# Patient Record
Sex: FEMALE | Race: WHITE | NOT HISPANIC OR LATINO | Employment: OTHER | URBAN - METROPOLITAN AREA
[De-identification: names, ages, dates, MRNs, and addresses within clinical notes are randomized per-mention and may not be internally consistent; named-entity substitution may affect disease eponyms.]

---

## 2017-01-19 ENCOUNTER — TELEPHONE (OUTPATIENT)
Dept: INTERNAL MEDICINE | Facility: CLINIC | Age: 52
End: 2017-01-19

## 2017-01-19 DIAGNOSIS — Z00.00 ROUTINE GENERAL MEDICAL EXAMINATION AT A HEALTH CARE FACILITY: Primary | ICD-10-CM

## 2017-01-19 NOTE — TELEPHONE ENCOUNTER
Hi, All she needs is the cholesterol and hep C check, fasting orders in.  Thank you, Manoj Stein

## 2017-01-19 NOTE — TELEPHONE ENCOUNTER
----- Message from Rupert Powers MA sent at 1/19/2017  8:29 AM CST -----  Contact: LAB APPT   An appointment for an annual physical has been scheduled for 4/4/17  The patient is requesting prior labs.    A lab appointment is scheduled for 3/27/17.  Please link labs to the scheduled appointment.    If the lab appointment is not appropriate, please cancel appointment and notify the patient.    Thank you!

## 2017-03-28 ENCOUNTER — LAB VISIT (OUTPATIENT)
Dept: LAB | Facility: HOSPITAL | Age: 52
End: 2017-03-28
Attending: INTERNAL MEDICINE
Payer: COMMERCIAL

## 2017-03-28 DIAGNOSIS — Z00.00 ROUTINE GENERAL MEDICAL EXAMINATION AT A HEALTH CARE FACILITY: ICD-10-CM

## 2017-03-28 LAB
CHOLEST/HDLC SERPL: 3.7 {RATIO}
HCV AB SERPL QL IA: NEGATIVE
HDL/CHOLESTEROL RATIO: 27.2 %
HDLC SERPL-MCNC: 239 MG/DL
HDLC SERPL-MCNC: 65 MG/DL
LDLC SERPL CALC-MCNC: 131.2 MG/DL
NONHDLC SERPL-MCNC: 174 MG/DL
TRIGL SERPL-MCNC: 214 MG/DL

## 2017-03-28 PROCEDURE — 36415 COLL VENOUS BLD VENIPUNCTURE: CPT

## 2017-03-28 PROCEDURE — 80061 LIPID PANEL: CPT

## 2017-03-28 PROCEDURE — 86803 HEPATITIS C AB TEST: CPT

## 2017-04-04 ENCOUNTER — OFFICE VISIT (OUTPATIENT)
Dept: INTERNAL MEDICINE | Facility: CLINIC | Age: 52
End: 2017-04-04
Payer: COMMERCIAL

## 2017-04-04 VITALS
SYSTOLIC BLOOD PRESSURE: 112 MMHG | HEART RATE: 73 BPM | DIASTOLIC BLOOD PRESSURE: 70 MMHG | BODY MASS INDEX: 23.82 KG/M2 | HEIGHT: 64 IN | WEIGHT: 139.56 LBS | OXYGEN SATURATION: 99 %

## 2017-04-04 DIAGNOSIS — Z00.00 ROUTINE GENERAL MEDICAL EXAMINATION AT A HEALTH CARE FACILITY: Primary | ICD-10-CM

## 2017-04-04 PROCEDURE — 99999 PR PBB SHADOW E&M-EST. PATIENT-LVL III: CPT | Mod: PBBFAC,,, | Performed by: INTERNAL MEDICINE

## 2017-04-04 PROCEDURE — 99396 PREV VISIT EST AGE 40-64: CPT | Mod: S$GLB,,, | Performed by: INTERNAL MEDICINE

## 2017-04-04 NOTE — MR AVS SNAPSHOT
"    [facility]  [address]  [place] [address]  Phone: [phone]  Fax: [phone]                  Rhoda Daly   2017 8:00 AM   Office Visit    Description:  Female : 1965   Provider:  Manoj Stein MD   Department:  [facility]           Reason for Visit     Hip Pain                To Do List           Goals (5 Years of Data)     None      Follow-Up and Disposition     Return in about 2 years (around 2019).      [facility]     [facility] On Call Nurse Care Line -  Assistance  Unless otherwise directed by your provider, please contact [facility], our nurse care line that is available for  assistance.     Registered nurses in the [facility] Center provide: appointment scheduling, clinical advisement, health education, and other advisory services.  Call: [phone] (toll free)               Medications           Message regarding Medications     Verify the changes and/or additions to your medication regime listed below are the same as discussed with your clinician today.  If any of these changes or additions are incorrect, please notify your healthcare provider.             Verify that the below list of medications is an accurate representation of the medications you are currently taking.  If none reported, the list may be blank. If incorrect, please contact your healthcare provider. Carry this list with you in case of emergency.           Current Medications     drospirenone-ethinyl estradiol (NELY) 3-0.02 mg per tablet Take 1 tablet by mouth once daily.           Clinical Reference Information           Your Vitals Were     BP Pulse Height Weight SpO2 BMI    112/70 (BP Location: Right arm, Patient Position: Sitting) 73 5' 4" (1.626 m) 63.3 kg (139 lb 8.8 oz) 99% 23.95 kg/m2      Blood Pressure          Most Recent Value    BP  112/70      Allergies as of 2017     No Known Allergies      Immunizations Administered " on Date of Encounter - 4/4/2017     None      Instructions      Iliotibial Band Stretch (Flexibility)    1. Stand next to a chair. Hold onto the chair with your right hand for support. Cross your right leg behind your left leg.  2. Lean your right hip toward the right. Feel the stretch at the outside of your hip.  3. Hold for 30 to 60 seconds. Then relax.  4. Repeat 2 times, or as instructed.  5. Switch sides and repeat.  6. Do this 3 times a day, or as instructed.      Tip: Dont bend forward or twist at the waist.   © 5425-7053 [employer]. [address] [place] [address], [place] [address]. All rights reserved. This information is not intended as a substitute for professional medical care. Always follow your healthcare professional's instructions.         Understanding Fat and Cholesterol  Too much cholesterol in your blood can lead to many problems such as blocked arteries. This can cause problems such as heart attack and stroke. One of the best ways to manage heart and blood vessel disease is to lower your blood cholesterol. Planning meals that are low in saturated fat and cholesterol helps reduce the level of cholesterol in your blood. Below are eating tips to help lower your blood cholesterol levels.  Eat Less Fat  A healthy goal is to have less than 25% of your daily calories come from fat. Instead of fats, eat more fruits, grains, and vegetables. This also helps control your weight, and can even reduce your risk for some cancers. There are different kinds of fats in foods. Fats can be saturated, unsaturated, or trans fats. The best fats to choose are unsaturated fats. But fats are high in calories, so eat even unsaturated fats sparingly.  Limit Foods High in Saturated Fats  Saturated fats come from animals and certain plants (such as coconut and palm). Eating too much saturated fat can raise your blood cholesterol levels and make your artery problems worse. Your goal is to eat less saturated fat. Below  are some examples of foods that contain lots of saturated fat:  · Fatty cuts of meat (lamb, ham, beef)  · Many pastries, cakes, cookies, and candies  · Cream, ice cream, sour cream, cheese, and butter, and foods made with them  · Sauces made with butter or cream  · Salad dressings with saturated fats  · Foods that contain palm or coconut oil  Choose Unsaturated Fats  Unsaturated fats are usually liquid at room temperature. They are better choices for your heart than saturated fat. There are two types of unsaturated fats: polyunsaturated fat and monounsaturated fat. Aim to replace saturated fats with polyunsaturated or monounsaturated fats.  · Polyunsaturated fats are found in corn oil, safflower oil, sunflower oil, and other vegetable oils.  · Monounsaturated fats are found in olive oil, canola oil, and peanut oil. Some margarines and spreads are now made with these oils, too. Avacados are also high in monounsaturated fat.  Of all fats, monounsaturated fats are the least harmful to your heart.  Avoid Trans Fats  Like saturated fats, trans fats have been linked to heart disease. Even a small amount can harm your health. Trans fats are found in liquid oils that have been changed to be solid at room temperature. Margarine, which is often made from vegetable oil, is one example. Vegetable shortening is another. Trans fats are often found in packaged goods. Check ingredients for the words hydrogenated or partially hydrogenated. They mean the foods contain trans fat.  Eat Less Cholesterol  Eating foods that contain cholesterol can also raise your blood cholesterol. Try to eat less than 200 mg of cholesterol a day. Food labels will tell you how much cholesterol is in the foods you eat.  Limit Foods High in Cholesterol  You cant see cholesterol. You have to read food labels to check the cholesterol in the foods you eat. Avoid or limit these high-cholesterol foods:  · Liver and other organ meats  · Fatty red  meats  · Bernardo and sausage  · Egg yolks (egg whites are okay)  · Shrimp  What About Triglycerides?  Triglycerides are a type of fat in your blood. Like cholesterol, high levels of triglycerides can lead to blocked arteries. Too much sugar and certain carbohydrates in your diet can raise triglyceride levels in your blood. Your doctor or nutritionist may advise you to avoid alcohol and to cut down on foods that are high in sugar and fat, especially if you have diabetes.    Reading Food Labels  Luckily, most foods now have labels giving you the facts about what youre eating. Reading food labels helps you make healthy choices. Look for the words highlighted below.  · Serving Size. This is the amount of food in 1 serving. If you eat larger portions, be sure to count more of everything: fat, calories, and cholesterol.  · Total Fat. Tells you how many grams (g) of fat are in 1 serving.  · Calories from Fat. This tells you the total number of calories from fat in 1 serving (there are 9 calories per gram of fat). Look for foods with the fewest calories from fat.  · Saturated Fat. Tells you how many grams (g) of saturated fat are in 1 serving.  · Trans Fat. Tells how many grams (g) of trans fat are in 1 serving.  · Cholesterol. Tells you how many milligrams (mg) of cholesterol are in 1 serving.  © 4813-6778 [employer], [address] [place] [address], [place] [address]. All rights reserved. This information is not intended as a substitute for professional medical care. Always follow your healthcare professional's instructions.        Understanding Food and Cholesterol  What you eat has a big effect on your bodys cholesterol level. Eating certain foods can raise your cholesterol. Other foods can help you lower it. Watching what you eat can help you get your cholesterol level under control.    Know High-Cholesterol Foods  Foods high in fat, cholesterol, or both:  · Fatty beef, cold cuts, bernardo, sausage  · Creamy sauces and fatty  gravies  · Cookies, donuts, muffins, and pastries  · Fried foods  · Egg yolks  · Shortening, butter, coconut oil, palm oil, hydrogenated oils (read labels)  · High-fat dairy products, such as whole milk, cheese, and ice cream  Better choices:  · Lean beef, skinless white-meat poultry, fish  · Tomato sauce, vegetable puree  · Dried fruit, bagels, bread with jam  · Baked, broiled, steamed, or roasted foods  · Egg whites or egg substitute  · Tub margarine, canola oil, and olive oil in moderation  · Low-fat or nonfat dairy products, such as 1% or fat-free milk, reduced-fat cheese, and nonfat frozen yogurt  Use Fiber to Help Control Cholesterol  Foods high in fiber can help you keep your cholesterol down. Good sources of fiber are:  · Oats, barley  · Whole grains  · Beans  · Vegetables  · Cornmeal, popcorn  · Berries, apples, other fruits  © 4017-0662 [facility], [address] [place] [address]. All rights reserved. This information is not intended as a substitute for professional medical care. Always follow your healthcare professional's instructions.                 Language Assistance Services     ATTENTION: Language assistance services are available, free of charge. Please call [phone].      ATENCIÓN: Si maira himanshucristina, tiene a velasquez disposición servicios gratuitos de asistencia lingüística. Llame al [phone].     CHÚ Ý: N?u b?n nói Ti?ng Vi?t, có các d?ch v? h? tr? ngôn ng? mi?n phí dành cho b?n. G?i s? [phone].         [facility] complies with applicable Federal civil rights laws and does not discriminate on the basis of race, color, national origin, age, disability, or sex.

## 2017-04-04 NOTE — PATIENT INSTRUCTIONS
Iliotibial Band Stretch (Flexibility)    1. Stand next to a chair. Hold onto the chair with your right hand for support. Cross your right leg behind your left leg.  2. Lean your right hip toward the right. Feel the stretch at the outside of your hip.  3. Hold for 30 to 60 seconds. Then relax.  4. Repeat 2 times, or as instructed.  5. Switch sides and repeat.  6. Do this 3 times a day, or as instructed.      Tip: Dont bend forward or twist at the waist.   © 2302-2329 [employer]. [address] [place] [address]. All rights reserved. This information is not intended as a substitute for professional medical care. Always follow your healthcare professional's instructions.         Understanding Fat and Cholesterol  Too much cholesterol in your blood can lead to many problems such as blocked arteries. This can cause problems such as heart attack and stroke. One of the best ways to manage heart and blood vessel disease is to lower your blood cholesterol. Planning meals that are low in saturated fat and cholesterol helps reduce the level of cholesterol in your blood. Below are eating tips to help lower your blood cholesterol levels.  Eat Less Fat  A healthy goal is to have less than 25% of your daily calories come from fat. Instead of fats, eat more fruits, grains, and vegetables. This also helps control your weight, and can even reduce your risk for some cancers. There are different kinds of fats in foods. Fats can be saturated, unsaturated, or trans fats. The best fats to choose are unsaturated fats. But fats are high in calories, so eat even unsaturated fats sparingly.  Limit Foods High in Saturated Fats  Saturated fats come from animals and certain plants (such as coconut and palm). Eating too much saturated fat can raise your blood cholesterol levels and make your artery problems worse. Your goal is to eat less saturated fat. Below are some examples of foods that contain lots of saturated  fat:  · Fatty cuts of meat (lamb, ham, beef)  · Many pastries, cakes, cookies, and candies  · Cream, ice cream, sour cream, cheese, and butter, and foods made with them  · Sauces made with butter or cream  · Salad dressings with saturated fats  · Foods that contain palm or coconut oil  Choose Unsaturated Fats  Unsaturated fats are usually liquid at room temperature. They are better choices for your heart than saturated fat. There are two types of unsaturated fats: polyunsaturated fat and monounsaturated fat. Aim to replace saturated fats with polyunsaturated or monounsaturated fats.  · Polyunsaturated fats are found in corn oil, safflower oil, sunflower oil, and other vegetable oils.  · Monounsaturated fats are found in olive oil, canola oil, and peanut oil. Some margarines and spreads are now made with these oils, too. Avacados are also high in monounsaturated fat.  Of all fats, monounsaturated fats are the least harmful to your heart.  Avoid Trans Fats  Like saturated fats, trans fats have been linked to heart disease. Even a small amount can harm your health. Trans fats are found in liquid oils that have been changed to be solid at room temperature. Margarine, which is often made from vegetable oil, is one example. Vegetable shortening is another. Trans fats are often found in packaged goods. Check ingredients for the words hydrogenated or partially hydrogenated. They mean the foods contain trans fat.  Eat Less Cholesterol  Eating foods that contain cholesterol can also raise your blood cholesterol. Try to eat less than 200 mg of cholesterol a day. Food labels will tell you how much cholesterol is in the foods you eat.  Limit Foods High in Cholesterol  You cant see cholesterol. You have to read food labels to check the cholesterol in the foods you eat. Avoid or limit these high-cholesterol foods:  · Liver and other organ meats  · Fatty red meats  · Angel and sausage  · Egg yolks (egg whites are  okay)  · Shrimp  What About Triglycerides?  Triglycerides are a type of fat in your blood. Like cholesterol, high levels of triglycerides can lead to blocked arteries. Too much sugar and certain carbohydrates in your diet can raise triglyceride levels in your blood. Your doctor or nutritionist may advise you to avoid alcohol and to cut down on foods that are high in sugar and fat, especially if you have diabetes.    Reading Food Labels  Luckily, most foods now have labels giving you the facts about what youre eating. Reading food labels helps you make healthy choices. Look for the words highlighted below.  · Serving Size. This is the amount of food in 1 serving. If you eat larger portions, be sure to count more of everything: fat, calories, and cholesterol.  · Total Fat. Tells you how many grams (g) of fat are in 1 serving.  · Calories from Fat. This tells you the total number of calories from fat in 1 serving (there are 9 calories per gram of fat). Look for foods with the fewest calories from fat.  · Saturated Fat. Tells you how many grams (g) of saturated fat are in 1 serving.  · Trans Fat. Tells how many grams (g) of trans fat are in 1 serving.  · Cholesterol. Tells you how many milligrams (mg) of cholesterol are in 1 serving.  © 3673-4037 [facility], [address] [place] [address], [place] [address]. All rights reserved. This information is not intended as a substitute for professional medical care. Always follow your healthcare professional's instructions.        Understanding Food and Cholesterol  What you eat has a big effect on your bodys cholesterol level. Eating certain foods can raise your cholesterol. Other foods can help you lower it. Watching what you eat can help you get your cholesterol level under control.    Know High-Cholesterol Foods  Foods high in fat, cholesterol, or both:  · Fatty beef, cold cuts, bernardo, sausage  · Creamy sauces and fatty gravies  · Cookies, donuts, muffins, and  pastries  · Fried foods  · Egg yolks  · Shortening, butter, coconut oil, palm oil, hydrogenated oils (read labels)  · High-fat dairy products, such as whole milk, cheese, and ice cream  Better choices:  · Lean beef, skinless white-meat poultry, fish  · Tomato sauce, vegetable puree  · Dried fruit, bagels, bread with jam  · Baked, broiled, steamed, or roasted foods  · Egg whites or egg substitute  · Tub margarine, canola oil, and olive oil in moderation  · Low-fat or nonfat dairy products, such as 1% or fat-free milk, reduced-fat cheese, and nonfat frozen yogurt  Use Fiber to Help Control Cholesterol  Foods high in fiber can help you keep your cholesterol down. Good sources of fiber are:  · Oats, barley  · Whole grains  · Beans  · Vegetables  · Cornmeal, popcorn  · Berries, apples, other fruits  © 8487-5455 [facility], [address] [place] [address], [place] [address]. All rights reserved. This information is not intended as a substitute for professional medical care. Always follow your healthcare professional's instructions.

## 2017-04-04 NOTE — PROGRESS NOTES
Subjective:       Patient ID: Rhoda Daly is a 51 y.o. female.    Chief Complaint: Hip Pain    HPI Comments: Hip pains for couple mos, has had in past post partum. Pain with sit to stand. Walking is fine. Stretching ITB area helps. No groin pains.    Hip Pain        Review of Systems   Constitutional: Negative for activity change, appetite change and unexpected weight change.   Eyes: Negative for visual disturbance.   Respiratory: Negative for cough, chest tightness and shortness of breath.    Cardiovascular: Negative for chest pain.   Gastrointestinal: Negative for abdominal distention and abdominal pain.   Genitourinary: Negative for difficulty urinating and urgency.        No breast lumps/masses/pain/nipple d/c in either breast     Skin: Negative for rash.       Objective:      Physical Exam   Constitutional: She is oriented to person, place, and time. She appears well-developed and well-nourished. No distress.   HENT:   Head: Normocephalic and atraumatic.   Eyes: Pupils are equal, round, and reactive to light. No scleral icterus.   Neck: Normal range of motion. No thyromegaly present.   Cardiovascular: Normal rate, regular rhythm and normal heart sounds.  Exam reveals no gallop and no friction rub.    No murmur heard.  Pulmonary/Chest: Effort normal and breath sounds normal. No respiratory distress. She has no wheezes. She has no rales.   Abdominal: Soft. Bowel sounds are normal. She exhibits no distension and no mass. There is no tenderness. There is no rebound and no guarding.   Musculoskeletal: Normal range of motion. She exhibits no edema or tenderness.   R greater troch not tender.  Tenderness to palp prox ITB at the ASIS insertion.  nml R hip rom. Neg SLR.   Lymphadenopathy:     She has no cervical adenopathy.   Neurological: She is alert and oriented to person, place, and time.   Skin: She is not diaphoretic.   Psychiatric: She has a normal mood and affect. Her speech is normal and behavior is normal.  Cognition and memory are normal.       Assessment:       No diagnosis found.    Plan:             Here for cpe.  Lengthy discussion re importance of decreasing fat in diet. We agreed on no statin based on low risk.  The 10-year ASCVD risk score ([place] GOMEZ Jr, et al., 2013) is: 1.2%    Values used to calculate the score:      Age: 51 years      Sex: Female      Is Non- : No      Diabetic: No      Tobacco smoker: No      Systolic Blood Pressure: 112 mmHg      Is BP treated: No      HDL Cholesterol: 65 mg/dL      Total Cholesterol: 239 mg/dL  Pt given handouts.  Inc exercise.  Discuss with gyn coming off BCP.    R outer hip pains -- likely ITB, Pt given handouts.  Explained that if not better in 1-2 mos, pt should rtc/call PCP then consider PT    Return in about 2 years (around 4/4/2019). email in 1 yr to recheck lipids though

## 2017-06-13 ENCOUNTER — TELEPHONE (OUTPATIENT)
Dept: OBSTETRICS AND GYNECOLOGY | Facility: CLINIC | Age: 52
End: 2017-06-13

## 2017-06-13 NOTE — TELEPHONE ENCOUNTER
Returned pt call regarding getting her daughter schedule for an appt.  Pt stated that her daughter is experiencing irregular cycles.  Informed pt of her daughter's appt date and time.  Pt verbalized understanding.  Letter sent out.

## 2017-06-13 NOTE — TELEPHONE ENCOUNTER
----- Message from Tootie Schwab sent at 6/13/2017  4:38 PM CDT -----  Contact: pt   _X  1st Request  _  2nd Request  _  3rd Request        Who: LAURA JIM [[id number]]    Why: pt is calling in regards to getting her daughter schedule as a new pt     What Number to Call Back: [phone]    When to Expect a call back: (Before the end of the day)   -- if the call is after 12:00, the call back will be tomorrow.

## 2017-06-22 ENCOUNTER — OFFICE VISIT (OUTPATIENT)
Dept: INTERNAL MEDICINE | Facility: CLINIC | Age: 52
End: 2017-06-22
Payer: COMMERCIAL

## 2017-06-22 ENCOUNTER — NURSE TRIAGE (OUTPATIENT)
Dept: ADMINISTRATIVE | Facility: CLINIC | Age: 52
End: 2017-06-22

## 2017-06-22 VITALS
WEIGHT: 138.25 LBS | SYSTOLIC BLOOD PRESSURE: 132 MMHG | BODY MASS INDEX: 23.6 KG/M2 | DIASTOLIC BLOOD PRESSURE: 80 MMHG | OXYGEN SATURATION: 97 % | HEART RATE: 68 BPM | HEIGHT: 64 IN | RESPIRATION RATE: 16 BRPM

## 2017-06-22 DIAGNOSIS — H81.11 BPPV (BENIGN PAROXYSMAL POSITIONAL VERTIGO), RIGHT: Primary | ICD-10-CM

## 2017-06-22 PROCEDURE — 99213 OFFICE O/P EST LOW 20 MIN: CPT | Mod: S$GLB,,, | Performed by: NURSE PRACTITIONER

## 2017-06-22 PROCEDURE — 99999 PR PBB SHADOW E&M-EST. PATIENT-LVL III: CPT | Mod: PBBFAC,,, | Performed by: NURSE PRACTITIONER

## 2017-06-22 RX ORDER — MECLIZINE HYDROCHLORIDE 25 MG/1
25 TABLET ORAL 3 TIMES DAILY PRN
Qty: 30 TABLET | Refills: 0 | Status: SHIPPED | OUTPATIENT
Start: 2017-06-22 | End: 2017-11-08

## 2017-06-22 RX ORDER — ONDANSETRON 4 MG/1
4-8 TABLET, ORALLY DISINTEGRATING ORAL EVERY 8 HOURS PRN
Qty: 30 TABLET | Refills: 0 | Status: SHIPPED | OUTPATIENT
Start: 2017-06-22 | End: 2017-11-08

## 2017-06-22 NOTE — TELEPHONE ENCOUNTER
Reason for Disposition   Patient wants to be seen    Protocols used: ST DIZZINESS-A-OH    Rhoda was calling to schedule an appointment due to dizziness off and on.  Transferred from scheduling.  Rhoda states began feeling dizzy on Saturday.  Was severe on Saturday so she stayed in bed.  Since has gotten better but still feeling dizzy and nauseous off and on.  No other symptoms to report.  Appointment scheduled.  Please contact caller directly with any further care advice.

## 2017-06-22 NOTE — PATIENT INSTRUCTIONS
Benign Paroxysmal Positional Vertigo  Benign paroxysmal positional vertigo (BPPV) is a problem with the inner ear. The inner ear contains the vestibular system. This system is what helps you keep your balance. BPPV causes a feeling of spinning. It is a common problem of the vestibular system.  Understanding the vestibular system  The vestibular system of the ear is made up of very tiny parts. They include the utricle, saccule, and semicircular canals. The utricle is a tiny organ that contains calcium crystals. In some people, the crystals can move into the semicircular canals. When this happens, the system no longer works as it should. This causes BPPV. Benign means it is not life-threatening. Paroxysmal means it happens suddenly. Positional means that it happens when you move your head. Vertigo is a feeling of spinning.  What causes BPPV?  Causes include injury to your head or neck. Other problems with the vestibular system may cause BPPV. In many people, the cause of BPPV is not known.  Symptoms of BPPV  You many have repeated feelings of spinning (vertigo). The vertigo usually lasts less than 1 minute. Some movements, suchas rolling over in bed, can bring on vertigo.  Diagnosing BPPV  Your primary health care provider may diagnose and treat your BPPV. Or you may see an ear, nose, and throat doctor (otolaryngologist). In some cases, you may see a nervous system doctor (neurologist).  The health care provider will ask about your symptoms and your medical history. He or she will examine you. You may have hearing and balance tests. As part of the exam, your health care provider may have you move your head and body in certain ways. If you have BPPV, the movements can bring on vertigo. Your provider will also look for abnormal movements of your eyes. You may have other tests to check your vestibular or nervous systems.  Treatment for BPPV  Your health care provider may try to move the calcium crystals. This is done  by having you move your head and neck in certain ways. This treatment is safe and often works well. You may also be told to do these movements at home. You may still have vertigo for a few weeks. Your health care provider will recheck your symptoms, usually in about a month. Special physical therapy may also be part of treatment.  In rare cases surgery may be needed for BPPV that does not go away.     When to call the health care provider  Call your health care provider right away if you have any of these:  · Symptoms that do not go away with treatment  · Symptoms that get worse  · New symptoms   Date Last Reviewed: 3/19/2015  © 5592-9267 [employer]. [address] [place] [address], [place] [address]. All rights reserved. This information is not intended as a substitute for professional medical care. Always follow your healthcare professional's instructions.

## 2017-06-23 NOTE — PROGRESS NOTES
Subjective:       Patient ID: Rhoda Daly is a 52 y.o. female.    Chief Complaint: Dizziness and Nausea    Ms. Daly awoke on Saturday and when she got up she found she was very dizzy.  She took a shower, then felt so bad she lay down.  She spent most of the day in bed feeling nauseated and dizzy.  On Sunday, she felt better.  She went to work Monday and the dizziness returned, though it would come and go, each spell of dizziness lasting much less time than the initial event on Saturday. She says that certain actions, such as watching the computer screen when she scrolls, or rolling over in bed trigger the dizziness.       Dizziness:   Chronicity:  New  Onset:  In the past 7 days  Progression since onset:  Resolved, then recurrent  Severity:  Initially severe, but improved  Duration:  Off/on all day  Dizziness characteristics:  Sensation of movement  Initial Spell Date and Length:  7 hours  Frequency of Spells:  Daily   Associated symptoms: nausea.no fever and no chest pain.  Aggravated by:  Position changes  Treatments tried:  Nothing  Nausea   Associated symptoms include nausea. Pertinent negatives include no chest pain, fatigue, fever or rash.     Review of Systems   Constitutional: Negative for fatigue and fever.   HENT: Negative for facial swelling.    Eyes: Positive for visual disturbance.   Respiratory: Negative for shortness of breath.    Cardiovascular: Negative for chest pain.   Gastrointestinal: Positive for nausea.   Genitourinary: Negative for dysuria.   Musculoskeletal: Negative for gait problem.   Skin: Negative for rash.   Neurological: Positive for dizziness.   Psychiatric/Behavioral: Negative for decreased concentration.       Objective:      Physical Exam   Constitutional: She is oriented to person, place, and time. She appears well-developed and well-nourished. No distress.   Eyes: No scleral icterus.   Nystagmus beat to right with EOM   Neck: Normal range of motion. Neck supple.    Cardiovascular: Normal rate, regular rhythm and normal heart sounds.    Pulmonary/Chest: Effort normal and breath sounds normal. No respiratory distress. She has no wheezes. She has no rales.   Musculoskeletal: Normal range of motion.   Lymphadenopathy:     She has no cervical adenopathy.   Neurological: She is alert and oriented to person, place, and time.   Skin: Skin is warm and dry. She is not diaphoretic.   Psychiatric: Her behavior is normal.   Nursing note and vitals reviewed.      Assessment:       1. BPPV (benign paroxysmal positional vertigo), right        Plan:   1. BPPV (benign paroxysmal positional vertigo), right  - meclizine (ANTIVERT) 25 mg tablet; Take 1 tablet (25 mg total) by mouth 3 (three) times daily as needed.  Dispense: 30 tablet; Refill: 0  - ondansetron (ZOFRAN-ODT) 4 MG TbDL; Take 1-2 tablets (4-8 mg total) by mouth every 8 (eight) hours as needed.  Dispense: 30 tablet; Refill: 0      Pt has been given instructions populated from [employer] database and has verbalized understanding of the after visit summary and information contained wherein.    Follow up with a primary care provider. May go to ER for acute shortness of breath, lightheadedness, fever, or any other emergent complaints or changes in condition.

## 2017-06-28 ENCOUNTER — PATIENT MESSAGE (OUTPATIENT)
Dept: INTERNAL MEDICINE | Facility: CLINIC | Age: 52
End: 2017-06-28

## 2017-07-06 ENCOUNTER — HOSPITAL ENCOUNTER (OUTPATIENT)
Dept: RADIOLOGY | Facility: HOSPITAL | Age: 52
Discharge: HOME | End: 2017-07-06
Attending: OBSTETRICS & GYNECOLOGY
Payer: COMMERCIAL

## 2017-07-06 DIAGNOSIS — Z12.31 ENCOUNTER FOR SCREENING MAMMOGRAM FOR BREAST CANCER: ICD-10-CM

## 2017-07-06 PROCEDURE — 77067 SCR MAMMO BI INCL CAD: CPT | Mod: TC

## 2017-07-06 PROCEDURE — 77063 BREAST TOMOSYNTHESIS BI: CPT | Mod: 26,,, | Performed by: RADIOLOGY

## 2017-07-06 PROCEDURE — 77067 SCR MAMMO BI INCL CAD: CPT | Mod: 26,,, | Performed by: RADIOLOGY

## 2017-07-26 ENCOUNTER — OFFICE VISIT (OUTPATIENT)
Dept: DERMATOLOGY | Facility: CLINIC | Age: 52
End: 2017-07-26
Payer: COMMERCIAL

## 2017-07-26 DIAGNOSIS — D22.9 MULTIPLE BENIGN NEVI: Primary | ICD-10-CM

## 2017-07-26 DIAGNOSIS — D18.00 ANGIOMA: ICD-10-CM

## 2017-07-26 DIAGNOSIS — L82.1 SEBORRHEIC KERATOSES: ICD-10-CM

## 2017-07-26 PROCEDURE — 99999 PR PBB SHADOW E&M-EST. PATIENT-LVL II: CPT | Mod: PBBFAC,,, | Performed by: DERMATOLOGY

## 2017-07-26 PROCEDURE — 99203 OFFICE O/P NEW LOW 30 MIN: CPT | Mod: S$GLB,,, | Performed by: DERMATOLOGY

## 2017-07-26 NOTE — PROGRESS NOTES
Subjective:       Patient ID:  Rhoda Daly is a 52 y.o. female who presents for   Chief Complaint   Patient presents with    Skin Check     TBSE     HPI  51 yo F presents for skin check. She is noticing brown spots on her legs, increasing in number over the years, asymptomatic, no prior treatments    Denies personal h/o skin cancer; Dad with h/o melanoma; Mom and sister with h/o NMSC    Past Medical History:   Diagnosis Date    Diverticulitis      Review of Systems   Constitutional: Negative for fever, chills, weight loss, weight gain, fatigue and malaise.   Musculoskeletal: Negative for myalgias, joint swelling and arthralgias.   Skin: Positive for activity-related sunscreen use and recent sunburn. Negative for daily sunscreen use.   Hematologic/Lymphatic: Does not bruise/bleed easily.        Objective:    Physical Exam   Constitutional: She appears well-developed and well-nourished. No distress.   Neurological: She is alert and oriented to person, place, and time. She is not disoriented.   Psychiatric: She has a normal mood and affect.   Skin:   Areas Examined (abnormalities noted in diagram):   Scalp / Hair Palpated and Inspected  Head / Face Inspection Performed  Neck Inspection Performed  Chest / Axilla Inspection Performed  Abdomen Inspection Performed  Genitals / Buttocks / Groin Inspection Performed  Back Inspection Performed  RUE Inspected  LUE Inspection Performed  RLE Inspected  LLE Inspection Performed  Nails and Digits Inspection Performed                       Diagram Legend     Erythematous scaling macule/papule c/w actinic keratosis       Vascular papule c/w angioma      Pigmented verrucoid papule/plaque c/w seborrheic keratosis      Yellow umbilicated papule c/w sebaceous hyperplasia      Irregularly shaped tan macule c/w lentigo     1-2 mm smooth white papules consistent with Milia      Movable subcutaneous cyst with punctum c/w epidermal inclusion cyst      Subcutaneous movable cyst c/w  pilar cyst      Firm pink to brown papule c/w dermatofibroma      Pedunculated fleshy papule(s) c/w skin tag(s)      Evenly pigmented macule c/w junctional nevus     Mildly variegated pigmented, slightly irregular-bordered macule c/w mildly atypical nevus      Flesh colored to evenly pigmented papule c/w intradermal nevus       Pink pearly papule/plaque c/w basal cell carcinoma      Erythematous hyperkeratotic cursted plaque c/w SCC      Surgical scar with no sign of skin cancer recurrence      Open and closed comedones      Inflammatory papules and pustules      Verrucoid papule consistent consistent with wart     Erythematous eczematous patches and plaques     Dystrophic onycholytic nail with subungual debris c/w onychomycosis     Umbilicated papule    Erythematous-base heme-crusted tan verrucoid plaque consistent with inflamed seborrheic keratosis     Erythematous Silvery Scaling Plaque c/w Psoriasis     See annotation      Assessment / Plan:        Multiple benign nevi  Reassurance that her nevi appear benign with regular and consistent pigment pattern on dermoscopy    Angioma  This is a benign vascular lesion. Reassurance given. No treatment required.     Seborrheic keratoses  These are benign inherited growths without a malignant potential. Reassurance given to patient. No treatment is necessary.            Return in about 1 year (around 7/26/2018).

## 2017-11-08 ENCOUNTER — OFFICE VISIT (OUTPATIENT)
Dept: OBSTETRICS AND GYNECOLOGY | Facility: CLINIC | Age: 52
End: 2017-11-08
Attending: OBSTETRICS & GYNECOLOGY
Payer: COMMERCIAL

## 2017-11-08 VITALS
HEIGHT: 64 IN | WEIGHT: 138.88 LBS | BODY MASS INDEX: 23.71 KG/M2 | DIASTOLIC BLOOD PRESSURE: 80 MMHG | SYSTOLIC BLOOD PRESSURE: 120 MMHG

## 2017-11-08 DIAGNOSIS — Z30.41 ENCOUNTER FOR SURVEILLANCE OF CONTRACEPTIVE PILLS: ICD-10-CM

## 2017-11-08 DIAGNOSIS — N63.20 LEFT BREAST MASS: ICD-10-CM

## 2017-11-08 DIAGNOSIS — Z01.419 VISIT FOR GYNECOLOGIC EXAMINATION: Primary | ICD-10-CM

## 2017-11-08 DIAGNOSIS — N95.1 PERIMENOPAUSE: ICD-10-CM

## 2017-11-08 PROCEDURE — 99999 PR PBB SHADOW E&M-EST. PATIENT-LVL II: CPT | Mod: PBBFAC,,, | Performed by: OBSTETRICS & GYNECOLOGY

## 2017-11-08 PROCEDURE — 99396 PREV VISIT EST AGE 40-64: CPT | Mod: S$GLB,,, | Performed by: OBSTETRICS & GYNECOLOGY

## 2017-11-08 RX ORDER — DROSPIRENONE AND ETHINYL ESTRADIOL 0.02-3(28)
1 KIT ORAL DAILY
Qty: 30 TABLET | Refills: 3 | Status: SHIPPED | OUTPATIENT
Start: 2017-11-08 | End: 2018-03-01 | Stop reason: SDUPTHER

## 2017-11-08 NOTE — PROGRESS NOTES
"CC: Well woman exam    Rhoda Daly is a 52 y.o. female  presents for a well woman exam.  LMP: Patient's last menstrual period was 10/14/2017..  No issues, problems, or complaints.    Past Medical History:   Diagnosis Date    Diverticulitis      Past Surgical History:   Procedure Laterality Date     SECTION       Social History     Social History    Marital status:      Spouse name: N/A    Number of children: N/A    Years of education: N/A     Occupational History     [employer].      Social History Main Topics    Smoking status: Never Smoker    Smokeless tobacco: Never Used    Alcohol use Yes      Comment: 2 at most one occ, 1 beer each noc    Drug use: No    Sexual activity: Yes     Partners: Male     Birth control/ protection: OCP     Other Topics Concern    None     Social History Narrative    , 2 kids, 16 and 20 and dog.    Close to parents and  mother.    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     Family History   Problem Relation Age of Onset    Hypertension Mother     Peripheral vascular disease Mother     Hyperlipidemia Father     Heart disease Father      cad    Melanoma Father     Melanoma Sister     Breast cancer Neg Hx     Colon cancer Neg Hx     Ovarian cancer Neg Hx     Cancer Neg Hx      OB History      Para Term  AB Living    4 4 2     2    SAB TAB Ectopic Multiple Live Births            2          /80   Ht 5' 4" (1.626 m)   Wt 63 kg (138 lb 14.2 oz)   LMP 10/14/2017   BMI 23.84 kg/m²       ROS:    ROS:  GENERAL: Denies weight gain or weight loss. Feeling well overall.   SKIN: Denies rash or lesions.   HEAD: Denies head injury or headache.   NODES: Denies enlarged lymph nodes.   CHEST: Denies chest pain or shortness of breath.   CARDIOVASCULAR: Denies palpitations or left sided chest pain.   ABDOMEN: No abdominal pain, constipation, diarrhea, nausea, vomiting or rectal bleeding.   URINARY: No " frequency, dysuria, hematuria, or burning on urination.  REPRODUCTIVE: See HPI.   BREASTS: The patient performs breast self-examination and denies pain, lumps, or nipple discharge.   HEMATOLOGIC: No easy bruisability or excessive bleeding.   MUSCULOSKELETAL: Denies joint pain or swelling.   NEUROLOGIC: Denies syncope or weakness.   PSYCHIATRIC: Denies depression, anxiety or mood swings.    PHYSICAL EXAM:    APPEARANCE: Well nourished, well developed, in no acute distress.  AFFECT: WNL, alert and oriented x 3  SKIN: No acne or hirsutism  NECK: Neck symmetric without masses or thyromegaly  NODES: No inguinal, cervical, axillary, or femoral lymph node enlargement  CHEST: Good respiratory effect  ABDOMEN: Soft.  No tenderness or masses.  No hepatosplenomegaly.  No hernias.  BREASTS: Symmetrical, no skin changes or visible lesions.  Small tender left palpable breast mass, no nipple discharge bilaterally.  PELVIC: Normal external genitalia without lesions.  Normal hair distribution.  Adequate perineal body, normal urethral meatus.  Vagina moist and well rugated without lesions or discharge.  Cervix pink, without lesions, discharge or tenderness.  No significant cystocele or rectocele.  Bimanual exam shows uterus to be normal size, regular, mobile and nontender.  Adnexa without masses or tenderness.    RECTAL: Rectovaginal exam confirms above with normal sphincter tone, no masses.  EXTREMITIES: No edema.      ICD-10-CM ICD-9-CM    1. Visit for gynecologic examination Z01.419 V72.31    2. Encounter for surveillance of contraceptive pills Z30.41 V25.41 drospirenone-ethinyl estradiol (NELY) 3-0.02 mg per tablet   3. Perimenopause N95.1 627.2 Follicle stimulating hormone   4. Left breast mass N63.20 611.72 Mammo Digital Diagnostic Left with Lance         Patient was counseled today on A.C.S. Pap guidelines and recommendations for yearly pelvic exams, mammograms and monthly self breast exams; to see her PCP for other health  "maintenance.     Discussed the risks/benefits of continuing OCP.  Discussed that based on her age, she is most likely in menopause.  However, unable to determine as she may or may not be having a "false period".  She will discuss menopausal age with her mother and older sister.  In the meantime, will continue OCP and draw an FSH.  Discussed that if it is elevated, she is most likely in menopause.  However, if it is normal or low, it is unreliable due to birth control pill.    Return in about 1 year (around 11/8/2018).                  "

## 2017-11-09 DIAGNOSIS — Z30.41 ENCOUNTER FOR SURVEILLANCE OF CONTRACEPTIVE PILLS: ICD-10-CM

## 2017-11-09 RX ORDER — DROSPIRENONE AND ETHINYL ESTRADIOL TABLETS 0.02-3(28)
1 KIT ORAL DAILY
Qty: 28 TABLET | Refills: 0 | OUTPATIENT
Start: 2017-11-09

## 2017-11-15 ENCOUNTER — HOSPITAL ENCOUNTER (OUTPATIENT)
Dept: RADIOLOGY | Facility: HOSPITAL | Age: 52
Discharge: HOME | End: 2017-11-15
Attending: OBSTETRICS & GYNECOLOGY
Payer: COMMERCIAL

## 2017-11-15 DIAGNOSIS — N63.20 LEFT BREAST MASS: ICD-10-CM

## 2017-11-15 PROCEDURE — 76642 ULTRASOUND BREAST LIMITED: CPT | Mod: TC,LT

## 2017-11-15 PROCEDURE — 77061 BREAST TOMOSYNTHESIS UNI: CPT | Mod: 26,LT,, | Performed by: RADIOLOGY

## 2017-11-15 PROCEDURE — 77061 BREAST TOMOSYNTHESIS UNI: CPT | Mod: TC,LT

## 2017-11-15 PROCEDURE — 76642 ULTRASOUND BREAST LIMITED: CPT | Mod: 26,LT,, | Performed by: RADIOLOGY

## 2017-11-15 PROCEDURE — 77065 DX MAMMO INCL CAD UNI: CPT | Mod: 26,LT,, | Performed by: RADIOLOGY

## 2018-01-17 ENCOUNTER — PATIENT MESSAGE (OUTPATIENT)
Dept: INTERNAL MEDICINE | Facility: CLINIC | Age: 53
End: 2018-01-17

## 2018-02-01 ENCOUNTER — HOSPITAL ENCOUNTER (OUTPATIENT)
Dept: RADIOLOGY | Facility: HOSPITAL | Age: 53
Discharge: HOME | End: 2018-02-01
Attending: PHYSICIAN ASSISTANT
Payer: COMMERCIAL

## 2018-02-01 ENCOUNTER — OFFICE VISIT (OUTPATIENT)
Dept: SPORTS MEDICINE | Facility: CLINIC | Age: 53
End: 2018-02-01
Payer: COMMERCIAL

## 2018-02-01 VITALS
HEART RATE: 83 BPM | DIASTOLIC BLOOD PRESSURE: 97 MMHG | BODY MASS INDEX: 23.56 KG/M2 | HEIGHT: 64 IN | WEIGHT: 138 LBS | SYSTOLIC BLOOD PRESSURE: 168 MMHG

## 2018-02-01 DIAGNOSIS — M25.562 PAIN IN BOTH KNEES, UNSPECIFIED CHRONICITY: ICD-10-CM

## 2018-02-01 DIAGNOSIS — M25.561 PAIN IN BOTH KNEES, UNSPECIFIED CHRONICITY: ICD-10-CM

## 2018-02-01 DIAGNOSIS — M23.41 LOOSE BODY IN KNEE, RIGHT KNEE: ICD-10-CM

## 2018-02-01 DIAGNOSIS — M25.561 ACUTE PAIN OF RIGHT KNEE: Primary | ICD-10-CM

## 2018-02-01 PROCEDURE — 73564 X-RAY EXAM KNEE 4 OR MORE: CPT | Mod: 26,RT,, | Performed by: RADIOLOGY

## 2018-02-01 PROCEDURE — 73564 X-RAY EXAM KNEE 4 OR MORE: CPT | Mod: 26,LT,, | Performed by: RADIOLOGY

## 2018-02-01 PROCEDURE — 99203 OFFICE O/P NEW LOW 30 MIN: CPT | Mod: SA,S$GLB,, | Performed by: PHYSICIAN ASSISTANT

## 2018-02-01 PROCEDURE — 99999 PR PBB SHADOW E&M-EST. PATIENT-LVL III: CPT | Mod: PBBFAC,,, | Performed by: PHYSICIAN ASSISTANT

## 2018-02-01 PROCEDURE — 3008F BODY MASS INDEX DOCD: CPT | Mod: S$GLB,,, | Performed by: PHYSICIAN ASSISTANT

## 2018-02-01 PROCEDURE — 73564 X-RAY EXAM KNEE 4 OR MORE: CPT | Mod: TC,50,FY,PO

## 2018-02-01 NOTE — LETTER
February 2, 2018      Gissell Cheema MD  [address]  [place] [address]           [facility]  [address]  [facility] [address]  Phone: [phone]          Patient: Rhoda Daly   MR Number: [id number]   YOB: 1965   Date of Visit: 2/1/2018       Dear Dr. Gissell Cheema:    Thank you for referring Rhoda Daly to me for evaluation. Attached you will find relevant portions of my assessment and plan of care.    If you have questions, please do not hesitate to call me. I look forward to following Rhoda Daly along with you.    Sincerely,    Yoseph Daugherty III, PA-C    Enclosure  CC:  No Recipients    If you would like to receive this communication electronically, please contact [email or web address] or [phone] to request more information on [employer] Link access.    For providers and/or their staff who would like to refer a patient to [facility], please contact us through our one-stop-shop provider referral line, Kathy Schmidt, at [phone].    If you feel you have received this communication in error or would no longer like to receive these types of communications, please e-mail [email or web address]

## 2018-02-02 ENCOUNTER — HOSPITAL ENCOUNTER (EMERGENCY)
Facility: HOSPITAL | Age: 53
Discharge: HOME | End: 2018-02-02
Attending: EMERGENCY MEDICINE
Payer: COMMERCIAL

## 2018-02-02 VITALS
SYSTOLIC BLOOD PRESSURE: 186 MMHG | WEIGHT: 135 LBS | DIASTOLIC BLOOD PRESSURE: 86 MMHG | OXYGEN SATURATION: 100 % | BODY MASS INDEX: 23.05 KG/M2 | HEART RATE: 74 BPM | RESPIRATION RATE: 18 BRPM | HEIGHT: 64 IN | TEMPERATURE: 98 F

## 2018-02-02 DIAGNOSIS — I10 HYPERTENSION: Primary | ICD-10-CM

## 2018-02-02 LAB
ALBUMIN SERPL BCP-MCNC: 3.6 G/DL
ALP SERPL-CCNC: 74 U/L
ALT SERPL W/O P-5'-P-CCNC: 16 U/L
ANION GAP SERPL CALC-SCNC: 10 MMOL/L
AST SERPL-CCNC: 22 U/L
BASOPHILS # BLD AUTO: 0.03 K/UL
BASOPHILS NFR BLD: 0.5 %
BILIRUB SERPL-MCNC: 0.5 MG/DL
BNP SERPL-MCNC: 31 PG/ML
BUN SERPL-MCNC: 12 MG/DL
CALCIUM SERPL-MCNC: 9.7 MG/DL
CHLORIDE SERPL-SCNC: 105 MMOL/L
CO2 SERPL-SCNC: 25 MMOL/L
CREAT SERPL-MCNC: 0.9 MG/DL
DIFFERENTIAL METHOD: NORMAL
EOSINOPHIL # BLD AUTO: 0.1 K/UL
EOSINOPHIL NFR BLD: 1.1 %
ERYTHROCYTE [DISTWIDTH] IN BLOOD BY AUTOMATED COUNT: 12.1 %
EST. GFR  (AFRICAN AMERICAN): >60 ML/MIN/1.73 M^2
EST. GFR  (NON AFRICAN AMERICAN): >60 ML/MIN/1.73 M^2
GLUCOSE SERPL-MCNC: 89 MG/DL
HCT VFR BLD AUTO: 41.8 %
HGB BLD-MCNC: 14.3 G/DL
IMM GRANULOCYTES # BLD AUTO: 0.02 K/UL
IMM GRANULOCYTES NFR BLD AUTO: 0.3 %
LYMPHOCYTES # BLD AUTO: 1.5 K/UL
LYMPHOCYTES NFR BLD: 24.1 %
MCH RBC QN AUTO: 30.8 PG
MCHC RBC AUTO-ENTMCNC: 34.2 G/DL
MCV RBC AUTO: 90 FL
MONOCYTES # BLD AUTO: 0.6 K/UL
MONOCYTES NFR BLD: 9.7 %
NEUTROPHILS # BLD AUTO: 4.1 K/UL
NEUTROPHILS NFR BLD: 64.3 %
NRBC BLD-RTO: 0 /100 WBC
PLATELET # BLD AUTO: 325 K/UL
PMV BLD AUTO: 10 FL
POTASSIUM SERPL-SCNC: 3.8 MMOL/L
PROT SERPL-MCNC: 7.7 G/DL
RBC # BLD AUTO: 4.65 M/UL
SODIUM SERPL-SCNC: 140 MMOL/L
TROPONIN I SERPL DL<=0.01 NG/ML-MCNC: <0.006 NG/ML
WBC # BLD AUTO: 6.4 K/UL

## 2018-02-02 PROCEDURE — 93005 ELECTROCARDIOGRAM TRACING: CPT

## 2018-02-02 PROCEDURE — 83880 ASSAY OF NATRIURETIC PEPTIDE: CPT

## 2018-02-02 PROCEDURE — 84484 ASSAY OF TROPONIN QUANT: CPT

## 2018-02-02 PROCEDURE — 93010 ELECTROCARDIOGRAM REPORT: CPT | Mod: ,,, | Performed by: INTERNAL MEDICINE

## 2018-02-02 PROCEDURE — 85025 COMPLETE CBC W/AUTO DIFF WBC: CPT

## 2018-02-02 PROCEDURE — 99284 EMERGENCY DEPT VISIT MOD MDM: CPT | Mod: ,,, | Performed by: EMERGENCY MEDICINE

## 2018-02-02 PROCEDURE — 99284 EMERGENCY DEPT VISIT MOD MDM: CPT | Mod: 25

## 2018-02-02 PROCEDURE — 80053 COMPREHEN METABOLIC PANEL: CPT

## 2018-02-02 NOTE — PROGRESS NOTES
CC: Right knee pain    52 y.o. Female  works for [employer], with a history of Right knee pain. She states that the pain is severe and not responding to any conservative care.  Pain began 4 days ago with no inciting injury or trauma. Her pain is located of varying positions but mostly in the posterior, anterior, and medial knee. She describes her pain as aching. Pain is usually worse with some type of activity. Since her pain began she has been noticing intermittent knee effusions. She has tried ibuprofen and ice with no significant pain improvement.     SANE score of 70%.     No mechanical symptoms, no instability    Is affecting ADLs.      Review of Systems   Constitution: Negative. Negative for chills, fever and night sweats.   HENT: Negative for congestion and headaches.    Eyes: Negative for blurred vision, left vision loss and right vision loss.   Cardiovascular: Negative for chest pain and syncope.   Respiratory: Negative for cough and shortness of breath.    Endocrine: Negative for polydipsia, polyphagia and polyuria.   Hematologic/Lymphatic: Negative for bleeding problem. Does not bruise/bleed easily.   Skin: Negative for dry skin, itching and rash.   Musculoskeletal: Negative for falls. Positive for knee pain and muscle weakness.   Gastrointestinal: Negative for abdominal pain and bowel incontinence.   Genitourinary: Negative for bladder incontinence and nocturia.   Neurological: Negative for disturbances in coordination, loss of balance and seizures.   Psychiatric/Behavioral: Negative for depression. The patient does not have insomnia.    Allergic/Immunologic: Negative for hives and persistent infections.   All other systems negative.    PAST MEDICAL HISTORY:   Past Medical History:   Diagnosis Date    Diverticulitis      PAST SURGICAL HISTORY:   Past Surgical History:   Procedure Laterality Date     SECTION       FAMILY HISTORY:   Family History   Problem Relation Age of Onset  "   Hypertension Mother     Peripheral vascular disease Mother     Hyperlipidemia Father     Heart disease Father      cad    Melanoma Father     Melanoma Sister     Breast cancer Neg Hx     Colon cancer Neg Hx     Ovarian cancer Neg Hx     Cancer Neg Hx      SOCIAL HISTORY:   Social History     Social History    Marital status:      Spouse name: N/A    Number of children: N/A    Years of education: N/A     Occupational History     [employer].      Social History Main Topics    Smoking status: Never Smoker    Smokeless tobacco: Never Used    Alcohol use Yes      Comment: 2 at most one occ, 1 beer each noc    Drug use: No    Sexual activity: Yes     Partners: Male     Birth control/ protection: OCP     Other Topics Concern    Not on file     Social History Narrative    , 2 kids, 16 and 20 and dog.    Close to parents and  mother.    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       MEDICATIONS:   Current Outpatient Prescriptions:     drospirenone-ethinyl estradiol (NELY) 3-0.02 mg per tablet, Take 1 tablet by mouth once daily., Disp: 30 tablet, Rfl: 3  ALLERGIES: Review of patient's allergies indicates:  No Known Allergies    VITAL SIGNS: BP (!) 168/97   Pulse 83   Ht 5' 4" (1.626 m)   Wt 62.6 kg (138 lb)   BMI 23.69 kg/m²      PHYSICAL EXAMINATION  VITAL SIGNS: BP (!) 168/97   Pulse 83   Ht 5' 4" (1.626 m)   Wt 62.6 kg (138 lb)   BMI 23.69 kg/m²    General:  The patient is alert and oriented x 3.  Mood is pleasant.  Observation of ears, eyes and nose reveal no gross abnormalities.  HEENT: NCAT, sclera nonicteric  Lungs: Respirations are equal and unlabored.    Right KNEE EXAMINATION     OBSERVATION / INSPECTION   Gait:   Nonantalgic   Alignment:  Neutral   Scars:   None   Muscle atrophy: Mild  Effusion:  Mild  Warmth:  None   Discoloration:   none     TENDERNESS / CREPITUS (T / C):          T / C      T / C   Patella   - / -   Lateral joint line   - / " -    Peripatellar medial  +  Medial joint line    - / -    Peripatellar lateral -  Medial plica   - / -    Patellar tendon -   Popliteal fossa  + / -    Quad tendon   -   Gastrocnemius   -   Prepatellar Bursa - / -   Quadricep   -   Tibial tubercle  -  Thigh/hamstring  -   Pes anserine/HS -  Fibula    -   ITB   - / -  Tibia     -   Tib/fib joint  - / -  LCL    -     MFC   - / -   MCL: Proximal  -    LFC   - / -    Distal   -          ROM: (* = pain)  PASSIVE   ACTIVE    Left :   5 / 0 / 145   5 / 0 / 145     Right :    0 / 0 / 135   2 / 0 / 140    Patellofemoral examination:  See above noted areas of tenderness.   Patella position    Subluxation / dislocation: Centered           Sup. / Inf;   Normal   Crepitus (PF):    Absent   Patellar Mobility:       Medial-lateral:   Normal    Superior-inferior:  Normal    Inferior tilt   Normal    Patellar tendon:  Normal   Lateral tilt:    Normal   J-sign:     None   Patellofemoral grind:   No pain       MENISCAL SIGNS:     Pain on terminal extension:  +  Pain on terminal flexion:  -  Kerris maneuver:  - for pain  Squat     + posterior joint pain    LIGAMENT EXAMINATION:  ACL / Lachman:  normal (-1 to 2mm)    PCL-Post.  drawer: normal 0 to 2mm  MCL- Valgus:  normal 0 to 2mm  LCL- Varus:  normal 0 to 2mm  Pivot shift: normal (Equal)   Dial Test: difference c/w other side   At 30° flexion: normal (< 5°)    At 90° flexion: normal (< 5°)   Reverse Pivot Shift:   normal (Equal)     STRENGTH: (* = with pain) PAINFUL SIDE   Quadricep   4+/5   Hamstrin/5    EXTREMITY NEURO-VASCULAR EXAMINATION:   Sensation:  Grossly intact to light touch all dermatomal regions.   Motor Function:  Fully intact motor function at hip, knee, foot and ankle    DTRs;  quadriceps and  achilles 2+.  No clonus and downgoing Babinski.    Vascular status:  DP and PT pulses 2+, brisk capillary refill, symmetric.     Other Findings:       X-rays:  including standing, weight bearing AP and flexion  bilateral knees, lateral and merchant views ordered and images reviewed by me show:  No fracture, dislocation. Ossific foci noted of the posterior knee concerning for loose bodies.      ASSESSMENT:    Right Knee pain, acute, probable loose bodies originating for cartilage in knee joint      PLAN:   MRI Right knee  Hold out of exercise until MRI  NSAIDs and ice compresses to knee.   Will call her with results, possible scope if loose bodies are found on MRI.     All questions were answered, pt will contact us for questions or concerns in the interim.

## 2018-02-02 NOTE — ED PROVIDER NOTES
"Encounter Date: 2018    SCRIBE #1 NOTE: I, Lenore Winston, am scribing for, and in the presence of,  Dr. Fallon. I have scribed the entire note.       History     Chief Complaint   Patient presents with    Hypertension     feeling a little dizzy but i'm anxious     HPI     Time patient was seen by the provider: 10:35 AM    The patient is a 52 y.o. female with co-morbidities including: diverticulitis and GERD who presents to the ED with a complaint of incidentally noted high blood pressure yesterday while being seen in the sports medicine clinic as well as this morning in the pharmacy (186/120). She denies any hx of HTN and is not taking any blood pressure medication. She states that she often has reflux pain, but that pain is slightly different sometimes and is often noted as a sharp chest pain that radiates into the jaw or the throat. She notes experiencing a similar episode of chest pain last night  as well as this morning at 7 AM each lasting a few minutes and described as more "prickly" feeling than her usual GERD episodes. Pain is not present currently. Her father has a hx of mitral valve prolapse with regurgitation and MI in his late 70s, and none of her siblings have had an MI. She denies any CP on exertion or new headaches. Pt endorses some cold sx.     Review of patient's allergies indicates:  No Known Allergies  Past Medical History:   Diagnosis Date    Diverticulitis      Past Surgical History:   Procedure Laterality Date     SECTION       Family History   Problem Relation Age of Onset    Hypertension Mother     Peripheral vascular disease Mother     Hyperlipidemia Father     Heart disease Father      cad    Melanoma Father     Melanoma Sister     Breast cancer Neg Hx     Colon cancer Neg Hx     Ovarian cancer Neg Hx     Cancer Neg Hx      Social History   Substance Use Topics    Smoking status: Never Smoker    Smokeless tobacco: Never Used    Alcohol use Yes      Comment: " daily, beer last night     Review of Systems   Cardiovascular: Positive for chest pain.   Neurological: Negative for headaches.   All other systems reviewed and are negative.      Physical Exam     Initial Vitals [02/02/18 1022]   BP Pulse Resp Temp SpO2   (!) 148/63 82 18 97.8 °F (36.6 °C) 100 %      MAP       91.33         Physical Exam    Nursing note and vitals reviewed.         Gen/Constitutional: Interactive. No acute distress  Head: Normocephalic, Atraumatic  Neck: supple, no masses or LAD  Eyes: PERRLA, conjunctiva clear  Ears, Nose and Throat: No rhinorrhea or stridor.  Cardiac: Reg Rhythm, No murmur  Pulmonary: CTA Bilat, no wheezes, rhonchi, rales.  GI: Abdomen soft, non-tender, non-distended; no rebound or guarding  : No CVA tenderness.  Musculoskeletal: Extremities warm, well perfused, no erythema, no edema  Skin: No rashes  Neuro: Alert and Oriented x 3; No focal motor or sensory deficits.  No pronator drift in upper or lower extremities, no face droop, no dysarthria.  Normal finger to nose and heel to shin.  No ataxia.  Psych: Normal affect        ED Course   Procedures  Labs Reviewed   CBC W/ AUTO DIFFERENTIAL   COMPREHENSIVE METABOLIC PANEL   B-TYPE NATRIURETIC PEPTIDE   TROPONIN I     EKG Readings: (Independently Interpreted)   EKG: NSR, no MANISH's or STD's, non-specific twave pattern, no STEMI       Imaging Results          X-Ray Chest PA And Lateral (Final result)  Result time 02/02/18 11:55:07    Final result by Jodee Farfan MD (02/02/18 11:55:07)                 Impression:     No active cardiopulmonary disease. Scoliosis.      Electronically signed by: Dr. JODEE FARFAN MD  Date:     02/02/18  Time:    11:55              Narrative:    Comparison: None    Results: 2 views. Heart size and pulmonary vascularity are within normal limits. Lungs are well-expanded and clear. No pleural fluid or pneumothorax. Mild to moderate thoracic scoliosis.                              Clinical Tests:  Labs Test(s)  were ordered and reviewed by me.  Radiological study(s) were ordered and reviewed by me.  Medical test(s) were ordered and reviewed by me.    Pt presents with incidentally noted HTN yesterday in sports medicine clinic and again today at the pharmacy. Bps in the 180s/100s both of these times. Now BP is taken in the -168/63-97 systolic. The pt also endorses some minor chest discomfort last night and this morning at 7 AM. Each time lasted for a few minutes and felt similar to episodes of GERD she has had before. I considered MI/ACS, aortic dissection, PE, CHF exacerbation, hypertensive emergency, hypertensive urgency, asymptomatic HTN, stroke, among other diagnoses. Clinically I felt her chest sx were more likely due to GERD and not MI or ACS. Will check cardiac labs, EKG, CXR, and will continue to monitor pt's BP intermittently in the ER.     Cardiac labs and CXR unremarkable. Clinically I felt pt was having asymptomatic HTN and was safe for discharge and outpatient follow up with PCP. She was instructed to keep a diary of her BPs to help her PCP decide if she needs to be started on a BP medication., I did not feel she needed to be emergently started on a BP medication as her BP was non concerning here in the ER and she is asymptomatic. The patient was given strict return precautions and follow up instructions and expressed understanding of these.  The patient felt comfortable with the plan for discharge and I did as well.  Stable for DC.                      Scribe Attestation:   Scribe #1: I performed the above scribed service and the documentation accurately describes the services I performed. I attest to the accuracy of the note.        I, Dr. Tiago Fallon, personally performed the services described in this documentation. All medical record entries made by the scribe were at my direction and in my presence.  I have reviewed the chart and agree that the record reflects my personal performance and is  accurate and complete. Tiago Fallon MD.  6:22 PM 02/02/2018         ED Course      Clinical Impression:   The encounter diagnosis was Hypertension.    Disposition:   Disposition: Discharged  Condition: Stable                        Tiago Fallon MD  02/02/18 1821

## 2018-02-02 NOTE — ED NOTES
"Patient identifiers verified and correct for Ms Daly  C/C: Elevated B/P  APPEARANCE: awake and alert in NAD.  SKIN: warm, dry and intact. No breakdown or bruising.  MUSCULOSKELETAL: Patient moving all extremities spontaneously, no obvious swelling or deformities noted. Ambulates independently.  RESPIRATORY: Denies shortness of breath.Respirations unlabored.No cough   CARDIAC: Positive CP that is described as "tingling" , 2+ distal pulses; no peripheral edema  ABDOMEN: S/ND/NT, Denies nausea  : voids spontaneously, denies difficulty  Neurologic: AAO x 4; follows commands equal strength in all extremities; denies numbness/tingling. Denies dizziness, denies headaches    "

## 2018-02-07 ENCOUNTER — HOSPITAL ENCOUNTER (OUTPATIENT)
Dept: RADIOLOGY | Facility: HOSPITAL | Age: 53
Discharge: HOME | End: 2018-02-07
Attending: PHYSICIAN ASSISTANT
Payer: COMMERCIAL

## 2018-02-07 DIAGNOSIS — M23.41 LOOSE BODY IN KNEE, RIGHT KNEE: ICD-10-CM

## 2018-02-07 DIAGNOSIS — M25.561 ACUTE PAIN OF RIGHT KNEE: ICD-10-CM

## 2018-02-07 PROCEDURE — 73721 MRI JNT OF LWR EXTRE W/O DYE: CPT | Mod: 26,RT,, | Performed by: RADIOLOGY

## 2018-02-07 PROCEDURE — 73721 MRI JNT OF LWR EXTRE W/O DYE: CPT | Mod: TC,RT

## 2018-02-08 ENCOUNTER — TELEPHONE (OUTPATIENT)
Dept: SPORTS MEDICINE | Facility: CLINIC | Age: 53
End: 2018-02-08

## 2018-02-08 ENCOUNTER — OFFICE VISIT (OUTPATIENT)
Dept: INTERNAL MEDICINE | Facility: CLINIC | Age: 53
End: 2018-02-08
Payer: COMMERCIAL

## 2018-02-08 VITALS
HEIGHT: 64 IN | DIASTOLIC BLOOD PRESSURE: 80 MMHG | SYSTOLIC BLOOD PRESSURE: 150 MMHG | BODY MASS INDEX: 24.05 KG/M2 | WEIGHT: 140.88 LBS | HEART RATE: 72 BPM | OXYGEN SATURATION: 99 %

## 2018-02-08 DIAGNOSIS — I10 ESSENTIAL HYPERTENSION: Primary | ICD-10-CM

## 2018-02-08 PROCEDURE — 99999 PR PBB SHADOW E&M-EST. PATIENT-LVL III: CPT | Mod: PBBFAC,,, | Performed by: INTERNAL MEDICINE

## 2018-02-08 PROCEDURE — 3008F BODY MASS INDEX DOCD: CPT | Mod: S$GLB,,, | Performed by: INTERNAL MEDICINE

## 2018-02-08 PROCEDURE — 99213 OFFICE O/P EST LOW 20 MIN: CPT | Performed by: INTERNAL MEDICINE

## 2018-02-08 PROCEDURE — 99214 OFFICE O/P EST MOD 30 MIN: CPT | Mod: S$GLB,,, | Performed by: INTERNAL MEDICINE

## 2018-02-08 NOTE — TELEPHONE ENCOUNTER
I called the patient and left her a message asking her to return the call to discuss her MRI results.     When she returns the call her results are as follows:  Right knee   1. Moderate area of full-thickness cartilage loss of the lateral patella as above with subchondral edema/cystic changes.  Smaller area of full-thickness loss of the anterior lateral femoral condyle cartilage with subchondral edema.  2. Small reactive joint effusion with tiny calcified loose bodies posterior to the PCL.     Dr. Cheema recommends a steroid injection with Sanket Daugherty

## 2018-02-08 NOTE — PATIENT INSTRUCTIONS
Low-Salt Choices  Eating salt (sodium) can make your body retain too much water. Excess water makes your heart work harder. Canned, packaged, and frozen foods are easy to prepare, but they are often high in sodium. Here are some ideas for low-salt foods you can easily prepare yourself.    For Breakfast  · Fruit or fruit juice  · Bread or an English muffin  · Shredded wheat  · Corn tortillas  · Steamed rice, unsalted  · Hot cereal, regular (not instant) made without salt  Stay away from:  · Sausage, bernardo, ham  · Flour tortillas  · Packaged muffins, pancakes, and biscuits  For Lunch and Dinner  · Fresh fish, chicken, turkey, or meat-- baked, broiled, or roasted without salt  · Dry beans, cooked without salt  · Tofu, stir-fried without salt  Stay away from:  · Lunch meat  · Cheese  · Tomato juice and catsup  · Canned vegetables, soups, fish  · Packaged gravies and sauces  · Olives, pickles, relish  · Bottled salad dressings  For Snacks and Desserts  · Yogurt  · Popcorn, air popped, unsalted  Stay away from:  · Pies  · Canned and packaged puddings  · Pretzels, chips, crackers, and nuts--unless the label says unsalted  © 5753-2627 [employer], [address] [place] [address], [place] [address]. All rights reserved. This information is not intended as a substitute for professional medical care. Always follow your healthcare professional's instructions.

## 2018-02-08 NOTE — PROGRESS NOTES
Subjective:       Patient ID: Rhoda Daly is a 52 y.o. female.    Chief Complaint: Follow-up (ER f/u---elevated BP)    Pressure was higinio at sports med doctor's office.  F/u pressure at a pharmacy on 2/2 and pressure still higinio. Sent to ED and w/u was normal. Since then checking pressures and 140s-150s mostly.  No clear changes that may have brought on HTN.  Denies high sodium diet, not much etoh, no nsaid use.      Review of Systems   Constitutional: Negative for activity change, appetite change and unexpected weight change.   Eyes: Negative for visual disturbance.   Respiratory: Negative for cough, chest tightness and shortness of breath.    Cardiovascular: Negative for chest pain.   Gastrointestinal: Negative for abdominal distention and abdominal pain.   Genitourinary: Negative for difficulty urinating and urgency.        No breast lumps/masses/pain/nipple d/c in either breast     Skin: Negative for rash.       Objective:      Physical Exam   Constitutional: She is oriented to person, place, and time. She appears well-developed and well-nourished. No distress.   HENT:   Head: Normocephalic and atraumatic.   Eyes: Pupils are equal, round, and reactive to light. No scleral icterus.   Neck: Normal range of motion. No thyromegaly present.   Cardiovascular: Normal rate, regular rhythm and normal heart sounds.  Exam reveals no gallop and no friction rub.    No murmur heard.  Pulmonary/Chest: Effort normal and breath sounds normal. No respiratory distress. She has no wheezes. She has no rales.   Abdominal: Soft. Bowel sounds are normal. She exhibits no distension and no mass. There is no tenderness. There is no rebound and no guarding.   Musculoskeletal: Normal range of motion. She exhibits no edema or tenderness.   R knee normal rom, but mildly tender.  No calf tenderness, no leg swelling.   Lymphadenopathy:     She has no cervical adenopathy.   Neurological: She is alert and oriented to person, place, and time.    Skin: She is not diaphoretic.   Psychiatric: She has a normal mood and affect. Her speech is normal and behavior is normal. Cognition and memory are normal.       Assessment:       1. Essential hypertension        Plan:       Rhoda was seen today for follow-up.    Diagnoses and all orders for this visit:    Essential hypertension  -     Hypertension Digital Medicine (HDMP) Enrollment Order  -     Hypertension Digital Medicine (HDMP): Assign Onboarding Questionnaires  Offered HCTZ, she declines for now, but will track pressures.  I recommended that she d/c OCP.    Over 25 minutes spent with patient and majority in counseling and patient education.        Health Maintenance       Date Due Completion Date    Influenza Vaccine 08/01/2017 ---    Pap Smear with HPV Cotest 11/02/2019 11/2/2016    Mammogram 11/15/2019 11/15/2017    Lipid Panel 03/28/2022 3/28/2017    Colonoscopy 01/06/2025 1/6/2015    TETANUS VACCINE 02/08/2026 2/8/2016          Follow-up in about 2 months (around 4/8/2018).

## 2018-02-09 ENCOUNTER — PATIENT MESSAGE (OUTPATIENT)
Dept: ADMINISTRATIVE | Facility: OTHER | Age: 53
End: 2018-02-09

## 2018-02-09 ENCOUNTER — PATIENT MESSAGE (OUTPATIENT)
Dept: OBSTETRICS AND GYNECOLOGY | Facility: CLINIC | Age: 53
End: 2018-02-09

## 2018-02-09 ENCOUNTER — OFFICE VISIT (OUTPATIENT)
Dept: SPORTS MEDICINE | Facility: CLINIC | Age: 53
End: 2018-02-09
Payer: COMMERCIAL

## 2018-02-09 VITALS
SYSTOLIC BLOOD PRESSURE: 160 MMHG | DIASTOLIC BLOOD PRESSURE: 100 MMHG | BODY MASS INDEX: 23.9 KG/M2 | HEART RATE: 70 BPM | WEIGHT: 140 LBS | HEIGHT: 64 IN

## 2018-02-09 DIAGNOSIS — M23.41 LOOSE BODY IN KNEE, RIGHT KNEE: ICD-10-CM

## 2018-02-09 DIAGNOSIS — M22.41 CHONDROMALACIA PATELLAE OF RIGHT KNEE: ICD-10-CM

## 2018-02-09 DIAGNOSIS — M25.561 ACUTE PAIN OF RIGHT KNEE: Primary | ICD-10-CM

## 2018-02-09 PROCEDURE — 3008F BODY MASS INDEX DOCD: CPT | Mod: S$GLB,,, | Performed by: PHYSICIAN ASSISTANT

## 2018-02-09 PROCEDURE — 20610 DRAIN/INJ JOINT/BURSA W/O US: CPT | Mod: RT,S$GLB,, | Performed by: PHYSICIAN ASSISTANT

## 2018-02-09 PROCEDURE — 99214 OFFICE O/P EST MOD 30 MIN: CPT | Mod: 25,SA,S$GLB, | Performed by: PHYSICIAN ASSISTANT

## 2018-02-09 PROCEDURE — 99999 PR PBB SHADOW E&M-EST. PATIENT-LVL III: CPT | Mod: PBBFAC,,, | Performed by: PHYSICIAN ASSISTANT

## 2018-02-09 RX ORDER — TRIAMCINOLONE ACETONIDE 40 MG/ML
80 INJECTION, SUSPENSION INTRA-ARTICULAR; INTRAMUSCULAR
Status: COMPLETED | OUTPATIENT
Start: 2018-02-09 | End: 2018-02-09

## 2018-02-09 RX ORDER — BUPIVACAINE HYDROCHLORIDE 2.5 MG/ML
2 INJECTION, SOLUTION INFILTRATION; PERINEURAL
Status: COMPLETED | OUTPATIENT
Start: 2018-02-09 | End: 2018-02-09

## 2018-02-09 RX ORDER — LIDOCAINE HYDROCHLORIDE 10 MG/ML
2 INJECTION INFILTRATION; PERINEURAL
Status: COMPLETED | OUTPATIENT
Start: 2018-02-09 | End: 2018-02-09

## 2018-02-09 RX ADMIN — BUPIVACAINE HYDROCHLORIDE 5 MG: 2.5 INJECTION, SOLUTION INFILTRATION; PERINEURAL at 01:02

## 2018-02-09 RX ADMIN — LIDOCAINE HYDROCHLORIDE 2 ML: 10 INJECTION INFILTRATION; PERINEURAL at 01:02

## 2018-02-09 RX ADMIN — TRIAMCINOLONE ACETONIDE 80 MG: 40 INJECTION, SUSPENSION INTRA-ARTICULAR; INTRAMUSCULAR at 01:02

## 2018-02-09 NOTE — LETTER
February 11, 2018      Gissell Cheema MD  [address]  [place] [address]           [facility]  [address]  [facility] [address]  Phone: [phone]          Patient: Rhoda Daly   MR Number: [id number]   YOB: 1965   Date of Visit: 2/9/2018       Dear Dr. Gissell Cheema:    Thank you for referring Rhoda Daly to me for evaluation. Attached you will find relevant portions of my assessment and plan of care.    If you have questions, please do not hesitate to call me. I look forward to following Rhoda Daly along with you.    Sincerely,    Yoseph Daugherty III, PA-C    Enclosure  CC:  No Recipients    If you would like to receive this communication electronically, please contact [email or web address] or [phone] to request more information on [employer] access.    For providers and/or their staff who would like to refer a patient to [facility], please contact us through our one-stop-shop provider referral line, Kathy Schmidt, at [phone].    If you feel you have received this communication in error or would no longer like to receive these types of communications, please e-mail [email or web address]

## 2018-02-11 ENCOUNTER — HOSPITAL ENCOUNTER (EMERGENCY)
Facility: HOSPITAL | Age: 53
Discharge: HOME | End: 2018-02-11
Attending: FAMILY MEDICINE
Payer: COMMERCIAL

## 2018-02-11 ENCOUNTER — NURSE TRIAGE (OUTPATIENT)
Dept: ADMINISTRATIVE | Facility: CLINIC | Age: 53
End: 2018-02-11

## 2018-02-11 VITALS
TEMPERATURE: 99 F | HEIGHT: 64 IN | BODY MASS INDEX: 23.05 KG/M2 | RESPIRATION RATE: 15 BRPM | HEART RATE: 65 BPM | OXYGEN SATURATION: 100 % | DIASTOLIC BLOOD PRESSURE: 96 MMHG | WEIGHT: 135 LBS | SYSTOLIC BLOOD PRESSURE: 188 MMHG

## 2018-02-11 DIAGNOSIS — T50.905A MEDICATION SIDE EFFECT, INITIAL ENCOUNTER: Primary | ICD-10-CM

## 2018-02-11 PROCEDURE — 99283 EMERGENCY DEPT VISIT LOW MDM: CPT

## 2018-02-11 PROCEDURE — 99283 EMERGENCY DEPT VISIT LOW MDM: CPT | Mod: ,,, | Performed by: PHYSICIAN ASSISTANT

## 2018-02-11 NOTE — PROGRESS NOTES
CC: Right knee pain    52 y.o. Female  works for [employer], with a history of Right knee pain. She states that the pain is severe and not responding to any conservative care.  Pain began 4 days ago with no inciting injury or trauma. Her pain is located of varying positions but mostly in the posterior, anterior, and medial knee. She describes her pain as aching. Pain is usually worse with some type of activity. Since her pain began she has been noticing intermittent knee effusions. She has tried ibuprofen and ice with no significant pain improvement.     SANE score of 70%.     No mechanical symptoms, no instability    Is affecting ADLs.      Review of Systems   Constitution: Negative. Negative for chills, fever and night sweats.   HENT: Negative for congestion and headaches.    Eyes: Negative for blurred vision, left vision loss and right vision loss.   Cardiovascular: Negative for chest pain and syncope.   Respiratory: Negative for cough and shortness of breath.    Endocrine: Negative for polydipsia, polyphagia and polyuria.   Hematologic/Lymphatic: Negative for bleeding problem. Does not bruise/bleed easily.   Skin: Negative for dry skin, itching and rash.   Musculoskeletal: Negative for falls. Positive for knee pain and muscle weakness.   Gastrointestinal: Negative for abdominal pain and bowel incontinence.   Genitourinary: Negative for bladder incontinence and nocturia.   Neurological: Negative for disturbances in coordination, loss of balance and seizures.   Psychiatric/Behavioral: Negative for depression. The patient does not have insomnia.    Allergic/Immunologic: Negative for hives and persistent infections.   All other systems negative.    PAST MEDICAL HISTORY:   Past Medical History:   Diagnosis Date    Diverticulitis      PAST SURGICAL HISTORY:   Past Surgical History:   Procedure Laterality Date     SECTION       FAMILY HISTORY:   Family History   Problem Relation Age of Onset  "   Hypertension Mother     Peripheral vascular disease Mother     Hyperlipidemia Father     Heart disease Father      cad    Melanoma Father     Melanoma Sister     Breast cancer Neg Hx     Colon cancer Neg Hx     Ovarian cancer Neg Hx     Cancer Neg Hx      SOCIAL HISTORY:   Social History     Social History    Marital status:      Spouse name: N/A    Number of children: N/A    Years of education: N/A     Occupational History     [employer].      Social History Main Topics    Smoking status: Never Smoker    Smokeless tobacco: Never Used    Alcohol use Yes      Comment: daily, beer last night    Drug use: No    Sexual activity: Yes     Partners: Male     Birth control/ protection: OCP     Other Topics Concern    Not on file     Social History Narrative    , 2 kids, 16 and 20 and dog.    Close to parents and  mother.    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       MEDICATIONS:   Current Outpatient Prescriptions:     drospirenone-ethinyl estradiol (NELY) 3-0.02 mg per tablet, Take 1 tablet by mouth once daily., Disp: 30 tablet, Rfl: 3  ALLERGIES: Review of patient's allergies indicates:  No Known Allergies    VITAL SIGNS: BP (!) 160/100   Pulse 70   Ht 5' 4" (1.626 m)   Wt 63.5 kg (140 lb)   LMP 02/02/2018   BMI 24.03 kg/m²      PHYSICAL EXAMINATION  VITAL SIGNS: BP (!) 160/100   Pulse 70   Ht 5' 4" (1.626 m)   Wt 63.5 kg (140 lb)   LMP 02/02/2018   BMI 24.03 kg/m²    General:  The patient is alert and oriented x 3.  Mood is pleasant.  Observation of ears, eyes and nose reveal no gross abnormalities.  HEENT: NCAT, sclera nonicteric  Lungs: Respirations are equal and unlabored.    Right KNEE EXAMINATION     OBSERVATION / INSPECTION   Gait:   Nonantalgic   Alignment:  Neutral   Scars:   None   Muscle atrophy: Mild  Effusion:  Mild  Warmth:  None   Discoloration:   none     TENDERNESS / CREPITUS (T / C):          T / C      T / C   Patella   - / - "   Lateral joint line   - / -    Peripatellar medial  +  Medial joint line    - / -    Peripatellar lateral -  Medial plica   - / -    Patellar tendon -   Popliteal fossa  + / -    Quad tendon   -   Gastrocnemius   -   Prepatellar Bursa - / -   Quadricep   -   Tibial tubercle  -  Thigh/hamstring  -   Pes anserine/HS -  Fibula    -   ITB   - / -  Tibia     -   Tib/fib joint  - / -  LCL    -     MFC   - / -   MCL: Proximal  -    LFC   - / -    Distal   -          ROM: (* = pain)  PASSIVE   ACTIVE    Left :   5 / 0 / 145   5 / 0 / 145     Right :    2 / 0 / 140   0 / 0 / 135    Patellofemoral examination:  See above noted areas of tenderness.   Patella position    Subluxation / dislocation: Centered           Sup. / Inf;   Normal   Crepitus (PF):    Absent   Patellar Mobility:       Medial-lateral:   Normal    Superior-inferior:  Normal    Inferior tilt   Normal    Patellar tendon:  Normal   Lateral tilt:    Normal   J-sign:     None   Patellofemoral grind:   +       MENISCAL SIGNS:     Pain on terminal extension:  +  Pain on terminal flexion:  -  Kerris maneuver:  - for pain  Squat     + posterior joint pain    LIGAMENT EXAMINATION:  ACL / Lachman:  normal (-1 to 2mm)    PCL-Post.  drawer: normal 0 to 2mm  MCL- Valgus:  normal 0 to 2mm  LCL- Varus:  normal 0 to 2mm  Pivot shift: normal (Equal)   Dial Test: difference c/w other side   At 30° flexion: normal (< 5°)    At 90° flexion: normal (< 5°)   Reverse Pivot Shift:   normal (Equal)     STRENGTH: (* = with pain) PAINFUL SIDE   Quadricep   4+/5   Hamstrin/5    EXTREMITY NEURO-VASCULAR EXAMINATION:   Sensation:  Grossly intact to light touch all dermatomal regions.   Motor Function:  Fully intact motor function at hip, knee, foot and ankle    DTRs;  quadriceps and  achilles 2+.  No clonus and downgoing Babinski.    Vascular status:  DP and PT pulses 2+, brisk capillary refill, symmetric.     Other Findings:       X-rays:  including standing, weight  bearing AP and flexion bilateral knees, lateral and merchant images reviewed by me show:  No fracture, dislocation. Ossific foci noted of the posterior knee concerning for loose bodies.      MRI right knee 2/7/18: Reviewed personally by Dr. Cheema and myself  Moderate area of full-thickness cartilage loss of the lateral patella as above with subchondral edema/cystic changes.  Smaller area of full-thickness loss of the anterior lateral femoral condyle cartilage with subchondral edema.    Small reactive joint effusion with tiny calcified loose bodies posterior to the PCL and one located behind the medial patella.         ASSESSMENT:    Right Knee pain, acute   loose bodies originating for cartilage in knee joint    Chondromalacia patella    PLAN:   MRI Right knee discussed with the patient today  NSAIDs and ice compresses to knee.     Will try steroid knee injectio first and if no adequate improvement then patient will need scope.   PROCEDURE NOTE: right KNEE INJECTION  After time out was performed, including verification of patient ID, procedure, site and side, availability of information and equipment, review of safety issues, and agreement with consent, the procedure site was marked and the patient was prepped aseptically. A diagnostic and therapeutic injection of 2cc 40 mg kenalog and 1% lidocaine/0.5% sensorcaine was given under sterile technique using a 22g x 1.5 needle into the superior lateral right knee with the patient in the supine position.   The patient had no adverse reactions to the medication. Pain decreased. The patient was instructed to apply ice to the joint for 20 minutes and avoid strenuous activities for 24-36 hours following the injection. Patient was warned of possible blood sugar and/or blood pressure changes during that time. Told to call clinic or go to ED if the changes become concerning. Following that time, patient can resume regular activities.    She will call me if she decides to do PT.      RTC prn knee pain.     All questions were answered, pt will contact us for questions or concerns in the interim.

## 2018-02-12 ENCOUNTER — PATIENT OUTREACH (OUTPATIENT)
Dept: OTHER | Facility: OTHER | Age: 53
End: 2018-02-12

## 2018-02-12 NOTE — ED NOTES
Patient identifiers verified and correct for Ms Daly  C/C: Rash  APPEARANCE: awake and alert in NAD.  SKIN: warm, dry and intact. Cayden cheeks, right ear, SAUL, ESTUARDO, neck, scalp with slight pink color with no hives.  MUSCULOSKELETAL: Patient moving all extremities spontaneously, no obvious swelling or deformities noted. Ambulates independently.  RESPIRATORY: Denies shortness of breath.Respirations unlabored. Denies cough, denies fever  CARDIAC: Denies CP, 2+ distal pulses; no peripheral edema  ABDOMEN: S/ND/NT, Denies nausea  : voids spontaneously, denies difficulty  Neurologic: AAO x 4; follows commands equal strength in all extremities; denies numbness/tingling. Denies dizziness

## 2018-02-12 NOTE — TELEPHONE ENCOUNTER
"  Facial flushing.  Both arms red blotches and hot.      Reason for Disposition   [1] Looks infected (spreading redness, pus) AND [2] large red area (> 2 in. or 5 cm)    Answer Assessment - Initial Assessment Questions  1. APPEARANCE of RASH: "Describe the rash."       Both cheecks red, warm to touch.   Started yesterday 6pm , arms red and bloachy-   Started about 30 min  2. LOCATION: "Where is the rash located?"    face, arms,   3. NUMBER: "How many spots are there?"      3  4. SIZE: "How big are the spots?" (Inches, centimeters or compare to size of a coin)        Some 6 inches some 1 ich  5. ONSET: "When did the rash start?"      This am, and last night  6. ITCHING: "Does the rash itch?" If so, ask: "How bad is the itch?"  (Scale 1-10; or mild, moderate, severe)   irriated.   7. PAIN: "Does the rash hurt?" If so, ask: "How bad is the pain?"  (Scale 1-10; or mild, moderate, severe)   tender to touch  8. OTHER SYMPTOMS: "Do you have any other symptoms?" (e.g., fever)  none  9. PREGNANCY: "Is there any chance you are pregnant?" "When was your last menstrual period?"  no    Protocols used: ST RASH OR REDNESS - LOCALIZED AND CAUSE UNKNOWN-A-    Pt had knee steroid shot on friday and yesterday started with redness and warmth to face, not red blotches to arms, warm to touch and tender to touch.  Advised should be seen within next 4 hours.  Will go to ED.    "

## 2018-02-12 NOTE — PROGRESS NOTES
Last 5 Patient Entered Readings                                      Current 30 Day Average: 178/106     Recent Readings 2/9/2018    SBP (mmHg) 178    DBP (mmHg) 106    Pulse 69        Ms. Rhoda Daly is a 52 y.o. female who is newly enrolled in the Digital Medicine Hypertension Clinic.     The following information was reviewed/updated:  Preferred pharmacy   [facility]/pharm[facility] #[id number] - [place], [place] - [address]  [address] [address]  Phone: [phone] Fax: [phone]    Patient prefers a 90 days supply  Review of patient's allergies indicates:  No Known Allergies  Current Outpatient Prescriptions on File Prior to Visit   Medication Sig Dispense Refill    drospirenone-ethinyl estradiol (NELY) 3-0.02 mg per tablet Take 1 tablet by mouth once daily. 30 tablet 3     No current facility-administered medications on file prior to visit.        Reviewed non-pharmacologic therapies and impact on BP:    1. Low-sodium diet- 11 mmHg reduction 2-4 weeks. I have reviewed D.A.S.H diet sodium restrictions (<2000mg/day). Patient is monitoring her sodium intake. She does not add salt to her foods and she does add salt when cooking.   2. Exercise- 7 mmHg reduction 4-12 weeks. I have recommended patient engage in exercise as tolerated at least 30 minutes 5x per week to improve cardiovascular health.  Patient does not exercise. She did injure her knee and her goal is to get back to the gym.  3. Alcohol intake- 3 mmHg reduction 4-12 weeks. I have discussed with patient the maximum recommended number of 1 drink(s) per day for men/women. Patient is a social drinker. She consumes 1 drink when she does consume alcohol.     Explained that we expect patient to obtain several blood pressures per week at random times of day.   Our goal is to get  BP to consistently below 130/80mmHg and make the process convenient so patient can avoid extra trips to the office. Getting your blood pressure below 130/80mmHg (definition  of control) will reduce your risk for heart attack, kidney failure, stroke and death (as well as kidney failure, eye disease, & dementia).     Patient is not meeting the goal already.   When asked what the patient thinks is causing BP to be elevated, she states: she is unsure.    Instructed patient not to allow anyone else to use phone and BP cuff.   I'm not available for emergencies. Patient will call [facility] on-call ([phone] or [phone]) or 911 if needed.     Discussed appropriate BP measuring technique:  Before taking your blood pressure  Find a quiet place. You will need to listen for your heartbeat.  Roll up the sleeve on your left arm or remove any tight-sleeved clothing, if needed. (It's best to take your blood pressure from your left arm if you are right-handed.You can use the other arm if you have been told by your health care provider to do so.)  Rest in a chair next to a table for 5 to 10 minutes. (Your left arm should rest comfortably at heart level.)  Sit up straight with your back against the chair, legs uncrossed and on the ground.  Rest your forearm on the table with the palm of your hand facing up.  You should not talk, read the newspaper, or watch television during this process.    Patient and I agreed that she will continue to monitor blood pressure and sodium intake, and continue to remain adherent to medications.     I will plan to follow-up with the patient in 1 weeks.   Emailed patient link to [facility] HT[facility] webpage and my contact information in case she has any questions.       Last 5 Patient Entered Readings                                      Current 30 Day Average: 178/106     Recent Readings 2/9/2018    SBP (mmHg) 178    DBP (mmHg) 106    Pulse 69        51 yo female with a PMH of HTN, diverticulitis and GERD. Patient is currently not on BP medication. She was in the ED twice within the past month due to a drug interaction with a steroid knee injection. Patient has two readings  from MD appointments over this past two weeks where her readings were high. She was seen by PCP who recommended HCTZ however she declined and wanted to get her lifestyle in check. She is currently on birth control which could be related to her BP.    She is stopping birth control 2/16 and would like to see this response before starting a medication. I will monitor her readings over the next week and advised the patient if they remain elevated, I will recommend starting valsartan 80mg daily.

## 2018-02-12 NOTE — ED TRIAGE NOTES
Patient states she had steroid shot( Kenalog and Lidocaine) in right knee Friday night. Began having facial flushing/redness beginning last night. Today noticed redness on both arms with pain noted. No Benadryl. Positive itching, Denies SOB.

## 2018-02-12 NOTE — LETTER
Kenia Nicole, PharmD  [address] [place] [address]     Dear Rhoda Daly,    Welcome to the [facility] Digital [facility] Program!         My name is Kenia Nicole PharmD and I am your dedicated Digital Medicine clinician.  As an expert in medication management, I will help ensure that the medications you are taking continue to provide you with the intended benefits.      I am Lakeisha Kyle and I will be your health  for the duration of the program.  My  job is to help you identify lifestyle changes to improve your blood pressure control.  We will talk about nutrition, exercise, and other ways that you may be able to adjust your current habits to better your health. Together, we will work to improve your overall health and encourage you to meet your goals for a healthier lifestyle.    What we expect from YOU:    You will need to take blood pressure readings multiple times a week and no less than one reading per week.   It is important that you take your measurements at different times during the day, when possible.     What you should expect from your Digital Medicine Care Team:   We will provide you with education about high blood pressure, including lifestyle changes that could help you to control your blood pressure.   We will review your weekly readings and provide you with monthly blood pressure progress reports after you have been in the program for more than 30 days.   We will send monthly progress reports on your blood pressure control to your physician so they can follow along with your progress as well.    You will be able to reach me by phone at [phone] or through your [facility] account by clicking my name under Care Team on the right side of the home screen.    I look forward to working with you to achieve your blood pressure goals!    Sincerely,    Kenia Nicole PharmD  Your personal clinician    Please visit  [email or web address] to learn more about high blood pressure and what you can do lower your blood pressure.                                                                                           Rhoda Daly  [address] [address]

## 2018-02-12 NOTE — ED PROVIDER NOTES
Encounter Date: 2018    SCRIBE #1 NOTE: I, Tianna Varela, am scribing for, and in the presence of,  JADEN Marquez . I have scribed the following portions of the note - Other sections scribed: HPI & ROS .       History     Chief Complaint   Patient presents with    Medication Reaction     Pt reports redness to face beginning yesterday and no spreading down back of both upper extremities, began after receiving steroid shot on Friday. Denies SOB or change in respiratory status. No distress noted in triage.      52 y.o.  female with HTN and history of diverticulitis presents to the ED complaining of medication reaction symptoms attributed to an injection of 2 cc 40 mg kenalog and 1% lidocaine/0.5% sensorcaine in right knee 3 days PTA. Symptoms include redness/flushing to her cheeks, which began yesterday.  She noticed redness and warmth to bilateral upper arms since 6 pm tonight.  She contacted the on call nurse and was advised to come to the ER for evaluation as she is scheduled for a 3 hour flight at 7 am tomorrow morning.  . The reddened area is described as burning, tender, and warm. She denies swelling to tongue or throat, difficulty swallowing, or SOB. The right knee pain for which she received the injection has resolved at this time. She has not taken any OTC medications for her symptoms. The patient does not recall anything like this happening before.        The history is provided by the patient and medical records.     Review of patient's allergies indicates:  No Known Allergies  Past Medical History:   Diagnosis Date    Diverticulitis     Hypertension      Past Surgical History:   Procedure Laterality Date     SECTION         Social History   Substance Use Topics    Smoking status: Never Smoker    Smokeless tobacco: Never Used    Alcohol use Yes      Comment: daily, beer tonight     Review of Systems   Constitutional: Negative for fever.   HENT: Negative for trouble  swallowing.         (-) Tongue, lip, or throat swelling.   Eyes: Negative for visual disturbance.   Respiratory: Negative for shortness of breath.    Cardiovascular: Negative for chest pain.   Gastrointestinal: Negative for abdominal pain.   Endocrine: Negative for polyuria.   Genitourinary: Negative for dysuria.   Musculoskeletal: Negative for arthralgias.   Skin:        (+) Patchy redness to her cheeks, upper extremities, and chest.        Physical Exam     Initial Vitals [02/11/18 1917]   BP Pulse Resp Temp SpO2   (!) 196/114 85 15 98.7 °F (37.1 °C) 98 %      MAP       141.33         Physical Exam    Nursing note and vitals reviewed.  Constitutional: She appears well-developed and well-nourished.   HENT:   Head: Atraumatic.   Mouth/Throat: Oropharynx is clear and moist.   No tongue or lip swelling  1-1/2 inch area of erythema to bilateral upper cheeks.  No associated swelling, skin breakdown, or periorbital erythema.       Eyes: Conjunctivae and EOM are normal. Pupils are equal, round, and reactive to light.   Neck: Normal range of motion. Neck supple.   Cardiovascular: Normal rate and regular rhythm.   Pulmonary/Chest: Breath sounds normal. No respiratory distress. She has no wheezes. She has no rhonchi. She has no rales.   Abdominal: Soft. Bowel sounds are normal. There is no tenderness.   Neurological: She is alert and oriented to person, place, and time.   Skin: Rash noted.        Psychiatric: She has a normal mood and affect.         ED Course   Procedures  Labs Reviewed - No data to display          Medical Decision Making:   History:   Old Medical Records: I decided to obtain old medical records.       APC / Resident Notes:    Patient presents to the ER with chief complaint of rash ×2 days.  The patient had a Kenalog injection into the right knee 2 days ago in sports medicine clinic.  She notes resolution of her knee pain.  She reports redness in the bilateral cheeks beginning yesterday.  The patient says  she read this may be a side effect of the medication, but was concerned when she developed a rash to the bilateral upper arms as well this evening.  She does not have much itching and there is no evidence of angioedema or systemic ALLERGIC reaction.   Her presentation is most consistent with a non specific dermatitis, likely a side effect of the Kenalog injection as she denies any other new medications or topical treatments. I discussed the care of this patient with my supervising MD. I have advised that she may take Benadryl if she developed itching in these areas.  We feel it is safe for the patient to be discharged home at this time without additional treatment as we expect this to resolve on its own.  She will contact her PCP and sports medicine physician to arrange for follow-up within one week and is given strict ER return precautions.  The patient is comfortable with this plan.  She does have elevated blood pressure in the ED and reports that she is currently monitoring her blood pressure with her PCP prior to starting antihypertensives.  She was seen in our ED on 2/2 with documented pressure of 186/120 at the pharmacy.  She had normal EKG, chest x-ray and labs including troponin and BNP at that time.  She was advised on close follow-up with her PCP for asymptomatic hypertension.     Patient denies any chest pain or shortness of breath today.  She is stable for discharge.       I, Ana Rosa Chung, personally performed the services described in this documentation. All medical record entries made by the scribe were at my direction and in my presence.  I have reviewed the chart and agree that the record reflects my personal performance and is accurate and complete. Ana Rosa Chung, APC.  10:42 PM 02/11/2018         Scribe Attestation:   Scribe #1: I performed the above scribed service and the documentation accurately describes the services I performed. I attest to the accuracy of the note.            ED Course       Clinical Impression:   The encounter diagnosis was Medication side effect, initial encounter.                           JADEN Dumont  02/11/18 3800

## 2018-02-19 ENCOUNTER — PATIENT OUTREACH (OUTPATIENT)
Dept: OTHER | Facility: OTHER | Age: 53
End: 2018-02-19

## 2018-02-19 DIAGNOSIS — I10 ESSENTIAL HYPERTENSION: Primary | ICD-10-CM

## 2018-02-19 NOTE — PROGRESS NOTES
Last 5 Patient Entered Readings                                      Current 30 Day Average: 151/93     Recent Readings 2/19/2018 2/18/2018 2/17/2018 2/15/2018 2/15/2018    SBP (mmHg) 129 141 139 142 142    DBP (mmHg) 88 91 92 81 81    Pulse 66 65 69 78 -        Patient's BP average is above goal of <130/80.     Patient denies s/s of hypotension (lightheadedness, dizziness, nausea, fatigue) associated with low readings. Instructed patient to inform me if this occurs, patient confirms understanding.      Patient denies s/s of hypertension (SOB, CP, severe headaches, changes in vision) associated with high readings. Instructed patient to go to the ED if BP > 180/110 and accompanied by hypertensive s/s, patient confirms understanding.    Will continue to monitor regularly. Will follow up in 2-3 weeks, sooner if BP begins to trend upward or downward.    Patient has my contact information and knows to call with any concerns or clinical changes.     Current HTN regimen:    Would like another 2 weeks since she was traveling last week. She would like to go another week to work on diet and lifestyle before making adjustments in medications.

## 2018-02-20 ENCOUNTER — PATIENT OUTREACH (OUTPATIENT)
Dept: OTHER | Facility: OTHER | Age: 53
End: 2018-02-20

## 2018-02-20 NOTE — PROGRESS NOTES
Last 5 Patient Entered Readings                                      Current 30 Day Average: 150/92     Recent Readings 2/20/2018 2/19/2018 2/18/2018 2/17/2018 2/15/2018    SBP (mmHg) 136 129 141 139 142    DBP (mmHg) 90 88 91 92 81    Pulse 71 66 65 69 78          Hypertension Digital Medicine Program (HDMP): Health  Introduction    Introduced Mrs. Rhoda Daly to [facility]. Discussed health  role and recommended lifestyle modifications.    Diet (i.e. Low sodium, food labels): Patient states she eats out and cooks at home. Patient reports she does not eat process food. Patient states when she eats out she tries to chose from the low sodium option on the menu. Patient reports she has been eating more at home and now not using any salt when cooking her meals. Patient states she does not put additional salt on her meals when it is done. Patient report she reads food labels for sodium content and the serving size. Patient is very knowledgeable about what she needs to do with her eating habits and she is working on following a low sodium diet.  We discussed trying salt free seasonings so the food will not be so bland.     Exercise: Patient reports she has a fit bit to count her steps and next week she will start walking in the evenings.     Alcohol/Tobacco: Patient reports she rojo snot smoke but drinks occassionally 1 beer.     Medication Adherence: has been compliant with the medicaiton regimen.     Reviewed AHA recommendations:  Limit sodium intake to <2000mg/day  Perform 150 minutes of physical activity per week    Patient is aware of the importance of taking daily BP readings at various times of the day  Patient aware that the health  can be used as a resource for lifestyle modifications to help reduce or maintain a healthy BP  Patient is aware of the importance of medication adherence.  Patient is aware that HDMP team is not available for emergencies. Patient should call 911 or [facility] on Call if one  arises.

## 2018-03-01 DIAGNOSIS — Z30.41 ENCOUNTER FOR SURVEILLANCE OF CONTRACEPTIVE PILLS: ICD-10-CM

## 2018-03-01 RX ORDER — DROSPIRENONE AND ETHINYL ESTRADIOL TABLETS 0.02-3(28)
1 KIT ORAL DAILY
Qty: 28 TABLET | Refills: 3 | Status: SHIPPED | OUTPATIENT
Start: 2018-03-01 | End: 2018-03-08

## 2018-03-02 RX ORDER — VALSARTAN 80 MG/1
80 TABLET ORAL DAILY
Qty: 30 TABLET | Refills: 3 | Status: SHIPPED | OUTPATIENT
Start: 2018-03-02 | End: 2018-03-26 | Stop reason: SDUPTHER

## 2018-03-02 NOTE — PROGRESS NOTES
Last 5 Patient Entered Readings                                      Current 30 Day Average: 148/91     Recent Readings 3/2/2018 3/1/2018 2/28/2018 2/26/2018 2/25/2018    SBP (mmHg) 153 148 137 153 152    DBP (mmHg) 94 96 82 93 86    Pulse 73 83 78 76 65        Patient stated she is working well on her sodium intake then her exercise. She is not consistently exercising.     I am starting valsartan 80mg daily. I will follow-up in two weeks. We discussed side effects.

## 2018-03-06 ENCOUNTER — TELEPHONE (OUTPATIENT)
Dept: OBSTETRICS AND GYNECOLOGY | Facility: CLINIC | Age: 53
End: 2018-03-06

## 2018-03-06 NOTE — TELEPHONE ENCOUNTER
Contacted the pt regarding her appointment on 3/8/18. Pt was scheduled for WWE. Informed the pt that she is not due for her WWE until 11/2018. Inquired if the pt needed to see Dr. Oliva about anything else. Pt stated that she did want to discuss how she has been after discontinuing BC. Pt informed that her appointment type will be changed and that her appointment is still scheduled for 3/8 at 8:15AM. Pt verbalized understanding. Letter sent out.

## 2018-03-08 ENCOUNTER — OFFICE VISIT (OUTPATIENT)
Dept: OBSTETRICS AND GYNECOLOGY | Facility: CLINIC | Age: 53
End: 2018-03-08
Attending: OBSTETRICS & GYNECOLOGY
Payer: COMMERCIAL

## 2018-03-08 VITALS
DIASTOLIC BLOOD PRESSURE: 100 MMHG | BODY MASS INDEX: 23.26 KG/M2 | WEIGHT: 136.25 LBS | HEIGHT: 64 IN | SYSTOLIC BLOOD PRESSURE: 142 MMHG

## 2018-03-08 DIAGNOSIS — Z30.49 ENCOUNTER FOR SURVEILLANCE OF OTHER CONTRACEPTIVE: Primary | ICD-10-CM

## 2018-03-08 PROCEDURE — 3077F SYST BP >= 140 MM HG: CPT | Mod: CPTII,S$GLB,, | Performed by: OBSTETRICS & GYNECOLOGY

## 2018-03-08 PROCEDURE — 99213 OFFICE O/P EST LOW 20 MIN: CPT | Mod: S$GLB,,, | Performed by: OBSTETRICS & GYNECOLOGY

## 2018-03-08 PROCEDURE — 3080F DIAST BP >= 90 MM HG: CPT | Mod: CPTII,S$GLB,, | Performed by: OBSTETRICS & GYNECOLOGY

## 2018-03-08 PROCEDURE — 99999 PR PBB SHADOW E&M-EST. PATIENT-LVL II: CPT | Mod: PBBFAC,,, | Performed by: OBSTETRICS & GYNECOLOGY

## 2018-03-15 ENCOUNTER — PATIENT OUTREACH (OUTPATIENT)
Dept: OTHER | Facility: OTHER | Age: 53
End: 2018-03-15

## 2018-03-15 NOTE — PROGRESS NOTES
Last 5 Patient Entered Readings                                      Current 30 Day Average: 144/90     Recent Readings 3/15/2018 3/13/2018 3/12/2018 3/11/2018 3/10/2018    SBP (mmHg) 130 142 164 141 134    DBP (mmHg) 85 93 90 84 91    Pulse 69 68 68 77 82      Once reached plan to increase valsartan to 160mg daily and add chlorthalidone 12.5mg.

## 2018-03-19 NOTE — PROGRESS NOTES
Last 5 Patient Entered Readings                                      Current 30 Day Average: 141/89     Recent Readings 3/19/2018 3/18/2018 3/17/2018 3/16/2018 3/15/2018    SBP (mmHg) 128 105 135 157 130    DBP (mmHg) 82 84 87 92 85    Pulse 72 86 78 85 69      Patient's BP average is above goal of <130/80.     Patient denies s/s of hypotension (lightheadedness, dizziness, nausea, fatigue) associated with low readings. Instructed patient to inform me if this occurs, patient confirms understanding.      Patient denies s/s of hypertension (SOB, CP, severe headaches, changes in vision) associated with high readings. Instructed patient to go to the ED if BP > 180/110 and accompanied by hypertensive s/s, patient confirms understanding.    Will continue to monitor regularly. Will follow up in 2-3 weeks, sooner if BP begins to trend upward or downward.    Patient has my contact information and knows to call with any concerns or clinical changes.     Current HTN regimen:  Hypertension Medications             valsartan (DIOVAN) 80 MG tablet Take 1 tablet (80 mg total) by mouth once daily.      Patient had an increase in exercise by walking for 30 minutes a day for the past 5 days. Her BP is responding nicely to this change in lifestyle. Patient would like another two weeks to see how the medication continues to affect her BP and continuing to work on lifestyle. If she remains elevated, I will increase valsartan from 80mg daily to 160mg daily.

## 2018-03-19 NOTE — PROGRESS NOTES
"Chief Complaint: Contraception    Rohda Daly is a 52 y.o. female  presents to discuss contraceptive needs.  Patient has been on low dose OCP's.  It was recommended by her PCP that these be discontinued due to hypertension.      She recently stopped the OCP.  She has not had a period.  She denies hot flashes, night sweats.    BP (!) 142/100 (BP Location: Left arm, Patient Position: Sitting, BP Method: Small (Manual))   Ht 5' 4" (1.626 m)   Wt 61.8 kg (136 lb 3.9 oz)   LMP 2018 (Exact Date)   BMI 23.39 kg/m²     ROS:  GENERAL: No fever, chills, fatigability or weight loss.  VULVAR: No pain, no lesions and no itching.  VAGINAL: No relaxation, no itching, no discharge, no abnormal bleeding and no lesions.  ABDOMEN: No abdominal pain. Denies nausea. Denies vomiting. No diarrhea. No constipation  BREAST: Denies pain. No lumps. No discharge.  URINARY: No incontinence, no nocturia, no frequency and no dysuria.  CARDIOVASCULAR: No chest pain. No shortness of breath. No leg cramps.  NEUROLOGICAL: No headaches. No vision changes.    Physical exam:    Deferred    1. Encounter for surveillance of other contraceptive         Discussed that she may be in menopause.  However, discussed that she may have a withdrawal bleed in 1-2 weeks.  Recommended that she monitor cycles over the next 3 months.  Discussed that menopause is 1 year with no menses so we have to continue to monitor to see if she is menopausal or perimenopausal.      RTC in 1 year.  "

## 2018-03-22 ENCOUNTER — PATIENT OUTREACH (OUTPATIENT)
Dept: OTHER | Facility: OTHER | Age: 53
End: 2018-03-22

## 2018-03-25 ENCOUNTER — PATIENT MESSAGE (OUTPATIENT)
Dept: CARDIOLOGY | Facility: CLINIC | Age: 53
End: 2018-03-25

## 2018-03-25 ENCOUNTER — PATIENT MESSAGE (OUTPATIENT)
Dept: INTERNAL MEDICINE | Facility: CLINIC | Age: 53
End: 2018-03-25

## 2018-03-25 DIAGNOSIS — Z00.00 ROUTINE GENERAL MEDICAL EXAMINATION AT A HEALTH CARE FACILITY: Primary | ICD-10-CM

## 2018-03-26 ENCOUNTER — PATIENT OUTREACH (OUTPATIENT)
Dept: OTHER | Facility: OTHER | Age: 53
End: 2018-03-26

## 2018-03-26 ENCOUNTER — PATIENT MESSAGE (OUTPATIENT)
Dept: INTERNAL MEDICINE | Facility: CLINIC | Age: 53
End: 2018-03-26

## 2018-03-26 ENCOUNTER — PATIENT MESSAGE (OUTPATIENT)
Dept: OTHER | Facility: OTHER | Age: 53
End: 2018-03-26

## 2018-03-26 DIAGNOSIS — I10 ESSENTIAL HYPERTENSION: ICD-10-CM

## 2018-03-26 RX ORDER — VALSARTAN 80 MG/1
80 TABLET ORAL DAILY
Qty: 30 TABLET | Refills: 3
Start: 2018-03-26 | End: 2018-04-09 | Stop reason: SDUPTHER

## 2018-03-26 RX ORDER — VALSARTAN 80 MG/1
160 TABLET ORAL DAILY
Qty: 30 TABLET | Refills: 3
Start: 2018-03-26 | End: 2018-03-26 | Stop reason: SDUPTHER

## 2018-03-26 NOTE — PROGRESS NOTES
Last 5 Patient Entered Readings                                      Current 30 Day Average: 138/87     Recent Readings 3/26/2018 3/25/2018 3/24/2018 3/23/2018 3/22/2018    SBP (mmHg) 102 124 158 126 137    DBP (mmHg) 68 78 84 80 84    Pulse 76 76 67 69 76        Patient's BP average is above goal of <130/80.     Patient denies s/s of hypotension (lightheadedness, dizziness, nausea, fatigue) associated with low readings. Instructed patient to inform me if this occurs, patient confirms understanding.      Patient denies s/s of hypertension (SOB, CP, severe headaches, changes in vision) associated with high readings. Instructed patient to go to the ED if BP > 180/110 and accompanied by hypertensive s/s, patient confirms understanding.    Will continue to monitor regularly. Will follow up in 2-3 weeks, sooner if BP begins to trend upward or downward.    Patient has my contact information and knows to call with any concerns or clinical changes.     Current HTN regimen:  Hypertension Medications             valsartan (DIOVAN) 80 MG tablet Take 1 tablet (80 mg total) by mouth once daily.        Patient's blood pressure has remained elevated but coming down with valsartan 80mg daily. I am continuing her current medication regimen until follow-up.

## 2018-03-27 ENCOUNTER — LAB VISIT (OUTPATIENT)
Dept: LAB | Facility: HOSPITAL | Age: 53
End: 2018-03-27
Attending: INTERNAL MEDICINE
Payer: COMMERCIAL

## 2018-03-27 DIAGNOSIS — Z00.00 ROUTINE GENERAL MEDICAL EXAMINATION AT A HEALTH CARE FACILITY: ICD-10-CM

## 2018-03-27 LAB
ANION GAP SERPL CALC-SCNC: 8 MMOL/L
BUN SERPL-MCNC: 16 MG/DL
CALCIUM SERPL-MCNC: 9.7 MG/DL
CHLORIDE SERPL-SCNC: 105 MMOL/L
CHOLEST SERPL-MCNC: 255 MG/DL
CHOLEST/HDLC SERPL: 3.3 {RATIO}
CO2 SERPL-SCNC: 29 MMOL/L
CREAT SERPL-MCNC: 0.8 MG/DL
EST. GFR  (AFRICAN AMERICAN): >60 ML/MIN/1.73 M^2
EST. GFR  (NON AFRICAN AMERICAN): >60 ML/MIN/1.73 M^2
GLUCOSE SERPL-MCNC: 99 MG/DL
HDLC SERPL-MCNC: 78 MG/DL
HDLC SERPL: 30.6 %
LDLC SERPL CALC-MCNC: 161 MG/DL
NONHDLC SERPL-MCNC: 177 MG/DL
POTASSIUM SERPL-SCNC: 4 MMOL/L
SODIUM SERPL-SCNC: 142 MMOL/L
TRIGL SERPL-MCNC: 80 MG/DL

## 2018-03-27 PROCEDURE — 80048 BASIC METABOLIC PNL TOTAL CA: CPT

## 2018-03-27 PROCEDURE — 80061 LIPID PANEL: CPT

## 2018-03-27 PROCEDURE — 36415 COLL VENOUS BLD VENIPUNCTURE: CPT

## 2018-04-09 ENCOUNTER — PATIENT OUTREACH (OUTPATIENT)
Dept: OTHER | Facility: OTHER | Age: 53
End: 2018-04-09

## 2018-04-09 DIAGNOSIS — I10 ESSENTIAL HYPERTENSION: Primary | ICD-10-CM

## 2018-04-09 RX ORDER — VALSARTAN 80 MG/1
80 TABLET ORAL NIGHTLY
Qty: 30 TABLET | Refills: 3
Start: 2018-04-09 | End: 2018-04-16 | Stop reason: SDUPTHER

## 2018-04-09 NOTE — PROGRESS NOTES
Last 5 Patient Entered Readings                                      Current 30 Day Average: 132/83     Recent Readings 4/8/2018 4/5/2018 4/3/2018 4/1/2018 3/31/2018    SBP (mmHg) 132 122 109 119 130    DBP (mmHg) 79 79 69 72 82    Pulse 64 68 79 80 67      Patient's BP average is above goal of <130/80.     Patient denies s/s of hypotension (lightheadedness, dizziness, nausea, fatigue) associated with low readings. Instructed patient to inform me if this occurs, patient confirms understanding.      Patient denies s/s of hypertension (SOB, CP, severe headaches, changes in vision) associated with high readings. Instructed patient to go to the ED if BP > 180/110 and accompanied by hypertensive s/s, patient confirms understanding.    Will continue to monitor regularly. Will follow up in 2-3 weeks, sooner if BP begins to trend upward or downward.    Patient has my contact information and knows to call with any concerns or clinical changes.     Current HTN regimen:  Hypertension Medications             valsartan (DIOVAN) 80 MG tablet Take 1 tablet (80 mg total) by mouth once daily.

## 2018-04-16 ENCOUNTER — OFFICE VISIT (OUTPATIENT)
Dept: INTERNAL MEDICINE | Facility: CLINIC | Age: 53
End: 2018-04-16
Payer: COMMERCIAL

## 2018-04-16 VITALS
WEIGHT: 134.25 LBS | HEART RATE: 61 BPM | BODY MASS INDEX: 22.92 KG/M2 | SYSTOLIC BLOOD PRESSURE: 112 MMHG | HEIGHT: 64 IN | DIASTOLIC BLOOD PRESSURE: 66 MMHG

## 2018-04-16 DIAGNOSIS — I10 ESSENTIAL HYPERTENSION: ICD-10-CM

## 2018-04-16 DIAGNOSIS — Z00.00 ROUTINE GENERAL MEDICAL EXAMINATION AT A HEALTH CARE FACILITY: Primary | ICD-10-CM

## 2018-04-16 PROCEDURE — 99396 PREV VISIT EST AGE 40-64: CPT | Mod: S$GLB,,, | Performed by: INTERNAL MEDICINE

## 2018-04-16 PROCEDURE — 99999 PR PBB SHADOW E&M-EST. PATIENT-LVL III: CPT | Mod: PBBFAC,,, | Performed by: INTERNAL MEDICINE

## 2018-04-16 PROCEDURE — 3078F DIAST BP <80 MM HG: CPT | Mod: CPTII,S$GLB,, | Performed by: INTERNAL MEDICINE

## 2018-04-16 PROCEDURE — 3074F SYST BP LT 130 MM HG: CPT | Mod: CPTII,S$GLB,, | Performed by: INTERNAL MEDICINE

## 2018-04-16 RX ORDER — VALSARTAN 80 MG/1
80 TABLET ORAL NIGHTLY
Qty: 90 TABLET | Refills: 11 | Status: SHIPPED | OUTPATIENT
Start: 2018-04-16 | End: 2018-07-16 | Stop reason: CLARIF

## 2018-04-16 NOTE — PROGRESS NOTES
Subjective:       Patient ID: Rhoda Daly is a 52 y.o. female.    Chief Complaint: Annual Exam    Here for annual exam.    taking valsartan daily and pressures well controlled.    No complaints today.      Review of Systems   Constitutional: Negative for activity change, appetite change and unexpected weight change.   Eyes: Negative for visual disturbance.   Respiratory: Negative for cough, chest tightness and shortness of breath.    Cardiovascular: Negative for chest pain.   Gastrointestinal: Negative for abdominal distention and abdominal pain.   Genitourinary: Negative for difficulty urinating and urgency.        No breast lumps/masses/pain/nipple d/c in either breast     Skin: Negative for rash.       Objective:      Physical Exam   Constitutional: She is oriented to person, place, and time. She appears well-developed and well-nourished. No distress.   HENT:   Head: Normocephalic and atraumatic.   Eyes: Pupils are equal, round, and reactive to light. No scleral icterus.   Neck: Normal range of motion. No thyromegaly present.   Cardiovascular: Normal rate, regular rhythm and normal heart sounds.  Exam reveals no gallop and no friction rub.    No murmur heard.  Pulmonary/Chest: Effort normal and breath sounds normal. No respiratory distress. She has no wheezes. She has no rales.   Abdominal: Soft. Bowel sounds are normal. She exhibits no distension and no mass. There is no tenderness. There is no rebound and no guarding.   Musculoskeletal: Normal range of motion. She exhibits no edema or tenderness.   Lymphadenopathy:     She has no cervical adenopathy.   Neurological: She is alert and oriented to person, place, and time.   Skin: She is not diaphoretic.   Psychiatric: She has a normal mood and affect. Her speech is normal and behavior is normal. Cognition and memory are normal.       Assessment:       1. Essential hypertension        Plan:         Rhoda was seen today for annual exam.    Diagnoses and all  orders for this visit:    Essential hypertension  -     valsartan (DIOVAN) 80 MG tablet; Take 1 tablet (80 mg total) by mouth every evening.  Now well controlled.        Health Maintenance       Date Due Completion Date    Influenza Vaccine 08/01/2017 ---    Pap Smear with HPV Cotest 11/02/2019 11/2/2016    Mammogram 11/15/2019 11/15/2017    Lipid Panel 03/27/2023 3/27/2018    Colonoscopy 01/06/2025 1/6/2015    TETANUS VACCINE 02/08/2026 2/8/2016          Follow-up in about 1 year (around 4/16/2019).      The 10-year ASCVD risk score (Angela GOMEZ Jr., et al., 2013) is: 1.5%    Values used to calculate the score:      Age: 52 years      Sex: Female      Is Non- : No      Diabetic: No      Tobacco smoker: No      Systolic Blood Pressure: 112 mmHg      Is BP treated: Yes      HDL Cholesterol: 78 mg/dL      Total Cholesterol: 255 mg/dL

## 2018-04-21 ENCOUNTER — PATIENT MESSAGE (OUTPATIENT)
Dept: OTHER | Facility: OTHER | Age: 53
End: 2018-04-21

## 2018-04-30 ENCOUNTER — PATIENT MESSAGE (OUTPATIENT)
Dept: INTERNAL MEDICINE | Facility: CLINIC | Age: 53
End: 2018-04-30

## 2018-04-30 ENCOUNTER — PATIENT OUTREACH (OUTPATIENT)
Dept: OTHER | Facility: OTHER | Age: 53
End: 2018-04-30

## 2018-04-30 DIAGNOSIS — K21.9 GASTROESOPHAGEAL REFLUX DISEASE, ESOPHAGITIS PRESENCE NOT SPECIFIED: Primary | ICD-10-CM

## 2018-04-30 RX ORDER — PANTOPRAZOLE SODIUM 20 MG/1
20 TABLET, DELAYED RELEASE ORAL DAILY
Qty: 90 TABLET | Refills: 11 | Status: SHIPPED | OUTPATIENT
Start: 2018-04-30 | End: 2019-01-17

## 2018-04-30 NOTE — PROGRESS NOTES
Last 5 Patient Entered Readings                                      Current 30 Day Average: 123/78     Recent Readings 4/27/2018 4/25/2018 4/19/2018 4/19/2018 4/14/2018    SBP (mmHg) 133 109 124 131 131    DBP (mmHg) 81 84 78 76 76    Pulse 69 69 78 68 68        Patient's BP average is controlled based on 2017 ACC/AHA HTN guidelines of goal BP <130/80.      Patient denies s/s of hypotension (lightheadedness, dizziness, nausea, fatigue) associated with low readings. Instructed patient to inform me if this occurs, patient confirms understanding.      Patient denies s/s of hypertension (SOB, CP, severe headaches, changes in vision) associated with high readings. Instructed patient to go to the ED if BP > 180/110 and accompanied by hypertensive s/s, patient confirms understanding.     Patient's health , Lakeisha Kyle, will be following up every 3-4 weeks. I will continue to monitor regularly and will follow up in 2 months, sooner if BP begins to trend upward or downward.    Patient has my contact information and knows to call with any concerns or clinical changes.     Current HTN regimen:  Hypertension Medications             valsartan (DIOVAN) 80 MG tablet Take 1 tablet (80 mg total) by mouth every evening.      Patient's experiencing alopecia which is in 1% of the population with valsartan. I advised her to start a hair, nails and skin vitamin like biotin to help.

## 2018-05-24 ENCOUNTER — TELEPHONE (OUTPATIENT)
Dept: DERMATOLOGY | Facility: CLINIC | Age: 53
End: 2018-05-24

## 2018-05-24 NOTE — PROGRESS NOTES
Last 5 Patient Entered Readings                                      Current 30 Day Average: 112/78     Recent Readings 5/22/2018 5/18/2018 5/17/2018 5/16/2018 5/15/2018    SBP (mmHg) 102 107 102 119 100    DBP (mmHg) 72 76 69 95 64    Pulse 74 68 68 73 65        5/24-[facility]: Health  Follow Up  Left voicemail to follow up with Hannah Rhodadeangelo Daly.  Current BP average 112/78 mmHg is at goal, [<130/80].

## 2018-05-24 NOTE — TELEPHONE ENCOUNTER
Spoke with pt, scheduled for June 16th. Mailed reminder.    ----- Message from Fiona Covarrubias sent at 5/24/2018  9:50 AM CDT -----  Contact: pt at [phone]  Ajay moulton-pt is calling in ref to setting up appt before gary goes to the [facility].

## 2018-06-07 ENCOUNTER — PATIENT MESSAGE (OUTPATIENT)
Dept: INTERNAL MEDICINE | Facility: CLINIC | Age: 53
End: 2018-06-07

## 2018-06-07 NOTE — PROGRESS NOTES
Last 5 Patient Entered Readings                                      Current 30 Day Average: 110/77     Recent Readings 6/6/2018 5/31/2018 5/22/2018 5/18/2018 5/17/2018    SBP (mmHg) 109 105 102 107 102    DBP (mmHg) 86 70 72 76 69    Pulse 77 67 74 68 68        Digital Medicine: Health  Follow Up    Lifestyle Modifications:    1.Dietary Modifications (Sodium intake <2,000mg/day, food labels, dining out): Patient reports she still watching her sodium intake.    2.Physical Activity: Patient reports she still walking in the evenings.     3.Medication Therapy: Patient has been compliant with the medication regimen.    4.Patient has the following medication side effects/concerns: Patient reports she has no s/s of hypotension ( dizziness, LH, nausea, or fatigue) with low readings.   (Frequency/Alleviating factors/Precipitating factors, etc.)     Follow up with Mrs. Duong Daly completed. No further questions or concerns.     Patient's current 30 day average BP is at goal.    Plan: Will continue follow up to achieve health goals.

## 2018-06-18 ENCOUNTER — TELEPHONE (OUTPATIENT)
Dept: DERMATOLOGY | Facility: CLINIC | Age: 53
End: 2018-06-18

## 2018-06-18 NOTE — TELEPHONE ENCOUNTER
I spoke to the patient and explained that we needed to cancel her appointment for tomorrow due to Dr. Moss being out due to surgery and for her to call back to reschedule and if we are able to get her in sooner we would call her back and let her know.

## 2018-06-19 ENCOUNTER — OFFICE VISIT (OUTPATIENT)
Dept: DERMATOLOGY | Facility: CLINIC | Age: 53
End: 2018-06-19
Payer: COMMERCIAL

## 2018-06-19 DIAGNOSIS — B35.3 TINEA PEDIS OF LEFT FOOT: Primary | ICD-10-CM

## 2018-06-19 PROCEDURE — 99999 PR PBB SHADOW E&M-EST. PATIENT-LVL II: CPT | Mod: PBBFAC,,, | Performed by: DERMATOLOGY

## 2018-06-19 PROCEDURE — 87220 TISSUE EXAM FOR FUNGI: CPT | Mod: S$GLB,,, | Performed by: DERMATOLOGY

## 2018-06-19 PROCEDURE — 99213 OFFICE O/P EST LOW 20 MIN: CPT | Mod: 25,S$GLB,, | Performed by: DERMATOLOGY

## 2018-06-19 RX ORDER — TERBINAFINE HYDROCHLORIDE 250 MG/1
250 TABLET ORAL DAILY
Qty: 30 TABLET | Refills: 0 | Status: SHIPPED | OUTPATIENT
Start: 2018-06-19 | End: 2018-07-19

## 2018-06-19 NOTE — PROGRESS NOTES
Subjective:       Patient ID:  Rhoda Daly is a 53 y.o. female who presents for   Chief Complaint   Patient presents with    Rash     Patient present for rash on left foot x 1 month. Started on lateral foot and spread to plantar surface. Mildly pruritic. Tried moisturizer and lotrimin (1 week) without improvement. Right foot not affected. Hands not affected. Denies any new shoes, creams, products in the area. Never a similar rash.       Rash         Review of Systems   Skin: Positive for itching, rash and activity-related sunscreen use. Negative for recent sunburn.        Objective:    Physical Exam   Constitutional: She appears well-developed and well-nourished. No distress.   Neurological: She is alert and oriented to person, place, and time. She is not disoriented.   Psychiatric: She has a normal mood and affect.   Skin:   Areas Examined (abnormalities noted in diagram):   RUE Inspected  LUE Inspection Performed  RLE Inspected  LLE Inspection Performed              Diagram Legend     Erythematous scaling macule/papule c/w actinic keratosis       Vascular papule c/w angioma      Pigmented verrucoid papule/plaque c/w seborrheic keratosis      Yellow umbilicated papule c/w sebaceous hyperplasia      Irregularly shaped tan macule c/w lentigo     1-2 mm smooth white papules consistent with Milia      Movable subcutaneous cyst with punctum c/w epidermal inclusion cyst      Subcutaneous movable cyst c/w pilar cyst      Firm pink to brown papule c/w dermatofibroma      Pedunculated fleshy papule(s) c/w skin tag(s)      Evenly pigmented macule c/w junctional nevus     Mildly variegated pigmented, slightly irregular-bordered macule c/w mildly atypical nevus      Flesh colored to evenly pigmented papule c/w intradermal nevus       Pink pearly papule/plaque c/w basal cell carcinoma      Erythematous hyperkeratotic cursted plaque c/w SCC      Surgical scar with no sign of skin cancer recurrence      Open and closed  comedones      Inflammatory papules and pustules      Verrucoid papule consistent consistent with wart     Erythematous eczematous patches and plaques     Dystrophic onycholytic nail with subungual debris c/w onychomycosis     Umbilicated papule    Erythematous-base heme-crusted tan verrucoid plaque consistent with inflamed seborrheic keratosis     Erythematous Silvery Scaling Plaque c/w Psoriasis     See annotation    Lab Results   Component Value Date    ALT 16 02/02/2018    AST 22 02/02/2018    ALKPHOS 74 02/02/2018    BILITOT 0.5 02/02/2018       Assessment / Plan:        Tinea pedis of left foot KOH +  Lamisil cream or Lotrimin ultra cream to affected area and at least 1 inch around affected area 2 times/day for 1 month.    -     terbinafine HCl (LAMISIL) 250 mg tablet; Take 1 tablet (250 mg total) by mouth once daily.  Dispense: 30 tablet; Refill: 0             Follow-up if symptoms worsen or fail to improve.

## 2018-06-19 NOTE — PATIENT INSTRUCTIONS
Lamisil cream or Lotrimin ultra cream to affected area and at least 1 inch around affected area 2 times/day for 1 month.

## 2018-06-25 ENCOUNTER — PATIENT OUTREACH (OUTPATIENT)
Dept: OTHER | Facility: OTHER | Age: 53
End: 2018-06-25

## 2018-06-25 NOTE — PROGRESS NOTES
Last 5 Patient Entered Readings                                      Current 30 Day Average: 110/73     Recent Readings 6/20/2018 6/19/2018 6/12/2018 6/11/2018 6/9/2018    SBP (mmHg) 121 104 130 99 103    DBP (mmHg) 79 73 81 67 57    Pulse 73 68 70 70 58        Sent [facility] message to follow up with Ms. Rhoda Daly.    Per 30 day average, blood pressure is well controlled 110/73 mmHg. Encouraged adherence to low sodium diet and physical activity guidelines. Advised patient to call or message with questions or concerns. Follow up in 8 weeks.     Patient replied through [facility] stating she is hypotensive and symptomatic through fatigue and dizziness. I am reducing valsartan from 80mg daily to 40mg daily. I will follow-up in 2-4 weeks based on her blood pressure response to this change.

## 2018-07-02 ENCOUNTER — PATIENT MESSAGE (OUTPATIENT)
Dept: OTHER | Facility: OTHER | Age: 53
End: 2018-07-02

## 2018-07-16 ENCOUNTER — PATIENT OUTREACH (OUTPATIENT)
Dept: OTHER | Facility: OTHER | Age: 53
End: 2018-07-16

## 2018-07-16 DIAGNOSIS — I10 ESSENTIAL HYPERTENSION: Primary | ICD-10-CM

## 2018-07-16 RX ORDER — IRBESARTAN 75 MG/1
TABLET ORAL
Qty: 20 TABLET | Refills: 3 | Status: SHIPPED | OUTPATIENT
Start: 2018-07-16 | End: 2018-08-08 | Stop reason: SDUPTHER

## 2018-07-16 NOTE — PROGRESS NOTES
Last 5 Patient Entered Readings                                      Current 30 Day Average: 117/75     Recent Readings 7/14/2018 7/12/2018 7/9/2018 7/9/2018 7/9/2018    SBP (mmHg) 95 116 133 142 115    DBP (mmHg) 65 81 80 83 75    Pulse 81 75 70 65 67      Patient's BP average is controlled based on 2017 ACC/AHA HTN guidelines of goal BP <130/80.      Patient denies s/s of hypotension (lightheadedness, dizziness, nausea, fatigue) associated with low readings. Instructed patient to inform me if this occurs, patient confirms understanding.      Patient denies s/s of hypertension (SOB, CP, severe headaches, changes in vision) associated with high readings. Instructed patient to go to the ED if BP > 180/110 and accompanied by hypertensive s/s, patient confirms understanding.     Patient's health , Lakeisha Kyle, will be following up every 3-4 weeks. I will continue to monitor regularly and will follow up in 3-4 months, sooner if BP begins to trend upward or downward.    Patient has my contact information and knows to call with any concerns or clinical changes.     Current HTN regimen:  Hypertension Medications             irbesartan (AVAPRO) 75 MG tablet Take 1/2 tablet by mouth daily.      Patient is at goal on valsartan. I called her local pharmacy and she is one affected by the recall therefore I am adjusting valsartan to irbesartan. She will change valsartan 40mg daily to irbesartan 75mg taking 1/2 tablet daily. I advised her to contact us with any changes or problems.

## 2018-07-23 ENCOUNTER — PATIENT OUTREACH (OUTPATIENT)
Dept: OTHER | Facility: OTHER | Age: 53
End: 2018-07-23

## 2018-07-23 DIAGNOSIS — I10 ESSENTIAL HYPERTENSION: Primary | ICD-10-CM

## 2018-07-23 NOTE — PROGRESS NOTES
Last 5 Patient Entered Readings                                      Current 30 Day Average: 124/79     Recent Readings 7/23/2018 7/23/2018 7/23/2018 7/23/2018 7/23/2018    SBP (mmHg) 181 180 143 150 147    DBP (mmHg) 105 96 85 89 83    Pulse 61 61 76 72 70      Sent [facility] message regarding message left on my phone to call patient.     Patient returned my call stating she is experiencing hypotension symptoms, nausea, lightheadedness and dizziness, despite the meter showing high readings. She hasn't charged her cuff therefore I advised her to charge the cuff and I will follow-up tomorrow. She plans to charge the cuff, take a reading and if her reading is low, she will avoid irbesartan tonight. If she is high, she will continue irbesartan this evening. I will follow-up tomorrow to see how she is feeling and if she took the medication. She did state these symptoms have occurred for the past 4-6 weeks which is prior to the medication adjustments.    7/24: Patient is feeling better today and her blood pressure has dropped. She is still not herself therefore she will obtain non-fasting labs tomorrow at [facility] 7/25. I will follow-up to assess if any electrolyte abnormalities are taking place. If not, we will reassess blood pressure medication.     7/25: Patient's labs returned WNL. Patient continues to feel nauseous and dizziness on occasion. She states they do not appear together and she is not herself. She will call her PCP to set up an appointment ASAP. I will continue a daily chart review. She plans to bring her cuff with her to the PCP appointment to assess if her cuff is accurate.

## 2018-07-25 ENCOUNTER — LAB VISIT (OUTPATIENT)
Dept: LAB | Facility: OTHER | Age: 53
End: 2018-07-25
Payer: COMMERCIAL

## 2018-07-25 DIAGNOSIS — I10 ESSENTIAL HYPERTENSION: ICD-10-CM

## 2018-07-25 LAB
ANION GAP SERPL CALC-SCNC: 10 MMOL/L
BUN SERPL-MCNC: 18 MG/DL
CALCIUM SERPL-MCNC: 10 MG/DL
CHLORIDE SERPL-SCNC: 105 MMOL/L
CO2 SERPL-SCNC: 27 MMOL/L
CREAT SERPL-MCNC: 0.9 MG/DL
EST. GFR  (AFRICAN AMERICAN): >60 ML/MIN/1.73 M^2
EST. GFR  (NON AFRICAN AMERICAN): >60 ML/MIN/1.73 M^2
GLUCOSE SERPL-MCNC: 98 MG/DL
POTASSIUM SERPL-SCNC: 4.2 MMOL/L
SODIUM SERPL-SCNC: 142 MMOL/L

## 2018-07-25 PROCEDURE — 36415 COLL VENOUS BLD VENIPUNCTURE: CPT

## 2018-07-25 PROCEDURE — 80048 BASIC METABOLIC PNL TOTAL CA: CPT

## 2018-07-26 ENCOUNTER — OFFICE VISIT (OUTPATIENT)
Dept: INTERNAL MEDICINE | Facility: CLINIC | Age: 53
End: 2018-07-26
Payer: COMMERCIAL

## 2018-07-26 ENCOUNTER — NURSE TRIAGE (OUTPATIENT)
Dept: ADMINISTRATIVE | Facility: CLINIC | Age: 53
End: 2018-07-26

## 2018-07-26 ENCOUNTER — LAB VISIT (OUTPATIENT)
Dept: LAB | Facility: HOSPITAL | Age: 53
End: 2018-07-26
Attending: OBSTETRICS & GYNECOLOGY
Payer: COMMERCIAL

## 2018-07-26 VITALS
HEART RATE: 63 BPM | SYSTOLIC BLOOD PRESSURE: 130 MMHG | WEIGHT: 133.38 LBS | HEIGHT: 64 IN | OXYGEN SATURATION: 98 % | DIASTOLIC BLOOD PRESSURE: 84 MMHG | BODY MASS INDEX: 22.77 KG/M2

## 2018-07-26 DIAGNOSIS — N95.1 PERIMENOPAUSE: ICD-10-CM

## 2018-07-26 DIAGNOSIS — R42 DIZZINESS: ICD-10-CM

## 2018-07-26 DIAGNOSIS — R11.0 NAUSEA: ICD-10-CM

## 2018-07-26 DIAGNOSIS — R11.0 NAUSEA: Primary | ICD-10-CM

## 2018-07-26 LAB
ALBUMIN SERPL BCP-MCNC: 3.9 G/DL
ALP SERPL-CCNC: 85 U/L
ALT SERPL W/O P-5'-P-CCNC: 23 U/L
ANION GAP SERPL CALC-SCNC: 7 MMOL/L
AST SERPL-CCNC: 25 U/L
BILIRUB SERPL-MCNC: 0.7 MG/DL
BUN SERPL-MCNC: 15 MG/DL
CALCIUM SERPL-MCNC: 9.9 MG/DL
CHLORIDE SERPL-SCNC: 105 MMOL/L
CO2 SERPL-SCNC: 28 MMOL/L
CREAT SERPL-MCNC: 0.8 MG/DL
EST. GFR  (AFRICAN AMERICAN): >60 ML/MIN/1.73 M^2
EST. GFR  (NON AFRICAN AMERICAN): >60 ML/MIN/1.73 M^2
FSH SERPL-ACNC: 77.9 MIU/ML
GLUCOSE SERPL-MCNC: 98 MG/DL
POTASSIUM SERPL-SCNC: 4 MMOL/L
PROT SERPL-MCNC: 7 G/DL
SODIUM SERPL-SCNC: 140 MMOL/L

## 2018-07-26 PROCEDURE — 3075F SYST BP GE 130 - 139MM HG: CPT | Mod: CPTII,S$GLB,, | Performed by: INTERNAL MEDICINE

## 2018-07-26 PROCEDURE — 99214 OFFICE O/P EST MOD 30 MIN: CPT | Mod: S$GLB,,, | Performed by: INTERNAL MEDICINE

## 2018-07-26 PROCEDURE — 36415 COLL VENOUS BLD VENIPUNCTURE: CPT

## 2018-07-26 PROCEDURE — 80053 COMPREHEN METABOLIC PANEL: CPT

## 2018-07-26 PROCEDURE — 99999 PR PBB SHADOW E&M-EST. PATIENT-LVL III: CPT | Mod: PBBFAC,,, | Performed by: INTERNAL MEDICINE

## 2018-07-26 PROCEDURE — 3008F BODY MASS INDEX DOCD: CPT | Mod: CPTII,S$GLB,, | Performed by: INTERNAL MEDICINE

## 2018-07-26 PROCEDURE — 83001 ASSAY OF GONADOTROPIN (FSH): CPT

## 2018-07-26 PROCEDURE — 3079F DIAST BP 80-89 MM HG: CPT | Mod: CPTII,S$GLB,, | Performed by: INTERNAL MEDICINE

## 2018-07-26 RX ORDER — ONDANSETRON 4 MG/1
4 TABLET, FILM COATED ORAL EVERY 8 HOURS PRN
Qty: 50 TABLET | Refills: 1 | Status: SHIPPED | OUTPATIENT
Start: 2018-07-26 | End: 2019-01-17

## 2018-07-26 NOTE — TELEPHONE ENCOUNTER
Reason for Disposition   Patient wants to be seen    Protocols used: ST DIZZINESS-A-OH    Rhoda is calling to request an appointment and was sent to [facility] per scheduling due to reporting dizziness.  Rhoda reports she does not feel dizzy this morning but is more nauseated and would like to be seen today.   Appointment scheduled.

## 2018-07-27 ENCOUNTER — PATIENT MESSAGE (OUTPATIENT)
Dept: OBSTETRICS AND GYNECOLOGY | Facility: CLINIC | Age: 53
End: 2018-07-27

## 2018-07-29 NOTE — PROGRESS NOTES
HISTORY OF PRESENT ILLNESS:  She is a 53-year-old, patient of Dr. Cavazos,   comes in today with some dizziness and nausea for the last six weeks.  No   vomiting.  She has been on Avapro and Protonix, but she has been taking the   Protonix for reflux, but only takes on a p.r.n. type basis.  She is on Avapro   after switching off valsartan about 10 days ago.  She says the dizziness she is   having is not vertigo.  She has had vertigo in the past, but she does not think   this is it.  She says it is not really getting worse or better.  It has been   about the same.  She did stop some birth control pills in February and she did   have one cycle after that, but she has not had a cycle in a while.  She does not   think she is pregnant.  We actually did a urine pregnancy test, which was   negative.  No other medicine change.  There is nothing else that she has really   noticed.    PHYSICAL EXAMINATION:  GENERAL:  She is a well-appearing 53-year-old female in no acute distress.  NECK:  Supple.  She has no JVD.  Thyroid is not enlarged.  CARDIOVASCULAR:  S1 and S2, regular rate and rhythm without murmur, gallop or   rub.  ABDOMEN:  Soft, nontender, no hepatosplenomegaly.  No jaundice.  Really   unremarkable exam today.    ASSESSMENT:  Dizziness and nausea.  We wonder if this could be related to her   vertigo or perhaps her reflux.  We are going to get take her Protonix every day   like she should be taking instead of just p.r.n.  We will check a liver function   test to make sure nothing is going on there.  Also, her neuro exam was   unremarkable, so we will see if changing her Protonix helps this.  If it is   still bothering her in two weeks, the next step would be consultation to GI.      ETTA  dd: 07/29/2018 13:40:24 (CDT)  td: 07/30/2018 02:47:43 (CDT)  Doc ID   #[id number]  Job ID #[id number]    CC:

## 2018-07-31 ENCOUNTER — PATIENT MESSAGE (OUTPATIENT)
Dept: INTERNAL MEDICINE | Facility: CLINIC | Age: 53
End: 2018-07-31

## 2018-07-31 ENCOUNTER — PATIENT OUTREACH (OUTPATIENT)
Dept: OTHER | Facility: OTHER | Age: 53
End: 2018-07-31

## 2018-07-31 NOTE — PROGRESS NOTES
Last 5 Patient Entered Readings                                      Current 30 Day Average: 123/79     Recent Readings 7/26/2018 7/26/2018 7/26/2018 7/25/2018 7/24/2018    SBP (mmHg) 140 152 119 113 135    DBP (mmHg) 88 96 79 83 83    Pulse 68 71 69 74 74

## 2018-08-06 ENCOUNTER — PATIENT MESSAGE (OUTPATIENT)
Dept: ADMINISTRATIVE | Facility: OTHER | Age: 53
End: 2018-08-06

## 2018-08-06 NOTE — PROGRESS NOTES
Last 5 Patient Entered Readings                                      Current 30 Day Average: 124/80     Recent Readings 8/6/2018 8/3/2018 7/26/2018 7/26/2018 7/26/2018    SBP (mmHg) 114 100 140 152 119    DBP (mmHg) 75 68 88 96 79    Pulse 73 77 68 71 69        Digital Medicine: Health  Follow Up    Lifestyle Modifications:    1.Dietary Modifications (Sodium intake <2,000mg/day, food labels, dining out): Patient reports she still watching her sodium intake.     2.Physical Activity: Patient reports she still going for walks in the afternoon.     3.Medication Therapy: Patient has been compliant with the medication regimen.    4.Patient has the following medication side effects/concerns:  Patient denies any s/s of hypotension (dizziness, LH,nausea,or fatigue) with low readings. Patient denies any s/s of hypertension (CP,SOB,severe headaches,or change in vision) with high readings.   (Frequency/Alleviating factors/Precipitating factors, etc.)     Follow up with Mrs. Rhoda Daly completed. No further questions or concerns.     Patients' current 30 day average BP is at goal.     Plan: Will continue follow up to achieve health goals.

## 2018-08-07 ENCOUNTER — PATIENT OUTREACH (OUTPATIENT)
Dept: OTHER | Facility: OTHER | Age: 53
End: 2018-08-07

## 2018-08-07 DIAGNOSIS — I10 ESSENTIAL HYPERTENSION: ICD-10-CM

## 2018-08-07 NOTE — PROGRESS NOTES
Last 5 Patient Entered Readings                                      Current 30 Day Average: 125/81     Recent Readings 8/6/2018 8/3/2018 7/26/2018 7/26/2018 7/26/2018    SBP (mmHg) 114 100 140 152 119    DBP (mmHg) 75 68 88 96 79    Pulse 73 77 68 71 69      LMFCB to discuss how she is doing with her nausea and dizziness.    8/8: Patient's BP average is controlled based on 2017 ACC/AHA HTN guidelines of goal BP <130/80.      Patient denies s/s of hypotension (lightheadedness, dizziness, nausea, fatigue) associated with low readings. Instructed patient to inform me if this occurs, patient confirms understanding.      Patient denies s/s of hypertension (SOB, CP, severe headaches, changes in vision) associated with high readings. Instructed patient to go to the ED if BP > 180/110 and accompanied by hypertensive s/s, patient confirms understanding.     Patient's health , Lakeisha Kyle, will be following up every 3-4 weeks. I will continue to monitor regularly and will follow up in 3-4 months, sooner if BP begins to trend upward or downward.    Patient has my contact information and knows to call with any concerns or clinical changes.     Current HTN regimen:  Hypertension Medications             irbesartan (AVAPRO) 75 MG tablet Take 1/2 tablet by mouth daily.      Patient is no longer experiencing dizziness or nausea. She is tolerating irbesartan and I sent a new prescription for a 90 day supply.

## 2018-08-08 RX ORDER — IRBESARTAN 75 MG/1
TABLET ORAL
Qty: 50 TABLET | Refills: 1 | Status: SHIPPED | OUTPATIENT
Start: 2018-08-08 | End: 2018-10-05 | Stop reason: SDUPTHER

## 2018-09-05 NOTE — PROGRESS NOTES
Last 5 Patient Entered Readings                                      Current 30 Day Average: 113/72     Recent Readings 9/2/2018 8/28/2018 8/20/2018 8/19/2018 8/14/2018    SBP (mmHg) 108 119 99 133 104    DBP (mmHg) 74 61 70 78 74    Pulse 78 79 77 79 81      Patient is at goal and doing well. Chart reviewed and no BP medications require adjustments at this time.

## 2018-09-06 ENCOUNTER — PATIENT MESSAGE (OUTPATIENT)
Dept: OBSTETRICS AND GYNECOLOGY | Facility: CLINIC | Age: 53
End: 2018-09-06

## 2018-09-06 ENCOUNTER — TELEPHONE (OUTPATIENT)
Dept: OBSTETRICS AND GYNECOLOGY | Facility: CLINIC | Age: 53
End: 2018-09-06

## 2018-09-06 DIAGNOSIS — Z12.31 ENCOUNTER FOR SCREENING MAMMOGRAM FOR BREAST CANCER: Primary | ICD-10-CM

## 2018-09-06 NOTE — TELEPHONE ENCOUNTER
Patient requesting orders for mmg that was recommended to be complete in July. Orders placed. Patient will be scheduled.

## 2018-09-10 ENCOUNTER — OFFICE VISIT (OUTPATIENT)
Dept: SPORTS MEDICINE | Facility: CLINIC | Age: 53
End: 2018-09-10
Payer: COMMERCIAL

## 2018-09-10 VITALS
SYSTOLIC BLOOD PRESSURE: 132 MMHG | DIASTOLIC BLOOD PRESSURE: 85 MMHG | HEIGHT: 64 IN | BODY MASS INDEX: 22.71 KG/M2 | WEIGHT: 133 LBS | HEART RATE: 75 BPM

## 2018-09-10 DIAGNOSIS — M25.561 ACUTE PAIN OF RIGHT KNEE: Primary | ICD-10-CM

## 2018-09-10 DIAGNOSIS — M25.511 ACUTE PAIN OF RIGHT SHOULDER: ICD-10-CM

## 2018-09-10 DIAGNOSIS — M17.11 OSTEOARTHRITIS OF RIGHT KNEE, UNSPECIFIED OSTEOARTHRITIS TYPE: ICD-10-CM

## 2018-09-10 DIAGNOSIS — M77.8 SUBSCAPULARIS TENDINITIS OF RIGHT SHOULDER: ICD-10-CM

## 2018-09-10 DIAGNOSIS — M22.41 CHONDROMALACIA PATELLAE OF RIGHT KNEE: ICD-10-CM

## 2018-09-10 DIAGNOSIS — M75.21 BICEPS TENDINITIS ON RIGHT: ICD-10-CM

## 2018-09-10 PROCEDURE — 99999 PR PBB SHADOW E&M-EST. PATIENT-LVL III: CPT | Mod: PBBFAC,,, | Performed by: PHYSICIAN ASSISTANT

## 2018-09-10 PROCEDURE — 3075F SYST BP GE 130 - 139MM HG: CPT | Mod: CPTII,S$GLB,, | Performed by: PHYSICIAN ASSISTANT

## 2018-09-10 PROCEDURE — 99214 OFFICE O/P EST MOD 30 MIN: CPT | Mod: S$GLB,,, | Performed by: PHYSICIAN ASSISTANT

## 2018-09-10 PROCEDURE — 3008F BODY MASS INDEX DOCD: CPT | Mod: CPTII,S$GLB,, | Performed by: PHYSICIAN ASSISTANT

## 2018-09-10 PROCEDURE — 3079F DIAST BP 80-89 MM HG: CPT | Mod: CPTII,S$GLB,, | Performed by: PHYSICIAN ASSISTANT

## 2018-09-11 NOTE — PROGRESS NOTES
CC: Right knee pain    53 y.o. Female  works for [employer], with return of Right knee pain. Her pain in the right knee originally began about 7 months ago spontaneously. She received 80mg steroid knee injection with complete pain, swelling, and symptom relief until about 1 month ago. She began having a return of her anterior and posterior knee pain and swelling. She reports that most of her pain is located of the anterior lateral patella.  She states that the pain is moderate and not responding to any conservative care.      She reports 4 days of facial flushing and facial and upper body redness following her last steroid injection.     Her symptoms this time feel a little different than her previous knee pain and swelling.     No mechanical symptoms, no instability    Is affecting ADLs.     She also reports right shoulder pain that began 1 month ago after she reached back behind the passenger seat to grab her computer bag. She felt a sudden pain and pull of her anterior lateral shoulder. Pain has been present since and is not responding. Pain only occurs with movement.        Review of Systems   Constitution: Negative. Negative for chills, fever and night sweats.   HENT: Negative for congestion and headaches.    Eyes: Negative for blurred vision, left vision loss and right vision loss.   Cardiovascular: Negative for chest pain and syncope.   Respiratory: Negative for cough and shortness of breath.    Endocrine: Negative for polydipsia, polyphagia and polyuria.   Hematologic/Lymphatic: Negative for bleeding problem. Does not bruise/bleed easily.   Skin: Negative for dry skin, itching and rash.   Musculoskeletal: Negative for falls. Positive for knee pain and muscle weakness.   Gastrointestinal: Negative for abdominal pain and bowel incontinence.   Genitourinary: Negative for bladder incontinence and nocturia.   Neurological: Negative for disturbances in coordination, loss of balance and seizures.    Psychiatric/Behavioral: Negative for depression. The patient does not have insomnia.    Allergic/Immunologic: Negative for hives and persistent infections.   All other systems negative.    PAST MEDICAL HISTORY:   Past Medical History:   Diagnosis Date    Diverticulitis     Hypertension      PAST SURGICAL HISTORY:   Past Surgical History:   Procedure Laterality Date     SECTION       FAMILY HISTORY:   Family History   Problem Relation Age of Onset    Hypertension Mother     Peripheral vascular disease Mother         renal artery stenosis    Skin cancer Mother     Hyperlipidemia Father     Heart disease Father         cad    Melanoma Father     Skin cancer Sister     Breast cancer Neg Hx     Colon cancer Neg Hx     Ovarian cancer Neg Hx     Cancer Neg Hx      SOCIAL HISTORY:   Social History     Socioeconomic History    Marital status:      Spouse name: Not on file    Number of children: Not on file    Years of education: Not on file    Highest education level: Not on file   Social Needs    Financial resource strain: Not on file    Food insecurity - worry: Not on file    Food insecurity - inability: Not on file    Transportation needs - medical: Not on file    Transportation needs - non-medical: Not on file   Occupational History     Employer: [employer].    Tobacco Use    Smoking status: Never Smoker    Smokeless tobacco: Never Used   Substance and Sexual Activity    Alcohol use: Yes     Comment: daily, beer tonight    Drug use: No    Sexual activity: Yes     Partners: Male     Birth control/protection: None   Other Topics Concern    Are you pregnant or think you may be? Not Asked    Breast-feeding Not Asked   Social History Narrative    , 2 kids, 16dtr and 20 son and dog.    Close to parents and  mother.    Contract work marketing.    Walks on weekends       MEDICATIONS:   Current Outpatient Medications:     irbesartan (AVAPRO) 75 MG tablet,  "Take 1/2 tablet by mouth daily., Disp: 50 tablet, Rfl: 1    ondansetron (ZOFRAN) 4 MG tablet, Take 1 tablet (4 mg total) by mouth every 8 (eight) hours as needed for Nausea., Disp: 50 tablet, Rfl: 1    pantoprazole (PROTONIX) 20 MG tablet, Take 1 tablet (20 mg total) by mouth once daily., Disp: 90 tablet, Rfl: 11  ALLERGIES: Review of patient's allergies indicates:  No Known Allergies    VITAL SIGNS: /85   Pulse 75   Ht 5' 4" (1.626 m)   Wt 60.3 kg (133 lb)   BMI 22.83 kg/m²      PHYSICAL EXAMINATION  VITAL SIGNS: /85   Pulse 75   Ht 5' 4" (1.626 m)   Wt 60.3 kg (133 lb)   BMI 22.83 kg/m²    General:  The patient is alert and oriented x 3.  Mood is pleasant.  Observation of ears, eyes and nose reveal no gross abnormalities.  HEENT: NCAT, sclera nonicteric  Lungs: Respirations are equal and unlabored.    Right KNEE EXAMINATION     OBSERVATION / INSPECTION   Gait:   Nonantalgic   Alignment:  Neutral   Scars:   None   Muscle atrophy: Mild  Effusion:  Mild  Warmth:  None   Discoloration:   none     TENDERNESS / CREPITUS (T / C):          T / C      T / C   Patella   - / -   Lateral joint line   - / -    Peripatellar medial  -  Medial joint line    - / -    Peripatellar lateral +  Medial plica   - / -    Patellar tendon -   Popliteal fossa  + / -    Quad tendon   -   Gastrocnemius   -   Prepatellar Bursa - / -   Quadricep   -   Tibial tubercle  -  Thigh/hamstring  -   Pes anserine/HS -  Fibula    -   ITB   - / -  Tibia     -   Tib/fib joint  - / -  LCL    -     MFC   - / -   MCL: Proximal  -    LFC   Anterior + / -    Distal   -          ROM: (* = pain)  PASSIVE   ACTIVE    Left :   5 / 0 / 145   5 / 0 / 145     Right :    2 / 0 / 140   0 / 0 / 140    Patellofemoral examination:  See above noted areas of tenderness.   Patella position    Subluxation / dislocation: Centered           Sup. / Inf;   Normal   Crepitus (PF):    Absent   Patellar " Mobility:       Medial-lateral:   Normal    Superior-inferior:  Normal    Inferior tilt   Normal    Patellar tendon:  Normal   Lateral tilt:    Normal   J-sign:     None   Patellofemoral grind:   +       MENISCAL SIGNS:     Pain on terminal extension:  +  Pain on terminal flexion:  -  Kerris maneuver:  - for pain  Squat     -    LIGAMENT EXAMINATION:  ACL / Lachman:  normal (-1 to 2mm)    PCL-Post.  drawer: normal 0 to 2mm  MCL- Valgus:  normal 0 to 2mm  LCL- Varus:  normal 0 to 2mm  Pivot shift: normal (Equal)   Dial Test: difference c/w other side   At 30° flexion: normal (< 5°)    At 90° flexion: normal (< 5°)   Reverse Pivot Shift:   normal (Equal)     STRENGTH: (* = with pain) PAINFUL SIDE   Quadricep   4+/5   Hamstrin/5    EXTREMITY NEURO-VASCULAR EXAMINATION:   Sensation:  Grossly intact to light touch all dermatomal regions.   Motor Function:  Fully intact motor function at hip, knee, foot and ankle    DTRs;  quadriceps and  achilles 2+.  No clonus and downgoing Babinski.    Vascular status:  DP and PT pulses 2+, brisk capillary refill, symmetric.     Other Findings:    right Shoulder / Upper Extremity Exam    OBSERVATION:     Swelling  none  Deformity  none   Discoloration  none   Scapular winging none   Scars   none  Atrophy  none    TENDERNESS / CREPITUS (T/C):          T/C      T/C   Clavicle   -/-  SUPRAspinatus    -/-     AC Jt.    -/-  INFRAspinatus  -/-    SC Jt.    -/-  Deltoid    -/-      G. Tuberosity  -/-  LH BICEP groove  +/-   Acromion:  -/-  Midline Neck   -/-     Scapular Spine -/-  Trapezium   -/-   SMA Scapula  -/-  GH jt. line - post  -/-     Scapulothoracic  -/-         ROM: (* = with pain)  Right shoulder   Left shoulder        AROM (PROM)   AROM (PROM)   FE    170° (175°)*     170° (175°)     ER at 0°    60°  (65°)    60°  (65°)   ER at 90° ABD  90°  (90°)    90°  (90°)   IR at 90°  ABD   NA  (40°)     NA  (40°)      IR (spine level)   T10*     T10    STRENGTH: (* =  with pain) RIGHT SHOULDER  LEFT SHOULDER   SCAPTION   5/5    5/5    IR    5/5*    5/5   ER    5/5    5/5   BICEPS   5/5    5/5   Deltoid    5/5    5/5     SIGNS:  Painful side       NEER   +    OHEIDES  neg    DURON   neg    SPEEDS  +     DROP ARM   neg   BELLY PRESS Mild +   Superior escape none    LIFT-OFF  neg   X-Body ADD    neg    MOVING VALGUS neg        STABILITY TESTING    RIGHT SHOULDER   LEFT SHOULDER     Translation     Anterior  up face     up face    Posterior  up face    up face    Sulcus   < 10mm    < 10 mm     Signs   Apprehension   neg      neg       Relocation   no change     no change      Jerk test  neg     neg    EXTREMITY NEURO-VASCULAR EXAM    Sensation grossly intact to light touch all dermatomal regions.    DTR 2+ Biceps, Triceps, BR and Negative Navins sign   Grossly intact motor function at Elbow, Wrist and Hand   Distal pulses radial and ulnar 2+, brisk cap refill, symmetric.      NECK:  Painless FROM and spinous processes non-tender. Negative Spurlings sign.      OTHER FINDINGS:  + scapular dyskinesia         X-rays:  including standing, weight bearing AP and flexion bilateral knees, lateral and merchant images reviewed by me show:  No fracture, dislocation. Ossific foci noted of the posterior knee concerning for loose bodies.      MRI right knee 2/7/18: Reviewed personally by Dr. Cheema and myself  Moderate area of full-thickness cartilage loss of the lateral patella as above with subchondral edema/cystic changes.  Smaller area of full-thickness loss of the anterior lateral femoral condyle cartilage with subchondral edema.    Small reactive joint effusion with tiny calcified loose bodies posterior to the PCL and one located behind the medial patella.         ASSESSMENT:    Right Knee pain, acute   Possible loose bodies originating from cartilage in knee joint    Chondromalacia patella  Right knee osteoarthritis     Right shoulder pain, acute  Right anterior shoulder strain.  Likely biceps tendinitis and subscapularis strain    Knee pain today appears to be more from OA lesions seen on MRI    PLAN:     Will try Euflexxa injection to right knee and if no relief, then consider scope. Euflexxa prior auth placed.   NSAIDs and ice compresses to knee.  Give knee sleeve next time.      Ordered PT for right knee and shoulder.   PT for right knee pain that is mostly anterior and due to anterior lateral osteoarthritis. PT for bilateral hip, core, quad strengthening. Also PT for right shoulder pain likely due to biceps tendinitis and subscapularis strain with scapular dyskinesias. PT for scapular stabilization: Scapular dyskinesia Scapular stabilization - Rojelio protocol, 1-3x/week x 6-8 weeks with HEP    Will try PT for shoulder first per patient decision and consider sub-acromial shoulder injection if needed in future.     RTC for knee injections and in 8 weeks for shoulder recheck.     All questions were answered, pt will contact us for questions or concerns in the interim.

## 2018-09-12 ENCOUNTER — TELEPHONE (OUTPATIENT)
Dept: SPORTS MEDICINE | Facility: CLINIC | Age: 53
End: 2018-09-12

## 2018-09-12 DIAGNOSIS — M25.561 ACUTE PAIN OF RIGHT KNEE: Primary | ICD-10-CM

## 2018-09-12 DIAGNOSIS — M25.511 ACUTE PAIN OF RIGHT SHOULDER: ICD-10-CM

## 2018-09-12 DIAGNOSIS — M17.11 OSTEOARTHRITIS OF RIGHT KNEE, UNSPECIFIED OSTEOARTHRITIS TYPE: ICD-10-CM

## 2018-09-12 DIAGNOSIS — M75.21 BICEPS TENDINITIS ON RIGHT: ICD-10-CM

## 2018-09-12 DIAGNOSIS — M77.8 SUBSCAPULARIS TENDINITIS OF RIGHT SHOULDER: ICD-10-CM

## 2018-09-12 DIAGNOSIS — M22.41 CHONDROMALACIA PATELLAE OF RIGHT KNEE: ICD-10-CM

## 2018-09-16 ENCOUNTER — PATIENT MESSAGE (OUTPATIENT)
Dept: OTHER | Facility: OTHER | Age: 53
End: 2018-09-16

## 2018-09-17 ENCOUNTER — OFFICE VISIT (OUTPATIENT)
Dept: SPORTS MEDICINE | Facility: CLINIC | Age: 53
End: 2018-09-17
Payer: COMMERCIAL

## 2018-09-17 VITALS
HEIGHT: 64 IN | BODY MASS INDEX: 22.71 KG/M2 | WEIGHT: 133 LBS | HEART RATE: 65 BPM | SYSTOLIC BLOOD PRESSURE: 128 MMHG | DIASTOLIC BLOOD PRESSURE: 86 MMHG

## 2018-09-17 DIAGNOSIS — M22.41 CHONDROMALACIA PATELLAE OF RIGHT KNEE: ICD-10-CM

## 2018-09-17 DIAGNOSIS — M17.11 OSTEOARTHRITIS OF RIGHT KNEE, UNSPECIFIED OSTEOARTHRITIS TYPE: Primary | ICD-10-CM

## 2018-09-17 PROCEDURE — 20610 DRAIN/INJ JOINT/BURSA W/O US: CPT | Mod: RT,S$GLB,, | Performed by: PHYSICIAN ASSISTANT

## 2018-09-17 PROCEDURE — 99999 PR PBB SHADOW E&M-EST. PATIENT-LVL III: CPT | Mod: PBBFAC,,, | Performed by: PHYSICIAN ASSISTANT

## 2018-09-17 PROCEDURE — 99499 UNLISTED E&M SERVICE: CPT | Mod: S$GLB,,, | Performed by: PHYSICIAN ASSISTANT

## 2018-09-18 ENCOUNTER — HOSPITAL ENCOUNTER (OUTPATIENT)
Dept: RADIOLOGY | Facility: HOSPITAL | Age: 53
Discharge: HOME | End: 2018-09-18
Attending: OBSTETRICS & GYNECOLOGY
Payer: COMMERCIAL

## 2018-09-18 DIAGNOSIS — Z12.31 ENCOUNTER FOR SCREENING MAMMOGRAM FOR BREAST CANCER: ICD-10-CM

## 2018-09-18 PROCEDURE — 77063 BREAST TOMOSYNTHESIS BI: CPT | Mod: 26,,, | Performed by: RADIOLOGY

## 2018-09-18 PROCEDURE — 77067 SCR MAMMO BI INCL CAD: CPT | Mod: 26,,, | Performed by: RADIOLOGY

## 2018-09-18 PROCEDURE — 77063 BREAST TOMOSYNTHESIS BI: CPT | Mod: TC

## 2018-09-18 NOTE — PROGRESS NOTES
Patient is here for follow up of knee arthritis. Pt is requesting Euflexxa injection #1.  PMFH reviewed per encounter record. Has failed other conservative modalities including NSAIDS, activity modification, weight loss.    She has received good relief with injections in the past.     The prior shots were tolerated well.    PHYSICAL EXAMINATION:     General: The patient is alert and oriented x 3. Mood is pleasant.   Observation of ears, eyes and nose reveals no gross abnormalities. No   labored breathing observed.     No signs of infection or adverse reaction to knee.        Euflexxa Injection Procedure #1    A time out was performed, including verification of patient ID, procedure, site and side, availability of information and equipment, review of safety issues, and agreement with consent, the procedure site was marked.    The patient was prepped aseptically with povidone-iodine swabsticks. A diagnostic and therapeutic injection of 2cc Euflexxa was given under sterile technique using a 21.5g x 1.5 needle from the superior lateral aspect of the right knee Joint with the patient in the supine position.     Rhoda Daly had no adverse reactions to the medication. Pain decreased. female was instructed to apply ice to the joint for 20 minutes and avoid strenuous activities for 24-36 hours following the injection. female was warned of possible blood pressure changes during that time. Following that time, female can resume activities as prior to the injection.    female was reminded to call the clinic immediately for any adverse side effects as explained in clinic today.    RTC in 1 week for injection #2  All patients questions were answered. Patient was advised to call us with any concerns or questions.

## 2018-09-19 ENCOUNTER — PATIENT OUTREACH (OUTPATIENT)
Dept: OTHER | Facility: OTHER | Age: 53
End: 2018-09-19

## 2018-09-19 NOTE — ED TRIAGE NOTES
"Patient states he had B/P checked yesterday, 170s. Went to pharmacy this am  For check, 186/120. On no b/p meds. States 0700 "tingling" CP lasting several minutes.   " Awake

## 2018-09-19 NOTE — PROGRESS NOTES
Last 5 Patient Entered Readings                                      Current 30 Day Average: 110/70     Recent Readings 9/13/2018 9/9/2018 9/2/2018 8/28/2018 8/20/2018    SBP (mmHg) 117 109 108 119 99    DBP (mmHg) 70 76 74 61 70    Pulse 80 75 78 79 77        Patient's BP average is controlled based on 2017 ACC/AHA HTN guidelines of goal BP <130/80.      Patient denies s/s of hypotension (lightheadedness, dizziness, nausea, fatigue) associated with low readings. Instructed patient to inform me if this occurs, patient confirms understanding.      Patient denies s/s of hypertension (SOB, CP, severe headaches, changes in vision) associated with high readings. Instructed patient to go to the ED if BP > 180/110 and accompanied by hypertensive s/s, patient confirms understanding.     Patient called stating [facility] on [address] [facility] valsartan 80mg tablets and she will resume valsartan 40mg daily and stop irbesartan 75mg daily.    Patient's health , Lakeisha Kyle, will be following up every 3-4 weeks. I will continue to monitor regularly and will follow up in 3-4 months, sooner if BP begins to trend upward or downward.    Patient has my contact information and knows to call with any concerns or clinical changes.     Current HTN regimen:  Hypertension Medications             irbesartan (AVAPRO) 75 MG tablet Take 1/2 tablet by mouth daily.

## 2018-09-24 ENCOUNTER — OFFICE VISIT (OUTPATIENT)
Dept: SPORTS MEDICINE | Facility: CLINIC | Age: 53
End: 2018-09-24
Payer: COMMERCIAL

## 2018-09-24 VITALS
HEIGHT: 64 IN | HEART RATE: 72 BPM | BODY MASS INDEX: 22.71 KG/M2 | WEIGHT: 133 LBS | DIASTOLIC BLOOD PRESSURE: 79 MMHG | SYSTOLIC BLOOD PRESSURE: 115 MMHG

## 2018-09-24 DIAGNOSIS — M17.11 OSTEOARTHRITIS OF RIGHT KNEE, UNSPECIFIED OSTEOARTHRITIS TYPE: Primary | ICD-10-CM

## 2018-09-24 PROCEDURE — 20610 DRAIN/INJ JOINT/BURSA W/O US: CPT | Mod: RT,S$GLB,, | Performed by: PHYSICIAN ASSISTANT

## 2018-09-24 PROCEDURE — 99499 UNLISTED E&M SERVICE: CPT | Mod: S$GLB,,, | Performed by: PHYSICIAN ASSISTANT

## 2018-09-24 PROCEDURE — 99999 PR PBB SHADOW E&M-EST. PATIENT-LVL III: CPT | Mod: PBBFAC,,, | Performed by: PHYSICIAN ASSISTANT

## 2018-09-24 NOTE — PROGRESS NOTES
Patient is here for follow up of knee arthritis. Pt is requesting Euflexxa injection #2.  PMFH reviewed per encounter record. Has failed other conservative modalities including NSAIDS, activity modification, weight loss.    She has received good relief with injections in the past.     The prior shots were tolerated well.    PHYSICAL EXAMINATION:     General: The patient is alert and oriented x 3. Mood is pleasant.   Observation of ears, eyes and nose reveals no gross abnormalities. No   labored breathing observed.     No signs of infection or adverse reaction to knee.        Euflexxa Injection Procedure #2    A time out was performed, including verification of patient ID, procedure, site and side, availability of information and equipment, review of safety issues, and agreement with consent, the procedure site was marked.    The patient was prepped aseptically with povidone-iodine swabsticks. A diagnostic and therapeutic injection of 2cc Euflexxa was given under sterile technique using a 21.5g x 1.5 needle from the superior lateral aspect of the right knee Joint with the patient in the supine position.     Rhoda Daly had no adverse reactions to the medication. Pain decreased. female was instructed to apply ice to the joint for 20 minutes and avoid strenuous activities for 24-36 hours following the injection. female was warned of possible blood pressure changes during that time. Following that time, female can resume activities as prior to the injection.    female was reminded to call the clinic immediately for any adverse side effects as explained in clinic today.    RTC in 1 week for injection #3  All patients questions were answered. Patient was advised to call us with any concerns or questions.

## 2018-10-01 ENCOUNTER — OFFICE VISIT (OUTPATIENT)
Dept: SPORTS MEDICINE | Facility: CLINIC | Age: 53
End: 2018-10-01
Payer: COMMERCIAL

## 2018-10-01 VITALS
DIASTOLIC BLOOD PRESSURE: 87 MMHG | SYSTOLIC BLOOD PRESSURE: 136 MMHG | WEIGHT: 133 LBS | HEIGHT: 64 IN | HEART RATE: 64 BPM | BODY MASS INDEX: 22.71 KG/M2

## 2018-10-01 DIAGNOSIS — M25.561 ACUTE PAIN OF RIGHT KNEE: ICD-10-CM

## 2018-10-01 DIAGNOSIS — M22.41 CHONDROMALACIA PATELLAE OF RIGHT KNEE: ICD-10-CM

## 2018-10-01 DIAGNOSIS — M17.11 OSTEOARTHRITIS OF RIGHT KNEE, UNSPECIFIED OSTEOARTHRITIS TYPE: Primary | ICD-10-CM

## 2018-10-01 PROCEDURE — 99999 PR PBB SHADOW E&M-EST. PATIENT-LVL III: CPT | Mod: PBBFAC,,, | Performed by: PHYSICIAN ASSISTANT

## 2018-10-01 PROCEDURE — 99499 UNLISTED E&M SERVICE: CPT | Mod: S$GLB,,, | Performed by: PHYSICIAN ASSISTANT

## 2018-10-01 PROCEDURE — 20610 DRAIN/INJ JOINT/BURSA W/O US: CPT | Mod: RT,S$GLB,, | Performed by: PHYSICIAN ASSISTANT

## 2018-10-01 RX ORDER — VALSARTAN 80 MG/1
40 TABLET ORAL DAILY
COMMUNITY
End: 2019-08-30 | Stop reason: SDUPTHER

## 2018-10-01 NOTE — PROGRESS NOTES
"Patient is here for follow up of knee arthritis. Pt is requesting Euflexxa injection #3.  [facility] Has failed other conservative modalities including NSAIDS, activity modification, weight loss.    She has received good relief with injections in the past.     The prior shots were tolerated well.    PHYSICAL EXAMINATION:     General: The patient is alert and oriented x 3. Mood is pleasant.   Observation of ears, eyes and nose reveals no gross abnormalities. No   labored breathing observed.     No signs of infection or adverse reaction to knee.    Euflexxa Injection Procedure #3    A time out was performed, including verification of patient ID, procedure, site and side, availability of information and equipment, review of safety issues, and agreement with consent, the procedure site was marked.    The patient was prepped aseptically with povidone-iodine swabsticks. A diagnostic and therapeutic injection of 2cc Euflexxa was given under sterile technique using a 21.5g x 1.5 needle from the anterolateral aspect of the right knee Joint with the patient in the supine position.     Rhoda Daly had no adverse reactions to the medication. Pain decreased. female was instructed to apply ice to the joint for 20 minutes and avoid strenuous activities for 24-36 hours following the injection. female was warned of possible blood pressure changes during that time. Following that time, female can resume activities as prior to the injection.    female was reminded to call the clinic immediately for any adverse side effects as explained in clinic today.    RTC prn for knee pain in 6 weeks.     Having her follow up with Dr. Cheema in 6 weeks because she is not getting pain relief with injection and is having a return of "catching" in her knee.   She also just started PT.   All patients questions were answered. Patient was advised to call us with any concerns or questions.       "

## 2018-10-05 ENCOUNTER — PATIENT OUTREACH (OUTPATIENT)
Dept: OTHER | Facility: OTHER | Age: 53
End: 2018-10-05

## 2018-10-05 NOTE — PROGRESS NOTES
Last 5 Patient Entered Readings                                      Current 30 Day Average: 108/71     Recent Readings 9/28/2018 9/23/2018 9/13/2018 9/9/2018 9/2/2018    SBP (mmHg) 99 106 117 109 108    DBP (mmHg) 70 69 70 76 74    Pulse 93 78 80 75 78        Patient's BP average is controlled based on 2017 ACC/AHA HTN guidelines of goal BP <130/80.      Patient denies s/s of hypotension (lightheadedness, dizziness, nausea, fatigue) associated with low readings. Instructed patient to inform me if this occurs, patient confirms understanding.      Patient denies s/s of hypertension (SOB, CP, severe headaches, changes in vision) associated with high readings. Instructed patient to go to the ED if BP > 180/110 and accompanied by hypertensive s/s, patient confirms understanding.     Patient's health , Lakeisha Kyle, will be following up every 3-4 weeks. I will continue to monitor regularly and will follow up in 1-4 months, sooner if BP begins to trend upward or downward.    Patient has my contact information and knows to call with any concerns or clinical changes.     Current HTN regimen:  Hypertension Medications             valsartan (DIOVAN) 80 MG tablet Take 40 mg by mouth once daily.

## 2018-10-11 ENCOUNTER — PATIENT OUTREACH (OUTPATIENT)
Dept: OTHER | Facility: OTHER | Age: 53
End: 2018-10-11

## 2018-10-11 NOTE — PROGRESS NOTES
Last 5 Patient Entered Readings                                      Current 30 Day Average: 111/69     Recent Readings 10/11/2018 10/8/2018 10/7/2018 10/7/2018 9/28/2018    SBP (mmHg) 105 99 139 158 99    DBP (mmHg) 61 63 82 91 70    Pulse 79 73 75 78 93        Digital Medicine: Health  Follow Up    Lifestyle Modifications:    1.Dietary Modifications (Sodium intake <2,000mg/day, food labels, dining out): Patient reports she still watching her sodium intake closely.     2.Physical Activity: Patient reports she still goes for walks in the afternoon.     3.Medication Therapy: Patient has been compliant with the medication regimen.    4.Patient has the following medication side effects/concerns: Patient denies any s/s of hypotension (dizziness,LH,nausea, or fatigue) with low readings. Patient denies any s/s of hypertension (CP,SOB,severe headaches, or change in vision) with high readings.   (Frequency/Alleviating factors/Precipitating factors, etc.)     Follow up with Mrs. Rhoda Daly completed. No further questions or concerns.     Patient's current 30 day averaeg BP is at goal.     Plan: Will continue to follow up to achieve health goals.

## 2018-11-12 ENCOUNTER — OFFICE VISIT (OUTPATIENT)
Dept: SPORTS MEDICINE | Facility: CLINIC | Age: 53
End: 2018-11-12
Payer: COMMERCIAL

## 2018-11-12 VITALS
HEART RATE: 70 BPM | HEIGHT: 64 IN | BODY MASS INDEX: 22.71 KG/M2 | WEIGHT: 133 LBS | DIASTOLIC BLOOD PRESSURE: 77 MMHG | SYSTOLIC BLOOD PRESSURE: 110 MMHG

## 2018-11-12 DIAGNOSIS — M25.561 KNEE PAIN, RIGHT ANTERIOR: Primary | ICD-10-CM

## 2018-11-12 PROCEDURE — 3078F DIAST BP <80 MM HG: CPT | Mod: CPTII,S$GLB,, | Performed by: ORTHOPAEDIC SURGERY

## 2018-11-12 PROCEDURE — 3008F BODY MASS INDEX DOCD: CPT | Mod: CPTII,S$GLB,, | Performed by: ORTHOPAEDIC SURGERY

## 2018-11-12 PROCEDURE — 99999 PR PBB SHADOW E&M-EST. PATIENT-LVL III: CPT | Mod: PBBFAC,,, | Performed by: ORTHOPAEDIC SURGERY

## 2018-11-12 PROCEDURE — 99214 OFFICE O/P EST MOD 30 MIN: CPT | Mod: S$GLB,,, | Performed by: ORTHOPAEDIC SURGERY

## 2018-11-12 PROCEDURE — 3074F SYST BP LT 130 MM HG: CPT | Mod: CPTII,S$GLB,, | Performed by: ORTHOPAEDIC SURGERY

## 2018-11-12 NOTE — PROGRESS NOTES
CC: Right knee pain    53 y.o. Female  works for [employer], with return of Right knee pain. Her pain in the right knee originally began about 7 months ago spontaneously. She received 80mg steroid knee injection with complete pain, swelling, and symptom relief until about 1 month ago. She began having a return of her anterior and posterior knee pain and swelling. She reports that most of her pain is located of the anterior lateral patella.  She states that the pain is moderate and not responding to any conservative care.      She reports 4 days of facial flushing and facial and upper body redness following her last steroid injection.     Her symptoms this time feel a little different than her previous knee pain and swelling.     No mechanical symptoms, no instability    Is affecting ADLs.     She also reports right shoulder pain that began 1 month ago after she reached back behind the passenger seat to grab her computer bag. She felt a sudden pain and pull of her anterior lateral shoulder. Pain has been present since and is not responding. Pain only occurs with movement.      Had euflexxa series x 3, no help    Been in PT 5-6 weeks with [facility]    Review of Systems   Constitution: Negative. Negative for chills, fever and night sweats.   HENT: Negative for congestion and headaches.    Eyes: Negative for blurred vision, left vision loss and right vision loss.   Cardiovascular: Negative for chest pain and syncope.   Respiratory: Negative for cough and shortness of breath.    Endocrine: Negative for polydipsia, polyphagia and polyuria.   Hematologic/Lymphatic: Negative for bleeding problem. Does not bruise/bleed easily.   Skin: Negative for dry skin, itching and rash.   Musculoskeletal: Negative for falls. Positive for knee pain and muscle weakness.   Gastrointestinal: Negative for abdominal pain and bowel incontinence.   Genitourinary: Negative for bladder incontinence and nocturia.    Neurological: Negative for disturbances in coordination, loss of balance and seizures.   Psychiatric/Behavioral: Negative for depression. The patient does not have insomnia.    Allergic/Immunologic: Negative for hives and persistent infections.   All other systems negative.    PAST MEDICAL HISTORY:   Past Medical History:   Diagnosis Date    Diverticulitis     Hypertension      PAST SURGICAL HISTORY:   Past Surgical History:   Procedure Laterality Date     SECTION       FAMILY HISTORY:   Family History   Problem Relation Age of Onset    Hypertension Mother     Peripheral vascular disease Mother         renal artery stenosis    Skin cancer Mother     Hyperlipidemia Father     Heart disease Father         cad    Melanoma Father     Skin cancer Sister     Breast cancer Neg Hx     Colon cancer Neg Hx     Ovarian cancer Neg Hx     Cancer Neg Hx      SOCIAL HISTORY:   Social History     Socioeconomic History    Marital status:      Spouse name: Not on file    Number of children: Not on file    Years of education: Not on file    Highest education level: Not on file   Social Needs    Financial resource strain: Not on file    Food insecurity - worry: Not on file    Food insecurity - inability: Not on file    Transportation needs - medical: Not on file    Transportation needs - non-medical: Not on file   Occupational History     Employer: [employer].    Tobacco Use    Smoking status: Never Smoker    Smokeless tobacco: Never Used   Substance and Sexual Activity    Alcohol use: Yes     Comment: daily, beer tonight    Drug use: No    Sexual activity: Yes     Partners: Male     Birth control/protection: None   Other Topics Concern    Are you pregnant or think you may be? Not Asked    Breast-feeding Not Asked   Social History Narrative    , 2 kids, 16dtr and 20 son and dog.    Close to parents and  mother.    Contract work marketing.    Walks on weekends  "      MEDICATIONS:   Current Outpatient Medications:     ondansetron (ZOFRAN) 4 MG tablet, Take 1 tablet (4 mg total) by mouth every 8 (eight) hours as needed for Nausea., Disp: 50 tablet, Rfl: 1    pantoprazole (PROTONIX) 20 MG tablet, Take 1 tablet (20 mg total) by mouth once daily., Disp: 90 tablet, Rfl: 11    valsartan (DIOVAN) 80 MG tablet, Take 40 mg by mouth once daily., Disp: , Rfl:   ALLERGIES: Review of patient's allergies indicates:  No Known Allergies    VITAL SIGNS: /77   Pulse 70   Ht 5' 4" (1.626 m)   Wt 60.3 kg (133 lb)   BMI 22.83 kg/m²      PHYSICAL EXAMINATION  VITAL SIGNS: /77   Pulse 70   Ht 5' 4" (1.626 m)   Wt 60.3 kg (133 lb)   BMI 22.83 kg/m²    General:  The patient is alert and oriented x 3.  Mood is pleasant.  Observation of ears, eyes and nose reveal no gross abnormalities.  HEENT: NCAT, sclera nonicteric  Lungs: Respirations are equal and unlabored.    Right KNEE EXAMINATION     OBSERVATION / INSPECTION   Gait:   Nonantalgic   Alignment:  Neutral   Scars:   None   Muscle atrophy: Mild  Effusion:  Mild  Warmth:  None   Discoloration:   none     TENDERNESS / CREPITUS (T / C):          T / C      T / C   Patella   - / -   Lateral joint line   - / -    Peripatellar medial  -  Medial joint line    - / -    Peripatellar lateral +  Medial plica   - / -    Patellar tendon -   Popliteal fossa  + / -    Quad tendon   -   Gastrocnemius   -   Prepatellar Bursa - / -   Quadricep   -   Tibial tubercle  -  Thigh/hamstring  -   Pes anserine/HS -  Fibula    -   ITB   - / -  Tibia     -   Tib/fib joint  - / -  LCL    -     MFC   - / -   MCL: Proximal  -    LFC   Anterior + / -    Distal   -          ROM: (* = pain)  PASSIVE   ACTIVE    Left :   5 / 0 / 145   5 / 0 / 145     Right :    2 / 0 / 140   0 / 0 / 140    Patellofemoral examination:  See above noted areas of tenderness.   Patella position    Subluxation / dislocation: Centered           Sup. / Inf;   Normal   Crepitus " (PF):    Absent   Patellar Mobility:       Medial-lateral:   Normal    Superior-inferior:  Normal    Inferior tilt   Normal    Patellar tendon:  Normal   Lateral tilt:    Normal   J-sign:     None   Patellofemoral grind:   +       MENISCAL SIGNS:     Pain on terminal extension:  +  Pain on terminal flexion:  -  Kerris maneuver:  - for pain  Squat     -    LIGAMENT EXAMINATION:  ACL / Lachman:  normal (-1 to 2mm)    PCL-Post.  drawer: normal 0 to 2mm  MCL- Valgus:  normal 0 to 2mm  LCL- Varus:  normal 0 to 2mm  Pivot shift: normal (Equal)   Dial Test: difference c/w other side   At 30° flexion: normal (< 5°)    At 90° flexion: normal (< 5°)   Reverse Pivot Shift:   normal (Equal)     STRENGTH: (* = with pain) PAINFUL SIDE   Quadricep   4+/5   Hamstrin/5    EXTREMITY NEURO-VASCULAR EXAMINATION:   Sensation:  Grossly intact to light touch all dermatomal regions.   Motor Function:  Fully intact motor function at hip, knee, foot and ankle    DTRs;  quadriceps and  achilles 2+.  No clonus and downgoing Babinski.    Vascular status:  DP and PT pulses 2+, brisk capillary refill, symmetric.     Other Findings:    right Shoulder / Upper Extremity Exam    OBSERVATION:     Swelling  none  Deformity  none   Discoloration  none   Scapular winging none   Scars   none  Atrophy  none    TENDERNESS / CREPITUS (T/C):          T/C      T/C   Clavicle   -/-  SUPRAspinatus    -/-     AC Jt.    -/-  INFRAspinatus  -/-    SC Jt.    -/-  Deltoid    -/-      G. Tuberosity  -/-  LH BICEP groove  +/-   Acromion:  -/-  Midline Neck   -/-     Scapular Spine -/-  Trapezium   -/-   SMA Scapula  -/-  GH jt. line - post  -/-     Scapulothoracic  -/-         ROM: (* = with pain)  Right shoulder   Left shoulder        AROM (PROM)   AROM (PROM)   FE    170° (175°)*     170° (175°)     ER at 0°    60°  (65°)    60°  (65°)   ER at 90° ABD  90°  (90°)    90°  (90°)   IR at 90°  ABD   NA  (40°)     NA  (40°)      IR (spine level)   T10*      T10    STRENGTH: (* = with pain) RIGHT SHOULDER  LEFT SHOULDER   SCAPTION   5/5    5/5    IR    5/5*    5/5   ER    5/5    5/5   BICEPS   5/5    5/5   Deltoid    5/5    5/5     SIGNS:  Painful side       NEER   +    OHEDIES  neg    DURON   neg    SPEEDS  +     DROP ARM   neg   BELLY PRESS Mild +   Superior escape none    LIFT-OFF  neg   X-Body ADD    neg    MOVING VALGUS neg        STABILITY TESTING    RIGHT SHOULDER   LEFT SHOULDER     Translation     Anterior  up face     up face    Posterior  up face    up face    Sulcus   < 10mm    < 10 mm     Signs   Apprehension   neg      neg       Relocation   no change     no change      Jerk test  neg     neg    EXTREMITY NEURO-VASCULAR EXAM    Sensation grossly intact to light touch all dermatomal regions.    DTR 2+ Biceps, Triceps, BR and Negative Navins sign   Grossly intact motor function at Elbow, Wrist and Hand   Distal pulses radial and ulnar 2+, brisk cap refill, symmetric.      NECK:  Painless FROM and spinous processes non-tender. Negative Spurlings sign.      OTHER FINDINGS:  + scapular dyskinesia         X-rays:  including standing, weight bearing AP and flexion bilateral knees, lateral and merchant images reviewed by me show:  No fracture, dislocation. Ossific foci noted of the posterior knee concerning for loose bodies.      MRI right knee 2/7/18: Reviewed personally by Dr. Cheema and myself  Moderate area of full-thickness cartilage loss of the lateral patella as above with subchondral edema/cystic changes.  Smaller area of full-thickness loss of the anterior lateral femoral condyle cartilage with subchondral edema.    Small reactive joint effusion with tiny calcified loose bodies posterior to the PCL and one located behind the medial patella.         ASSESSMENT:    Right Knee pain, acute   Possible loose bodies originating from cartilage in knee joint    Chondromalacia patella  Right knee osteoarthritis     Right shoulder pain, acute  Right  anterior shoulder strain. Likely biceps tendinitis and subscapularis strain    Knee pain today appears to be more from OA lesions seen on MRI    PLAN:     NSAIDs and ice compresses to knee.  Give knee sleeve next time.      Ordered PT for right knee and shoulder.   PT for right knee pain that is mostly anterior and due to anterior lateral osteoarthritis. PT for bilateral hip, core, quad strengthening. Also PT for right shoulder pain likely due to biceps tendinitis and subscapularis strain with scapular dyskinesias. PT for scapular stabilization: Scapular dyskinesia Scapular stabilization - Rojelio protocol, 1-3x/week x 6-8 weeks with HEP    Will try PT for shoulder first per patient decision and consider sub-acromial shoulder injection if needed in future.     All questions were answered, pt will contact us for questions or concerns in the interim.    PT with Tatiana  RTC 3 months

## 2018-11-20 ENCOUNTER — CLINICAL SUPPORT (OUTPATIENT)
Dept: REHABILITATION | Facility: HOSPITAL | Age: 53
End: 2018-11-20
Attending: ORTHOPAEDIC SURGERY
Payer: COMMERCIAL

## 2018-11-20 DIAGNOSIS — M25.561 CHRONIC PAIN OF RIGHT KNEE: ICD-10-CM

## 2018-11-20 DIAGNOSIS — G89.29 CHRONIC PAIN OF RIGHT KNEE: ICD-10-CM

## 2018-11-20 PROCEDURE — 97161 PT EVAL LOW COMPLEX 20 MIN: CPT

## 2018-11-20 NOTE — PLAN OF CARE
[facility]  Physical Therapy Initial Evaluation    Name: Rhoda Daly  Clinic Number: [id number]    Therapy Diagnosis:   Encounter Diagnosis   Name Primary?    Chronic pain of right knee      Physician: Gissell Cheema MD    Physician Orders: PT Eval and Treat    Medical Diagnosis:  M25.561 (ICD-10-CM) - Knee pain, right anterior  Date of Surgery:na   Evaluation Date: 2018  Authorization Period Expiration: 18  Plan of Care Certification Period: 18  Visit # / Visits authorized:     Time In: 700am  Time Out: 800am   Total Billable Time: 60 minutes    Precautions: Standard    Subjective   Date of onset:  Last year right knee pain x 1 year without XAVI  History of current condition - Foofe reports: right knee pain x  One year without any associated XAVI.  Pain is mostly anterior, but overall this has improved.  Pt has been going through therapy at [facility], and has had some success with ROM, however she states that she remains weak.  She also did three rounds of Euflexxa treatment which didn't giver her much relief.         Past Medical History:   Diagnosis Date    Diverticulitis     Hypertension      Rhoda Daly  has a past surgical history that includes  section.    Rhoda has a current medication list which includes the following prescription(s): ondansetron, pantoprazole, and valsartan.    Review of patient's allergies indicates:  No Known Allergies     Imaging, MRI studies:  Moderate area of full-thickness cartilage loss of the lateral patella as above with subchondral edema/cystic changes.  Smaller area of full-thickness loss of the anterior lateral femoral condyle cartilage with subchondral edema.    Small reactive joint effusion with tiny calcified loose bodies posterior to the PCL.     Prior Therapy: yes, 5-6 weeks at [facility]   Social History:   lives with their spouse, house, a few steps   Occupation: development   Prior Level of Function:  independent   Current Level of Function: independent with right knee soreness     Pain:  Current 0/10, worst 7/10, best 0/10   Location: right knee   Description: Aching, Dull, Sharp and Variable  Aggravating Factors: Flexing  Easing Factors: nothing and rest    Pts goals: return to normal squatting positions w/out pain     Objective             Myotomes:   Myotome  RIGHT    Left     MUSCLE TEST  WNL  Dim.  Pain  WNL  Dim.  Pain    Shoulder Abduction (C5) x   x     Elbow Flex (C6) x   x     Wrist Ext (C7) x   x     Finger Flex (C8) x   x     Finger Abd (TI) x   x     Hip Flexion (L2-L3)  x   x     Knee Extension (L3-L4)  x   x     Dorsiflexion (L4)  x   x     Hallux Extension (L5)  x   x     Ankle Eversion (S1-S2)  x   x            DTR WNL  Dim.  Absent    Right Bicep x     Left Bicep x     Right Tricep x     Left Tricep x     Right Brachiorad x     Left Brachiorad x     Right-Quad  x     Left-Quad  x     Right Ankle  x     Left Ankle  x     x  Sensation:     Neurologicalc Screening- Sensory  Right    Left      LEVEL  WNL  Dim.  Absent  WNL  Dim.  Absent    L1 (inguinal area)  x   x     L2 (anterior mid-thigh)  x   x     L3 (distal anterior thigh)  x   x     L4 (medial lower leg/foot)  x   x     L5 (lateral leg/ foot)  x   x     S1 (lateral side of foot)  x   x                       Neurologicalc Screening- Sensory        LEVEL  WNL  Dim.  Absent  WNL  Dim.  Absent    C4 (Skin over AC jt) x   x     C5 (radial side of elbow) x   x     C6 (Dorsal surface of thumb) x   x     C7 (Dorsal 3rd digit) x   x     C8 (Dorsal 5th digit) x   x           ROM: Active/PROM      Knee ROM  right  left    flexion 135 135   extension -7 -3               Strength:      Knee MMT  Right  Left    Flexion  4/5 4/5   Extension  4/5 4/5   Hip abduction  4/5 4/5   Hip Adduction  4/5 4/5                             Special Tests:     Knee Special Test  Right  Left    Varus -    Valgus -    Palpation Jt line  -    Lachman's  -    Pivot shift  -     McMurrays  -    Ant Drawer  -    Post Drawer  -    Dial Test (Post Lat Corner) -        GAIT: Normal  Squat test: WNL, 0-70, on biodex, minimal weight shift to LLE  Single leg squat:increase knee valgus right>left LE  SFMA Results: na  Thoracic spine rotational mobility:na          CMS Impairment/Limitation/Restriction for FOTO Knee Survey  Status Limitation G-Code CMS Severity Modifier  Intake 73% 27% Current Status CJ - At least 20 percent but less than 40 percent  Predicted 73% 27% Goal Status+ CJ - At least 20 percent but less than 40 percent  D/C Status CJ **only report if this is a one time visit    Therapist reviewed FOTO scores for Rhoda Daly on 11/20/2018.   FOTO documents entered into "[employer]" - see Media section.    Limitation Score: 27%  Category: Mobility    Current : CJ = at least 20% but < 40% impaired, limited or restricted  Goal: CI = at least 1% but < 20% impaired, limited or restricted  Discharge: na         TREATMENT     No treatment administered today, pt was not expecting any treatment or exercise today and didn't prepare appropriately.       Home Exercises and Patient Education Provided    Education provided re: advice     Written Home Exercises Provided: no.     Assessment   Rhoda is a 53 y.o. female referred to outpatient Physical Therapy with a medical diagnosis of right knee pain possibly associated with right knee OA.  Pt has been going to therapy at [facility] but was advised to come to [facility] for continued care.  She does have some OA in her right knee which may be associated with her intermittent pain.  She notes that she's better over the last 4 weeks, but is unable to squat fully for IADLs.   Pt presents with right knee pain that is limiting her ADLs and IADLs.  She admits to living a sedentary lifestyle and to inconsistency with her HEP from previous therapy.     Pt prognosis is Excellent.   Pt will benefit from skilled outpatient Physical Therapy to address the deficits stated  above and in the chart below, provide pt/family education, and to maximize pt's level of independence.     Plan of care discussed with patient: Yes  Pt's spiritual, cultural and educational needs considered and patient is agreeable to the plan of care and goals as stated below:     Anticipated Barriers for therapy: none/work schedule     Medical Necessity is demonstrated by the following  History  Co-morbidities and personal factors that may impact the plan of care Co-morbidities:   none    Personal Factors:   no deficits     low   Examination  Body Structures and Functions, activity limitations and participation restrictions that may impact the plan of care Body Regions:   lower extremities    Body Systems:    gross symmetry  ROM  strength  motor control  motor learning    Participation Restrictions:   none    Activity limitations:   Learning and applying knowledge  no deficits    General Tasks and Commands  no deficits    Communication  no deficits    Mobility  no deficits    Self care  no deficits    Domestic Life  no deficits    Interactions/Relationships  no deficits    Life Areas  no deficits    Community and Social Life  no deficits         low   Clinical Presentation stable and uncomplicated low   Decision Making/ Complexity Score: mild     Goals:  Short Term GOALS:  In 4-8 weeks, pt. will:    1. Decrease overall pain to 0-2/10 in the right knee,  on average with daily activities   2. Increase strength to the right knee to 5/5 grossly throughout,  in order to perform ADLs and IADLs more effectively   3. Improve FOTO intake score to 85 to demonstrate improvement with functional mobility and use  4. Independent with HEP  Long Term GOALS:  In 8-12 weeks, pt. will  1. Improve FOTO intake score to 95 to demonstrate improvement with functional mobility and use  2. Independent with HEP        Plan   Certification Period/Plan of care expiration: 11/20/2018 to 2/12/19.    Outpatient Physical Therapy 3 times weekly  for 10 weeks to include the following interventions: Manual Therapy, Moist Heat/ Ice, Neuromuscular Re-ed, Patient Education, Therapeutic Activites and Therapeutic Exercise.     Michael Muniz, PT

## 2018-11-27 ENCOUNTER — CLINICAL SUPPORT (OUTPATIENT)
Dept: REHABILITATION | Facility: HOSPITAL | Age: 53
End: 2018-11-27
Attending: ORTHOPAEDIC SURGERY
Payer: COMMERCIAL

## 2018-11-27 DIAGNOSIS — M25.561 CHRONIC PAIN OF RIGHT KNEE: ICD-10-CM

## 2018-11-27 DIAGNOSIS — G89.29 CHRONIC PAIN OF RIGHT KNEE: ICD-10-CM

## 2018-11-27 PROCEDURE — 97110 THERAPEUTIC EXERCISES: CPT

## 2018-11-27 PROCEDURE — 97112 NEUROMUSCULAR REEDUCATION: CPT

## 2018-11-27 NOTE — PROGRESS NOTES
Physical Therapy Daily Treatment Note     Name: Rhoda Daly  Clinic Number: [id number]    Therapy Diagnosis:   Encounter Diagnosis   Name Primary?    Chronic pain of right knee      Physician: Gissell Cheema MD    Visit Date: 11/27/2018   Physician: Gissell Cheema MD     Physician Orders: PT Eval and Treat    Medical Diagnosis:  M25.561 (ICD-10-CM) - Knee pain, right anterior  Date of Surgery:na   Evaluation Date: 11/20/2018  Authorization Period Expiration: 12/31/18  Plan of Care Certification Period: 2/12/18  Visit # / Visits authorized: 2/ 20     Time In: 700am  Time Out: 800am   Total Billable Time: 60 minutes    Precautions: Standard    Subjective      Pt reports: she was compliant with home exercise program given last session.   Response to previous treatment:no change    Functional change: no change     Pain: 0/10  Location: right knee      Objective     Aristides received therapeutic exercises to develop strength, endurance, ROM and flexibility for 45 minutes including:  Bike 10 min 4.0 resist for endurance training- mild soreness with bike   Lateral band walking 50'x2   Hip hike 3x15 bilateral   Shuttle leg press DL 4 bands, SL 2 bandx 20x each   SL bridge for 10 sec x5 bilateral   Lower body stretching-7 min             Aristides participated in neuromuscular re-education activities to improve: Balance and Proprioception for 10 minutes. The following activities were included:    SL balance-static 30 sec x 3 dynamic 30x x 3 ball toss           Home Exercises Provided and Patient Education Provided     Education provided:   - no         Assessment     Pt tolerated session well.  Noted knee flexion restriction at end range, also strength deficits were very noticeable with medium resistance.  Pt also has chronic hamstring tightness bilateral that she had been advise to stretch on her own.      Aristides is progressing well towards her goals.   Pt prognosis is Excellent.     Pt will continue to  benefit from skilled outpatient physical therapy to address the deficits listed in the problem list box on initial evaluation, provide pt/family education and to maximize pt's level of independence in the home and community environment.     Pt's spiritual, cultural and educational needs considered and pt agreeable to plan of care and goals.    Anticipated barriers to physical therapy: none        Goals:   Short Term GOALS:  In 4-8 weeks, pt. will:    1. Decrease overall pain to 0-2/10 in the right knee,  on average with daily activities   2. Increase strength to the right knee to 5/5 grossly throughout,  in order to perform ADLs and IADLs more effectively   3. Improve FOTO intake score to 85 to demonstrate improvement with functional mobility and use  4. Independent with HEP  Long Term GOALS:  In 8-12 weeks, pt. will  1. Improve FOTO intake score to 95 to demonstrate improvement with functional mobility and use  2. Independent with HEP          Plan     Continue physical therapy as planned.     Michael Muniz, PT

## 2018-11-29 ENCOUNTER — TELEPHONE (OUTPATIENT)
Dept: OBSTETRICS AND GYNECOLOGY | Facility: CLINIC | Age: 53
End: 2018-11-29

## 2018-11-29 NOTE — TELEPHONE ENCOUNTER
----- Message from Lisa Amaya sent at 11/29/2018  9:57 AM CST -----  Contact: pt            Name of Who is Calling: LAURA JIM [[id number]]      What is the request in detail: pt would like to speak with a nurse in regards to getting a sooner appt for an annual.. Please advise    Can the clinic reply by [facility]: no    What Number to [facility] Back [facility] not [facility]: [phone]

## 2018-11-29 NOTE — TELEPHONE ENCOUNTER
----- Message from Lisa Amaya sent at 11/29/2018  9:57 AM CST -----  Contact: pt            Name of Who is Calling: LAURA JIM [[id number]]      What is the request in detail: pt would like to speak with a nurse in regards to getting a sooner appt for an annual.. Please advise    Can the clinic reply by [facility]: no    What [facility] to [facility] Back [facility]: [phone]

## 2018-11-29 NOTE — TELEPHONE ENCOUNTER
Returned the patient's call to the clinic. Patient requesting an appointment for her annual exam. Patient informed that Dr. Oliva is completely booked for December and that she will be added to the cancellation list and reminder to schedule in January when the schedule is released. Patient agreed and verbalized understanding.

## 2018-11-30 ENCOUNTER — CLINICAL SUPPORT (OUTPATIENT)
Dept: REHABILITATION | Facility: HOSPITAL | Age: 53
End: 2018-11-30
Attending: ORTHOPAEDIC SURGERY
Payer: COMMERCIAL

## 2018-11-30 DIAGNOSIS — M25.561 CHRONIC PAIN OF RIGHT KNEE: ICD-10-CM

## 2018-11-30 DIAGNOSIS — G89.29 CHRONIC PAIN OF RIGHT KNEE: ICD-10-CM

## 2018-11-30 PROCEDURE — 97140 MANUAL THERAPY 1/> REGIONS: CPT

## 2018-11-30 PROCEDURE — 97110 THERAPEUTIC EXERCISES: CPT

## 2018-11-30 NOTE — PROGRESS NOTES
Physical Therapy Daily Treatment Note     Name: Rhoda Daly  Clinic Number: [id number]    Therapy Diagnosis:   No diagnosis found.  Physician: Gissell Cheema MD    Visit Date: 11/30/2018   Physician: Gissell Cheema MD     Physician Orders: PT Eval and Treat    Medical Diagnosis:  M25.561 (ICD-10-CM) - Knee pain, right anterior  Date of Surgery:na   Evaluation Date: 11/20/2018  Authorization Period Expiration: 12/31/18  Plan of Care Certification Period: 2/12/18  Visit # / Visits authorized: 3/ 20     Time In: 0800  Time Out: 0900  Total Billable Time: 30 minutes    Precautions: Standard    Subjective    Pt states feeling well w/ no c/o pn in R knee.    Pt reports: she was compliant with home exercise program given last session.   Response to previous treatment:no change    Functional change: no change     Pain: 0/10  Location: right knee      Objective     Aristides received therapeutic exercises to develop strength, endurance, ROM and flexibility for 20 minutes including:    Bike 5 min 4.0 resist for endurance training- mild soreness with bike   GSS 2 min strap   HSS 2 min plinthe   Lateral band walking 50'x2   Hip hike 3x15 bilateral   Shuttle leg press DL 4 bands, SL 2 bandx 20x each   SL bridge for 10 sec x5 bilateral   Planks 3 x 30 sec     Foofe participated in neuromuscular re-education activities to improve: Balance and Proprioception for 10 minutes. The following activities were included:  SL balance-static 30 sec x 3 dynamic 30x x 3 ball toss   B[facility] walk over 2 x 20     Cp x 10 min to R knee.      Home Exercises Provided and Patient Education Provided     Education provided:   - Pt edu on proper exercise technique.      Assessment   Pt demonstrated improved strength and endurance during therex.  Pt cont to lack some core stability and quad strength.  Pt had no adverse effects from tx.      Bernardinoofolivia is progressing well towards her goals.   Pt prognosis is Excellent.     Pt will continue to  benefit from skilled outpatient physical therapy to address the deficits listed in the problem list box on initial evaluation, provide pt/family education and to maximize pt's level of independence in the home and community environment.     Pt's spiritual, cultural and educational needs considered and pt agreeable to plan of care and goals.    Anticipated barriers to physical therapy: none        Goals:   Short Term GOALS:  In 4-8 weeks, pt. will:    1. Decrease overall pain to 0-2/10 in the right knee,  on average with daily activities   2. Increase strength to the right knee to 5/5 grossly throughout,  in order to perform ADLs and IADLs more effectively   3. Improve FOTO intake score to 85 to demonstrate improvement with functional mobility and use  4. Independent with HEP  Long Term GOALS:  In 8-12 weeks, pt. will  1. Improve FOTO intake score to 95 to demonstrate improvement with functional mobility and use  2. Independent with HEP          Plan     Cont to progress towards goals set by PT.  Work to increase core and quad strength.      Ad Ndiaye, PTA

## 2018-12-04 ENCOUNTER — CLINICAL SUPPORT (OUTPATIENT)
Dept: REHABILITATION | Facility: HOSPITAL | Age: 53
End: 2018-12-04
Attending: ORTHOPAEDIC SURGERY
Payer: COMMERCIAL

## 2018-12-04 DIAGNOSIS — G89.29 CHRONIC PAIN OF RIGHT KNEE: ICD-10-CM

## 2018-12-04 DIAGNOSIS — M25.561 CHRONIC PAIN OF RIGHT KNEE: ICD-10-CM

## 2018-12-04 PROCEDURE — 97110 THERAPEUTIC EXERCISES: CPT

## 2018-12-04 NOTE — PROGRESS NOTES
Physical Therapy Daily Treatment Note     Name: Rhoda Daly  Clinic Number: [id number]    Therapy Diagnosis:   Encounter Diagnosis   Name Primary?    Chronic pain of right knee      Physician: Gissell Cheema MD    Visit Date: 12/4/2018   Physician: Gissell Cheema MD     Physician Orders: PT Eval and Treat    Medical Diagnosis:  M25.561 (ICD-10-CM) - Knee pain, right anterior  Date of Surgery:na   Evaluation Date: 11/20/2018  Authorization Period Expiration: 12/31/18  Plan of Care Certification Period: 2/12/18  Visit # / Visits authorized: 4/ 20     Time In: 0800  Time Out: 0900  Total Billable Time: 55 minutes    Precautions: Standard    Subjective    Pt reports w/ no c/o pn in R knee currently but has had intermittent pn since last vissit.    Pt reports: she was compliant with home exercise program given last session.   Response to previous treatment:no adverse effects   Functional change: no change     Pain: 0/10  Location: right knee      Objective     Aristides received therapeutic exercises to develop strength, endurance, ROM and flexibility for 55 minutes including:    Bike 5 min 4.0 resist for endurance training- mild soreness with bike   GSS 2 min strap   HSS 2 min plinthe   IT band stretch w/ strap 3 x 30 sec   Lateral band walking 2 laps w/ orange   Hip hike 3x15 bilateral   Shuttle leg press DL 4 bands, SL 2 bandx 20x each (stopped 2* increased anterior knee pn)   SLR 3 x 10   SL bridge for 10 sec x5 bilateral   Planks 3 x 30 sec     Aristides participated in neuromuscular re-education activities to improve: Balance and Proprioception for 5 minutes. The following activities were included:  SL balance-static 30 sec x 3 dynamic 30x x 3 ball toss  (Pt given VCs to avoid hyperextending the knee)     Cp x 0 min to R knee.      Home Exercises Provided and Patient Education Provided     Education provided:   - Pt edu on proper exercise technique.    - Pt given HEP and demonstrated full  understanding of all exercises.     Assessment     Pt annel tx w/ no increase in pn.  Pt showed improvements increased hip strength and endurance during therex.  Pt cont to lack some strength.  Pt limited by pn today.    Aristides is progressing well towards her goals.   Pt prognosis is Excellent.     Pt will continue to benefit from skilled outpatient physical therapy to address the deficits listed in the problem list box on initial evaluation, provide pt/family education and to maximize pt's level of independence in the home and community environment.     Pt's spiritual, cultural and educational needs considered and pt agreeable to plan of care and goals.    Anticipated barriers to physical therapy: none        Goals:   Short Term GOALS:  In 4-8 weeks, pt. will:    1. Decrease overall pain to 0-2/10 in the right knee,  on average with daily activities   2. Increase strength to the right knee to 5/5 grossly throughout,  in order to perform ADLs and IADLs more effectively   3. Improve FOTO intake score to 85 to demonstrate improvement with functional mobility and use  4. Independent with HEP  Long Term GOALS:  In 8-12 weeks, pt. will  1. Improve FOTO intake score to 95 to demonstrate improvement with functional mobility and use  2. Independent with HEP          Plan     Cont to progress towards goals set by PT.  Cont to improve core and quad strength.      Ad Ndiaye, PTA

## 2018-12-07 ENCOUNTER — TELEPHONE (OUTPATIENT)
Dept: OBSTETRICS AND GYNECOLOGY | Facility: CLINIC | Age: 53
End: 2018-12-07

## 2018-12-07 NOTE — TELEPHONE ENCOUNTER
Attempted to contact the patient to schedule wwe. No answer, left VM message for the patient to call the clinic back.

## 2018-12-14 ENCOUNTER — CLINICAL SUPPORT (OUTPATIENT)
Dept: REHABILITATION | Facility: HOSPITAL | Age: 53
End: 2018-12-14
Attending: ORTHOPAEDIC SURGERY
Payer: COMMERCIAL

## 2018-12-14 ENCOUNTER — TELEPHONE (OUTPATIENT)
Dept: OBSTETRICS AND GYNECOLOGY | Facility: CLINIC | Age: 53
End: 2018-12-14

## 2018-12-14 DIAGNOSIS — M25.561 CHRONIC PAIN OF RIGHT KNEE: ICD-10-CM

## 2018-12-14 DIAGNOSIS — G89.29 CHRONIC PAIN OF RIGHT KNEE: ICD-10-CM

## 2018-12-14 PROCEDURE — 97110 THERAPEUTIC EXERCISES: CPT

## 2018-12-14 NOTE — TELEPHONE ENCOUNTER
Returned the patient's calls to the clinic. Patient agreed to an appointment on 1/17 at 9:30AM and added to the wait list for a sooner appointment. Patient verbalized understanding.

## 2018-12-14 NOTE — PROGRESS NOTES
"                            Physical Therapy Daily Treatment Note     Name: Rhoda Daly  Clinic Number: [id number]    Therapy Diagnosis:   Encounter Diagnosis   Name Primary?    Chronic pain of right knee      Physician: Gissell Cheema MD    Visit Date: 12/14/2018   Physician: Gissell Cheema MD     Physician Orders: PT Eval and Treat    Medical Diagnosis:  M25.561 (ICD-10-CM) - Knee pain, right anterior  Date of Surgery:na   Evaluation Date: 11/20/2018  Authorization Period Expiration: 12/31/18  Plan of Care Certification Period: 2/12/18  Visit # / Visits authorized: 5/ 20     Time In: 0705  Time Out: 0756   Total Billable Time: 25 minutes    Precautions: Standard    Subjective    Pt states feeling well w/ no c/o pn in R knee.      Pt reports: she was compliant with home exercise program given last session.   Response to previous treatment:no adverse effects   Functional change: no change     Pain: 0/10  Location: right knee      Objective   FOTO: 12/14/18  38 % limitation     Fokatarzyna received therapeutic exercises to develop strength, endurance, ROM and flexibility for 25 minutes including:    Bike 5 min 4.0 resist for endurance training- mild soreness with bike   GSS 2 min strap   HSS 2 min plinthe   IT band stretch w/ strap 3 x 30 sec   Lateral band walking 1 laps w/ green    Hip hike 30 x  bilateral   Shuttle leg press DL 4 bands 30 x ( slightly increased pn)   SLR 30 x 3 sec hold   Step up 3" 3 x 10   LAQ 30 x 2# ( no pn)   LLR 30 x   SL bridge for 10 sec x5 bilateral   Planks 3 x 40 sec     Not performed today  Foofe participated in neuromuscular re-education activities to improve: Balance and Proprioception for 0 minutes. The following activities were included:  SL balance-static 30 sec x 3 dynamic 30x x 3 ball toss  (Pt given VCs to avoid hyperextending the knee)     Cp x 0 min to R knee.      Home Exercises Provided and Patient Education Provided     Education provided:   - Pt edu on proper exercise technique.  "   - Pt given HEP and demonstrated full understanding of all exercises.     Assessment   Pt displayed increased...during therex.  Pt cont to have some ant knee pn with closed chain exercises.  Pt lacks some quad and hip strength.  Pt had no adverse effects from tx today.      Aristides is progressing well towards her goals.   Pt prognosis is Excellent.     Pt will continue to benefit from skilled outpatient physical therapy to address the deficits listed in the problem list box on initial evaluation, provide pt/family education and to maximize pt's level of independence in the home and community environment.     Pt's spiritual, cultural and educational needs considered and pt agreeable to plan of care and goals.    Anticipated barriers to physical therapy: none        Goals:   Short Term GOALS:  In 4-8 weeks, pt. will:    1. Decrease overall pain to 0-2/10 in the right knee,  on average with daily activities   2. Increase strength to the right knee to 5/5 grossly throughout,  in order to perform ADLs and IADLs more effectively   3. Improve FOTO intake score to 85 to demonstrate improvement with functional mobility and use  4. Independent with HEP  Long Term GOALS:  In 8-12 weeks, pt. will  1. Improve FOTO intake score to 95 to demonstrate improvement with functional mobility and use  2. Independent with HEP          Plan     Cont to progress towards goals set by PT.  Cont to improve core and quad strength.      Ad Ndiaye, PTA

## 2018-12-14 NOTE — TELEPHONE ENCOUNTER
----- Message from Lisa Amaya sent at 12/14/2018  2:00 PM CST -----  Contact: pt            Name of Who is Calling: LAURA JIM [[id number]]      What is the request in detail: pt needs to be seen by Dr edgar hunt.. Please advise      Can the clinic reply by [facility]:yes      What Number to Call Back if not in [facility]: [phone]

## 2018-12-18 ENCOUNTER — CLINICAL SUPPORT (OUTPATIENT)
Dept: REHABILITATION | Facility: HOSPITAL | Age: 53
End: 2018-12-18
Attending: ORTHOPAEDIC SURGERY
Payer: COMMERCIAL

## 2018-12-18 DIAGNOSIS — M25.561 CHRONIC PAIN OF RIGHT KNEE: ICD-10-CM

## 2018-12-18 DIAGNOSIS — G89.29 CHRONIC PAIN OF RIGHT KNEE: ICD-10-CM

## 2018-12-18 PROCEDURE — 97110 THERAPEUTIC EXERCISES: CPT

## 2018-12-18 NOTE — PROGRESS NOTES
"                            Physical Therapy Daily Treatment Note     Name: Rhoda Daly  Clinic Number: [id number]    Therapy Diagnosis:   Encounter Diagnosis   Name Primary?    Chronic pain of right knee      Physician: Gissell Cheema MD    Visit Date: 12/18/2018   Physician: Gissell Cheema MD     Physician Orders: PT Eval and Treat    Medical Diagnosis:  M25.561 (ICD-10-CM) - Knee pain, right anterior  Date of Surgery:na   Evaluation Date: 11/20/2018  Authorization Period Expiration: 12/31/18  Plan of Care Certification Period: 2/12/18  Visit # / Visits authorized: 5/ 20     Time In: 0705  Time Out: 0756   Total Billable Time: 25 minutes    Precautions: Standard    Subjective    Pt states feeling well w/ no c/o pn in R knee.      Pt reports: she was compliant with home exercise program given last session.   Response to previous treatment:no adverse effects   Functional change: no change     Pain: 0/10  Location: right knee      Objective   FOTO: 12/14/18  38 % limitation     Foofe received therapeutic exercises to develop strength, endurance, ROM and flexibility for 25 minutes including:    Bike 10 min 4.0 resist for endurance training- mild soreness with bike     to R knee.    SL bridge with 20 sec holds 5x bilateral   SL balance with knee bend to 30 degrees 5x45 sec bilaterally   Hip hikes on foam 3x25   Static squat with band around knees 5x45 sec on biodex   Static squat with Paloff press 2x15   Shuttle isometric 4 bands 5x30 sec     Home Exercises Provided and Patient Education Provided     Education provided:   - Pt edu on proper exercise technique.    - Pt given HEP and demonstrated full understanding of all exercises.     Assessment   Pt tolerated session well.  Pt had concerns with ADLs and IADLs functional mobility such as squatting.  Pt was educated on appropriate squat and this did result in less right knee pain.  We did focus on isometric exercises today that were rated as "hard" by the pt.  She was " also advised to perform the static isometric squat at home daily.  I will speak with PTA to inform him of isometric bias exercises.  Pt does have some right ankle stability/weakness that also may be associated with increased right knee valgus in the SL stance position.        Aristides is progressing well towards her goals.   Pt prognosis is Excellent.     Pt will continue to benefit from skilled outpatient physical therapy to address the deficits listed in the problem list box on initial evaluation, provide pt/family education and to maximize pt's level of independence in the home and community environment.     Pt's spiritual, cultural and educational needs considered and pt agreeable to plan of care and goals.    Anticipated barriers to physical therapy: none        Goals:   Short Term GOALS:  In 4-8 weeks, pt. will:    1. Decrease overall pain to 0-2/10 in the right knee,  on average with daily activities   2. Increase strength to the right knee to 5/5 grossly throughout,  in order to perform ADLs and IADLs more effectively   3. Improve FOTO intake score to 85 to demonstrate improvement with functional mobility and use  4. Independent with HEP  Long Term GOALS:  In 8-12 weeks, pt. will  1. Improve FOTO intake score to 95 to demonstrate improvement with functional mobility and use  2. Independent with HEP          Plan     Cont to progress towards goals set by PT.  Cont to improve core and quad strength.      Michael Muniz, PT

## 2018-12-21 ENCOUNTER — CLINICAL SUPPORT (OUTPATIENT)
Dept: REHABILITATION | Facility: HOSPITAL | Age: 53
End: 2018-12-21
Attending: ORTHOPAEDIC SURGERY
Payer: COMMERCIAL

## 2018-12-21 DIAGNOSIS — G89.29 CHRONIC PAIN OF RIGHT KNEE: ICD-10-CM

## 2018-12-21 DIAGNOSIS — M25.561 CHRONIC PAIN OF RIGHT KNEE: ICD-10-CM

## 2018-12-21 PROCEDURE — 97110 THERAPEUTIC EXERCISES: CPT

## 2018-12-26 ENCOUNTER — CLINICAL SUPPORT (OUTPATIENT)
Dept: REHABILITATION | Facility: HOSPITAL | Age: 53
End: 2018-12-26
Attending: ORTHOPAEDIC SURGERY
Payer: COMMERCIAL

## 2018-12-26 DIAGNOSIS — M25.561 CHRONIC PAIN OF RIGHT KNEE: ICD-10-CM

## 2018-12-26 DIAGNOSIS — G89.29 CHRONIC PAIN OF RIGHT KNEE: ICD-10-CM

## 2018-12-26 PROCEDURE — 97110 THERAPEUTIC EXERCISES: CPT

## 2018-12-26 NOTE — PROGRESS NOTES
Physical Therapy Daily Treatment Note     Name: Rhoda Daly  Clinic Number: [id number]    Therapy Diagnosis:   Encounter Diagnosis   Name Primary?    Chronic pain of right knee      Physician: Gissell Cheema MD    Visit Date: 12/21/2018   Physician: Gissell Cheema MD     Physician Orders: PT Eval and Treat    Medical Diagnosis:  M25.561 (ICD-10-CM) - Knee pain, right anterior  Date of Surgery:na   Evaluation Date: 11/20/2018  Authorization Period Expiration: 12/31/18  Plan of Care Certification Period: 2/12/18  Visit # / Visits authorized: 6/ 20     Time In: 400pm  Time Out: 500pm    Total Billable Time: 25 minutes    Precautions: Standard    Subjective    Pt states feeling well w/ no c/o pn in R knee.      Pt reports: she was compliant with home exercise program given last session.   Response to previous treatment:no adverse effects   Functional change: no change     Pain: 0/10  Location: right knee      Objective   FOTO: 12/14/18  38 % limitation     Foofe received therapeutic exercises to develop strength, endurance, ROM and flexibility for 25 minutes including:    Bike 5 min 4.0 resist for endurance training- mild soreness with bike     to R knee.    SL bridge with 20 sec holds 5x bilateral   SL balance with knee bend to 30 degrees 5x45 sec bilaterally   Static squat with band around knees 5x45 sec on biodex   Shuttle isometric 4 bands 5x30 sec     Home Exercises Provided and Patient Education Provided     Education provided:   - Pt edu on proper exercise technique.    - Pt given HEP and demonstrated full understanding of all exercises.     Assessment   Pt tolerated session well.  Pt had limited time for treatment session.   We continued with strengthening for IADL function.  Pt continues to note inconsistent knee pain, though it does follow a normal OA type appearance.  Continued with isometrics for LE strengthening, motor control, in pain free ranges.        Foofe is progressing well  towards her goals.   Pt prognosis is Excellent.     Pt will continue to benefit from skilled outpatient physical therapy to address the deficits listed in the problem list box on initial evaluation, provide pt/family education and to maximize pt's level of independence in the home and community environment.     Pt's spiritual, cultural and educational needs considered and pt agreeable to plan of care and goals.    Anticipated barriers to physical therapy: none        Goals:   Short Term GOALS:  In 4-8 weeks, pt. will:    1. Decrease overall pain to 0-2/10 in the right knee,  on average with daily activities   2. Increase strength to the right knee to 5/5 grossly throughout,  in order to perform ADLs and IADLs more effectively   3. Improve FOTO intake score to 85 to demonstrate improvement with functional mobility and use  4. Independent with HEP  Long Term GOALS:  In 8-12 weeks, pt. will  1. Improve FOTO intake score to 95 to demonstrate improvement with functional mobility and use  2. Independent with HEP          Plan     Cont to progress towards goals set by PT.  Cont to improve core and quad strength.      Michael Muniz, PT

## 2018-12-26 NOTE — PROGRESS NOTES
Physical Therapy Daily Treatment Note     Name: Rhoda Daly  Clinic Number: [id number]    Therapy Diagnosis:   Encounter Diagnosis   Name Primary?    Chronic pain of right knee      Physician: Gissell Cheema MD    Visit Date: 12/26/2018   Physician: Gissell Cheema MD     Physician Orders: PT Eval and Treat    Medical Diagnosis:  M25.561 (ICD-10-CM) - Knee pain, right anterior  Date of Surgery:na   Evaluation Date: 11/20/2018  Authorization Period Expiration: 12/31/18  Plan of Care Certification Period: 2/12/18  Visit # / Visits authorized: 6/ 20     Time In: 401  Time Out: 446  Total Billable Time: 45 minutes    Precautions: Standard    Subjective    Pt states no significant change she has most difficulty getting up and down from crouch or off the floor.     Pt reports: she was compliant with home exercise program given last session.   Response to previous treatment:no adverse effects   Functional change: no change     Pain: 0/10  Location: right knee      Objective   FOTO: 12/14/18  38 % limitation     Aristides received therapeutic exercises to develop strength, endurance, ROM and flexibility for 40 minutes including:    Bike 10 min 4.0 resist for endurance training- mild soreness with bike     to R knee.    DLE bridges 3x10  SL bridge with 20 sec holds 5x bilateral   SL balance with knee bend to 30 degrees 5x45 sec bilaterally   Hip hikes on foam 3x25   Static squat with band around knees 5x45 sec on biodex   Static squat with Paloff press 2x15 -NP  Shuttle isometric 4 bands 5x30 sec      Home Exercises Provided and Patient Education Provided     Education provided:   - Pt edu on proper exercise technique.    - Pt given HEP and demonstrated full understanding of all exercises.     Assessment   Pt shows good tolerance with therex with VC for form. She showed no increase in symptoms with therex. She reported improvement in symptoms post session. She had no adverse effects to treatment.   Aristides  is progressing well towards her goals.   Pt prognosis is Excellent.     Pt will continue to benefit from skilled outpatient physical therapy to address the deficits listed in the problem list box on initial evaluation, provide pt/family education and to maximize pt's level of independence in the home and community environment.     Pt's spiritual, cultural and educational needs considered and pt agreeable to plan of care and goals.    Anticipated barriers to physical therapy: none        Goals:   Short Term GOALS:  In 4-8 weeks, pt. will:    1. Decrease overall pain to 0-2/10 in the right knee,  on average with daily activities   2. Increase strength to the right knee to 5/5 grossly throughout,  in order to perform ADLs and IADLs more effectively   3. Improve FOTO intake score to 85 to demonstrate improvement with functional mobility and use  4. Independent with HEP  Long Term GOALS:  In 8-12 weeks, pt. will  1. Improve FOTO intake score to 95 to demonstrate improvement with functional mobility and use  2. Independent with HEP          Plan     Cont to progress towards goals set by PT.  Cont to improve core and quad strength.      Carlos Aguila, PT DPT

## 2018-12-27 NOTE — PROGRESS NOTES
Last 5 Patient Entered Readings                                      Current 30 Day Average: 104/69     Recent Readings 12/27/2018 12/13/2018 12/9/2018 12/3/2018 11/30/2018    SBP (mmHg) 90 113 92 116 108    DBP (mmHg) 69 72 63 73 68    Pulse 86 75 85 72 68      Chart reviewed and BP remains at goal. Will continue her current BP medications at his time.

## 2018-12-31 ENCOUNTER — CLINICAL SUPPORT (OUTPATIENT)
Dept: REHABILITATION | Facility: HOSPITAL | Age: 53
End: 2018-12-31
Attending: ORTHOPAEDIC SURGERY
Payer: COMMERCIAL

## 2018-12-31 DIAGNOSIS — G89.29 CHRONIC PAIN OF RIGHT KNEE: ICD-10-CM

## 2018-12-31 DIAGNOSIS — M25.561 CHRONIC PAIN OF RIGHT KNEE: ICD-10-CM

## 2018-12-31 PROCEDURE — 97110 THERAPEUTIC EXERCISES: CPT

## 2019-01-02 ENCOUNTER — TELEPHONE (OUTPATIENT)
Dept: SPORTS MEDICINE | Facility: CLINIC | Age: 54
End: 2019-01-02

## 2019-01-02 NOTE — TELEPHONE ENCOUNTER
Tried to call patient about her new insurance today. No answer on the number that was left no voicemail as well.

## 2019-01-02 NOTE — TELEPHONE ENCOUNTER
----- Message from Eufemia Cano MA sent at 1/2/2019 10:26 AM CST -----  Contact: Self  Please let patient know that I sent a message to our pre service department about getting her therapy authorized.     Eufemia Cano   Clinical assistant to Dr. Gissell Cheema    ----- Message -----  From: Alyssa Matthews MA  Sent: 1/2/2019  10:15 AM  To: Eufemia Cano MA    See below    ----- Message -----  From: Patricia Allen  Sent: 1/2/2019  10:09 AM  To: Anette Borrero Staff    Patient has new insurance as of the new year and needs her physical therapy authorized through [employer].    Her next PT appt is Friday.    She can be reached with questions at [phone]

## 2019-01-02 NOTE — PROGRESS NOTES
Physical Therapy Daily Treatment Note     Name: Rhoda Daly  Clinic Number: [id number]    Therapy Diagnosis:   Encounter Diagnosis   Name Primary?    Chronic pain of right knee      Physician: Gissell Cheema MD    Visit Date: 12/31/2018   Physician: Gissell Cheema MD     Physician Orders: PT Eval and Treat    Medical Diagnosis:  M25.561 (ICD-10-CM) - Knee pain, right anterior  Date of Surgery:na   Evaluation Date: 11/20/2018  Authorization Period Expiration: 12/31/18  Plan of Care Certification Period: 2/12/18  Visit # / Visits authorized: 6/ 20     Time In: 1015  Time Out: 1100  Total Billable Time: 45 minutes    Precautions: Standard    Subjective    Pt states no significant change she has most difficulty getting up and down from crouch or off the floor.      Pt reports: she was compliant with home exercise program given last session.   Response to previous treatment:no adverse effects   Functional change: no change     Pain: 0/10  Location: right knee      Objective   FOTO: 12/14/18  38 % limitation     Foofe received therapeutic exercises to develop strength, endurance, ROM and flexibility for 45 minutes including:    Bike 10 min 4.0 resist for endurance training- mild soreness with bike     to R knee.    DLE bridges 3x10  SL bridge with 20 sec holds 5x bilateral   SL balance with knee bend to 30 degrees 5x45 sec bilaterally   Hip hikes on foam 3x25   Static squat with band around knees 5x45 sec on biodex   Modified side plank 10x10 sec holds bilateral  Resisted retro walking w/ cook band 40'x4  Shuttle isometric 4 bands 5x30 sec      Home Exercises Provided and Patient Education Provided     Education provided:   - Pt edu on proper exercise technique.    - Pt given HEP and demonstrated full understanding of all exercises.     Assessment   Pt shows good tolerance with therex and exercise progression.  Right LE continues to demonstrate an increase in valgus with SL activities.  I have  suggested that she continued to work on the SL stance while at home and she agreed.  Also continued to work on quad strengthening as she did have a noticeable weakness/fatigue with the right quad with exercises today. I will measure with HH dynamometry on next visit.  Pt will need to be consistent with her HEP for her symptoms to remain low and this is something that she struggled with in the past.    Aristides is progressing well towards her goals.   Pt prognosis is Excellent.     Pt will continue to benefit from skilled outpatient physical therapy to address the deficits listed in the problem list box on initial evaluation, provide pt/family education and to maximize pt's level of independence in the home and community environment.     Pt's spiritual, cultural and educational needs considered and pt agreeable to plan of care and goals.    Anticipated barriers to physical therapy: none        Goals:   Short Term GOALS:  In 4-8 weeks, pt. will:    1. Decrease overall pain to 0-2/10 in the right knee,  on average with daily activities   2. Increase strength to the right knee to 5/5 grossly throughout,  in order to perform ADLs and IADLs more effectively   3. Improve FOTO intake score to 85 to demonstrate improvement with functional mobility and use  4. Independent with HEP  Long Term GOALS:  In 8-12 weeks, pt. will  1. Improve FOTO intake score to 95 to demonstrate improvement with functional mobility and use  2. Independent with HEP          Plan     Cont to progress towards goals set by PT.  Cont to improve core and quad strength.      Michael Muniz, PT DPT

## 2019-01-04 ENCOUNTER — TELEPHONE (OUTPATIENT)
Dept: SPORTS MEDICINE | Facility: CLINIC | Age: 54
End: 2019-01-04

## 2019-01-04 NOTE — TELEPHONE ENCOUNTER
I called and informed the patient that her PT has been authorized through her insurance company. She inquired about out of pocket payment, she was provided the phone number to the billing department for inquiries.

## 2019-01-04 NOTE — TELEPHONE ENCOUNTER
----- Message from Xochilt Reed sent at 1/4/2019 11:32 AM CST -----  Contact: Self/ [phone]  Patient would like a call back to speak with Eufemia about her physical therapy.

## 2019-01-08 ENCOUNTER — CLINICAL SUPPORT (OUTPATIENT)
Dept: REHABILITATION | Facility: HOSPITAL | Age: 54
End: 2019-01-08
Attending: ORTHOPAEDIC SURGERY
Payer: COMMERCIAL

## 2019-01-08 DIAGNOSIS — G89.29 CHRONIC PAIN OF RIGHT KNEE: ICD-10-CM

## 2019-01-08 DIAGNOSIS — M25.561 CHRONIC PAIN OF RIGHT KNEE: ICD-10-CM

## 2019-01-08 PROCEDURE — 97110 THERAPEUTIC EXERCISES: CPT

## 2019-01-09 NOTE — PROGRESS NOTES
Physical Therapy Daily Treatment Note     Name: Rhoda Daly  Clinic Number: [id number]    Therapy Diagnosis:   Encounter Diagnosis   Name Primary?    Chronic pain of right knee      Physician: Gissell Cheema MD    Visit Date: 1/8/2019   Physician: Gissell Cheema MD     Physician Orders: PT Eval and Treat    Medical Diagnosis:  M25.561 (ICD-10-CM) - Knee pain, right anterior  Date of Surgery:na   Evaluation Date: 11/20/2018  Authorization Period Expiration: 12/31/18  Plan of Care Certification Period: 2/12/18  Visit # / Visits authorized: 7/ 20     Time In: 1015  Time Out: 1100  Total Billable Time: 45 minutes    Precautions: Standard    Subjective    Pt states no significant change she has most difficulty getting up and down from crouch or off the floor.      Pt reports: she was compliant with home exercise program given last session.   Response to previous treatment:no adverse effects   Functional change: no change     Pain: 0/10  Location: right knee      Objective   FOTO: 12/14/18  38 % limitation     Foofe received therapeutic exercises to develop strength, endurance, ROM and flexibility for 45 minutes including:    Bike 10 min 4.0 resist for endurance training- mild soreness with bike     to R knee.    DLE bridges 3x10  SL bridge with 20 sec holds 5x bilateral   SL balance with knee bend to 30 degrees 5x45 sec bilaterally   Hip hikes on foam 3x25   Modified side plank 10x10 sec holds bilateral  Lateral miniband walking, orange elastic band 40'x4  Clam shells 3x15 green small band       Home Exercises Provided and Patient Education Provided     Education provided:   - Pt edu on proper exercise technique.    - Pt given HEP and demonstrated full understanding of all exercises.     Assessment   Pt tolerated therapy session well, however she continues to have some soreness in her knee.  She continues to present with an increased knee valgus in the SL position in the RLE.  She does have  audible grinding that can be heard with flexion and extension.  She has increased her neuromuscular strength which can be seen with her exercise technique and overall her functional mobility has improved.  Pt has alerted me of her insurance changes and would like to get back to us regarding her coverage.    Aristides is progressing well towards her goals.   Pt prognosis is Excellent.     Pt will continue to benefit from skilled outpatient physical therapy to address the deficits listed in the problem list box on initial evaluation, provide pt/family education and to maximize pt's level of independence in the home and community environment.     Pt's spiritual, cultural and educational needs considered and pt agreeable to plan of care and goals.    Anticipated barriers to physical therapy: none        Goals:   Short Term GOALS:  In 4-8 weeks, pt. will:    1. Decrease overall pain to 0-2/10 in the right knee,  on average with daily activities   2. Increase strength to the right knee to 5/5 grossly throughout,  in order to perform ADLs and IADLs more effectively   3. Improve FOTO intake score to 85 to demonstrate improvement with functional mobility and use  4. Independent with HEP  Long Term GOALS:  In 8-12 weeks, pt. will  1. Improve FOTO intake score to 95 to demonstrate improvement with functional mobility and use  2. Independent with HEP          Plan     Cont to progress towards goals set by PT.  Cont to improve core and quad strength.  Pt to call or communicate with [facility] regarding her insurance.    Michael Muniz, PT DPT

## 2019-01-15 ENCOUNTER — CLINICAL SUPPORT (OUTPATIENT)
Dept: REHABILITATION | Facility: HOSPITAL | Age: 54
End: 2019-01-15
Attending: ORTHOPAEDIC SURGERY
Payer: COMMERCIAL

## 2019-01-15 DIAGNOSIS — G89.29 CHRONIC PAIN OF RIGHT KNEE: ICD-10-CM

## 2019-01-15 DIAGNOSIS — M25.561 CHRONIC PAIN OF RIGHT KNEE: ICD-10-CM

## 2019-01-15 PROCEDURE — 97110 THERAPEUTIC EXERCISES: CPT

## 2019-01-17 ENCOUNTER — OFFICE VISIT (OUTPATIENT)
Dept: OBSTETRICS AND GYNECOLOGY | Facility: CLINIC | Age: 54
End: 2019-01-17
Attending: OBSTETRICS & GYNECOLOGY
Payer: COMMERCIAL

## 2019-01-17 VITALS
SYSTOLIC BLOOD PRESSURE: 100 MMHG | HEIGHT: 64 IN | DIASTOLIC BLOOD PRESSURE: 70 MMHG | WEIGHT: 131.63 LBS | BODY MASS INDEX: 22.47 KG/M2

## 2019-01-17 DIAGNOSIS — Z01.419 VISIT FOR GYNECOLOGIC EXAMINATION: Primary | ICD-10-CM

## 2019-01-17 DIAGNOSIS — Z12.31 ENCOUNTER FOR SCREENING MAMMOGRAM FOR BREAST CANCER: ICD-10-CM

## 2019-01-17 PROCEDURE — 88175 CYTOPATH C/V AUTO FLUID REDO: CPT

## 2019-01-17 PROCEDURE — 3074F SYST BP LT 130 MM HG: CPT | Mod: CPTII,S$GLB,, | Performed by: OBSTETRICS & GYNECOLOGY

## 2019-01-17 PROCEDURE — 99396 PR PREVENTIVE VISIT,EST,40-64: ICD-10-PCS | Mod: S$GLB,,, | Performed by: OBSTETRICS & GYNECOLOGY

## 2019-01-17 PROCEDURE — 3078F DIAST BP <80 MM HG: CPT | Mod: CPTII,S$GLB,, | Performed by: OBSTETRICS & GYNECOLOGY

## 2019-01-17 PROCEDURE — 99999 PR PBB SHADOW E&M-EST. PATIENT-LVL III: ICD-10-PCS | Mod: PBBFAC,,, | Performed by: OBSTETRICS & GYNECOLOGY

## 2019-01-17 PROCEDURE — 3078F PR MOST RECENT DIASTOLIC BLOOD PRESSURE < 80 MM HG: ICD-10-PCS | Mod: CPTII,S$GLB,, | Performed by: OBSTETRICS & GYNECOLOGY

## 2019-01-17 PROCEDURE — 99396 PREV VISIT EST AGE 40-64: CPT | Mod: S$GLB,,, | Performed by: OBSTETRICS & GYNECOLOGY

## 2019-01-17 PROCEDURE — 99999 PR PBB SHADOW E&M-EST. PATIENT-LVL III: CPT | Mod: PBBFAC,,, | Performed by: OBSTETRICS & GYNECOLOGY

## 2019-01-17 PROCEDURE — 87624 HPV HI-RISK TYP POOLED RSLT: CPT

## 2019-01-17 PROCEDURE — 3074F PR MOST RECENT SYSTOLIC BLOOD PRESSURE < 130 MM HG: ICD-10-PCS | Mod: CPTII,S$GLB,, | Performed by: OBSTETRICS & GYNECOLOGY

## 2019-01-17 NOTE — PROGRESS NOTES
"CC: Well woman exam    Rhoda Daly is a 53 y.o. female  presents for a well woman exam.  LMP: No LMP recorded..  No issues, problems, or complaints.    Past Medical History:   Diagnosis Date    Diverticulitis     Hypertension      Past Surgical History:   Procedure Laterality Date     SECTION       Social History     Socioeconomic History    Marital status:      Spouse name: None    Number of children: None    Years of education: None    Highest education level: None   Social Needs    Financial resource strain: None    Food insecurity - worry: None    Food insecurity - inability: None    Transportation needs - medical: None    Transportation needs - non-medical: None   Occupational History     Employer: [employer].    Tobacco Use    Smoking status: Never Smoker    Smokeless tobacco: Never Used   Substance and Sexual Activity    Alcohol use: Yes     Comment: daily, beer tonight    Drug use: No    Sexual activity: Yes     Partners: Male     Birth control/protection: None   Other Topics Concern    Are you pregnant or think you may be? Not Asked    Breast-feeding Not Asked   Social History Narrative    , 2 kids, 16dtr and 20 son and dog.    Close to parents and  mother.    Contract work marketing.    Walks on weekends     Family History   Problem Relation Age of Onset    Hypertension Mother     Peripheral vascular disease Mother         renal artery stenosis    Skin cancer Mother     Hyperlipidemia Father     Heart disease Father         cad    Melanoma Father     Skin cancer Sister     Breast cancer Neg Hx     Colon cancer Neg Hx     Ovarian cancer Neg Hx     Cancer Neg Hx      OB History      Para Term  AB Living    2 2 0     2    SAB TAB Ectopic Multiple Live Births            2          /70 (BP Location: Left arm, Patient Position: Sitting, BP Method: Small (Manual))   Ht 5' 4" (1.626 m)   Wt 59.7 kg (131 lb 9.8 " oz)   BMI 22.59 kg/m²       ROS:    ROS:  GENERAL: Denies weight gain or weight loss. Feeling well overall.   SKIN: Denies rash or lesions.   HEAD: Denies head injury or headache.   NODES: Denies enlarged lymph nodes.   CHEST: Denies chest pain or shortness of breath.   CARDIOVASCULAR: Denies palpitations or left sided chest pain.   ABDOMEN: No abdominal pain, constipation, diarrhea, nausea, vomiting or rectal bleeding.   URINARY: No frequency, dysuria, hematuria, or burning on urination.  REPRODUCTIVE: See HPI.   BREASTS: The patient performs breast self-examination and denies pain, lumps, or nipple discharge.   HEMATOLOGIC: No easy bruisability or excessive bleeding.   MUSCULOSKELETAL: Denies joint pain or swelling.   NEUROLOGIC: Denies syncope or weakness.   PSYCHIATRIC: Denies depression, anxiety or mood swings.    PHYSICAL EXAM:    APPEARANCE: Well nourished, well developed, in no acute distress.  AFFECT: WNL, alert and oriented x 3  SKIN: No acne or hirsutism  NECK: Neck symmetric without masses or thyromegaly  NODES: No inguinal, cervical, axillary, or femoral lymph node enlargement  CHEST: Good respiratory effect  ABDOMEN: Soft.  No tenderness or masses.  No hepatosplenomegaly.  No hernias.  BREASTS: Symmetrical, no skin changes or visible lesions.  No palpable masses, nipple discharge bilaterally.  PELVIC: Normal external genitalia without lesions.  Normal hair distribution.  Adequate perineal body, normal urethral meatus.  Vagina atrophic without lesions or discharge.  Cervix pink, without lesions, discharge or tenderness.  No significant cystocele or rectocele.  Bimanual exam shows uterus to be normal size, regular, mobile and nontender.  Adnexa without masses or tenderness.    RECTAL: Rectovaginal exam confirms above with normal sphincter tone, no masses.  EXTREMITIES: No edema.      ICD-10-CM ICD-9-CM    1. Visit for gynecologic examination Z01.419 V72.31 Liquid-based pap smear, screening      HPV High  Risk Genotypes, PCR   2. Encounter for screening mammogram for breast cancer Z12.31 V76.12 Mammo Digital Screening Bilat         Patient was counseled today on A.C.S. Pap guidelines and recommendations for yearly pelvic exams, pap smears every 5 years with HPV co-testing, mammograms and monthly self breast exams; to see her PCP for other health maintenance.     Follow-up in about 1 year (around 1/17/2020).

## 2019-01-19 NOTE — PROGRESS NOTES
Physical Therapy Daily Treatment Note     Name: Rhoda Daly  Clinic Number: [id number]    Therapy Diagnosis:   Encounter Diagnosis   Name Primary?    Chronic pain of right knee      Physician: Gissell Cheema MD    Visit Date: 1/15/2019   Physician: Gissell Cheema MD     Physician Orders: PT Eval and Treat    Medical Diagnosis:  M25.561 (ICD-10-CM) - Knee pain, right anterior  Date of Surgery:na   Evaluation Date: 11/20/2018  Authorization Period Expiration: 12/31/18  Plan of Care Certification Period: 2/12/18  Visit # / Visits authorized: 8/ 20     Time In: 405  Time Out: 500  Total Billable Time: 45 minutes    Precautions: Standard    Subjective    Pt states no changes from last visit.      Pt reports: she was compliant with home exercise program given last session.   Response to previous treatment:no adverse effects   Functional change: no change     Pain: 0/10  Location: right knee      Objective   FOTO: 12/14/18  38 % limitation     Foofe received therapeutic exercises to develop strength, endurance, ROM and flexibility for 45 minutes including:    Bike 10 min 4.0 resist for endurance training- mild soreness with bike     to R knee.    DLE bridges 3x10  SL bridge with 20 sec holds 5x bilateral   SL balance with knee bend to 30 degrees 5x45 sec bilaterally   Hip hikes on foam 3x25   Modified side plank 10x10 sec holds bilateral  Lateral miniband walking, orange elastic band 40'x4  Clam shells 3x15 green small band  Shuttle isometrics with 4 bands SL 45sec x 4       Home Exercises Provided and Patient Education Provided     Education provided:   - Pt edu on proper exercise technique.    - Pt given HEP and demonstrated full understanding of all exercises.     Assessment   Pt tolerated therapy session well, however her progress remains slow at this time.  Though she has noted improved functional mobility, her knee pain remains inconsistent.  I have explained to her that this is typical  based on her diagnosis and that strength and continued exercise is the most beneficial.  Right hip remains weak and she admits to being inconsistent with HEP.  We have agreed that she will perform HEP for two weeks and come back for reassessment at that time.    Aristides is progressing well towards her goals.   Pt prognosis is Excellent.     Pt will continue to benefit from skilled outpatient physical therapy to address the deficits listed in the problem list box on initial evaluation, provide pt/family education and to maximize pt's level of independence in the home and community environment.     Pt's spiritual, cultural and educational needs considered and pt agreeable to plan of care and goals.    Anticipated barriers to physical therapy: none        Goals:   Short Term GOALS:  In 4-8 weeks, pt. will:    1. Decrease overall pain to 0-2/10 in the right knee,  on average with daily activities   2. Increase strength to the right knee to 5/5 grossly throughout,  in order to perform ADLs and IADLs more effectively   3. Improve FOTO intake score to 85 to demonstrate improvement with functional mobility and use  4. Independent with HEP  Long Term GOALS:  In 8-12 weeks, pt. will  1. Improve FOTO intake score to 95 to demonstrate improvement with functional mobility and use  2. Independent with HEP          Plan     Cont to progress towards goals set by PT.  Cont to improve core and quad strength.  Pt to call or communicate with [facility] regarding her insurance.    Michael Muniz, PT DPT

## 2019-01-23 LAB
HPV HR 12 DNA CVX QL NAA+PROBE: NEGATIVE
HPV16 AG SPEC QL: NEGATIVE
HPV18 DNA SPEC QL NAA+PROBE: NEGATIVE

## 2019-02-01 ENCOUNTER — PATIENT OUTREACH (OUTPATIENT)
Dept: OTHER | Facility: OTHER | Age: 54
End: 2019-02-01

## 2019-02-01 NOTE — PROGRESS NOTES
Last 5 Patient Entered Readings                                      Current 30 Day Average: 109/72     Recent Readings 1/30/2019 1/17/2019 1/15/2019 1/3/2019 1/2/2019    SBP (mmHg) 96 103 131 108 107    DBP (mmHg) 67 71 79 73 71    Pulse 74 84 71 77 69        2/1- Digital Medicine: Health  Follow Up  Left voicemail to follow up with Hannah Rhoda Malika.  Current BP average 109/72 mmHg is at goal, [<130/80].

## 2019-03-21 ENCOUNTER — PATIENT OUTREACH (OUTPATIENT)
Dept: OTHER | Facility: OTHER | Age: 54
End: 2019-03-21

## 2019-03-21 NOTE — PROGRESS NOTES
Last 5 Patient Entered Readings                                      Current 30 Day Average: 132/84     Recent Readings 3/9/2019 3/5/2019 3/5/2019 3/5/2019 2/24/2019    SBP (mmHg) 128 159 140 147 108    DBP (mmHg) 79 89 83 92 79    Pulse 64 67 67 65 74        HPI:  Called patient to follow up on her slightly elevated BP. Patient's daughter was in the hospital for four nights for pneumonia. This caused an increase in stress and blood pressure.    Patient endorses adherence to medication regimen.     Patient denies hypotensive s/sx (lightheadedness, dizziness, nausea, fatigue); patient denies hypertensive s/sx (SOB, CP, severe headaches, changes in vision). Instructed patient to seek medical care if BP > 180/110 and is accompanied by hypertensive s/sx associated, patient confirms understanding.     Assessment:  Reviewed recent readings. Per 2017 ACC/ AHA HTN guidelines (goal of BP < 130/80), current 30-day average needs to be addressed more thoroughly today.     Plan:  Continue current medication regimen.     Patient will increase her blood pressure readings to 2-3 readings a week.    I will continue to monitor regularly and will follow-up in 2 to 3 weeks, sooner if blood pressure begins to trend upward or downward.     Current medication regimen:  Hypertension Medications             valsartan (DIOVAN) 80 MG tablet Take 40 mg by mouth once daily.          Patient denies having questions or concerns. Patient has my contact information and knows to call with any concerns or clinical changes.

## 2019-05-14 ENCOUNTER — TELEPHONE (OUTPATIENT)
Dept: DERMATOLOGY | Facility: CLINIC | Age: 54
End: 2019-05-14

## 2019-05-14 NOTE — TELEPHONE ENCOUNTER
----- Message from Lou Herron sent at 5/14/2019  9:53 AM CDT -----  Contact: PT  PT is calling in to see if she can be seen for a skin check / spot on her hand on 05/23/2019 if possible because she is already bringing her mother there.     PT can be reached at [phone]

## 2019-05-15 ENCOUNTER — PATIENT OUTREACH (OUTPATIENT)
Dept: OTHER | Facility: OTHER | Age: 54
End: 2019-05-15

## 2019-05-15 NOTE — PROGRESS NOTES
Last 5 Patient Entered Readings                                      Current 30 Day Average: 102/70     Recent Readings 5/14/2019 5/9/2019 5/7/2019 5/4/2019 4/19/2019    SBP (mmHg) 101 94 106 112 99    DBP (mmHg) 70 70 74 70 64    Pulse 84 74 73 77 86          Introduces myself as patient's new health .  She stated that she appreciated the call.  She states that she feels like she has everything under control with her low sodium diet and exercise.  She has been compliant with the medication program and reports no questions or concerns.

## 2019-06-11 ENCOUNTER — OFFICE VISIT (OUTPATIENT)
Dept: DERMATOLOGY | Facility: CLINIC | Age: 54
End: 2019-06-11
Payer: COMMERCIAL

## 2019-06-11 DIAGNOSIS — D18.00 ANGIOMA: ICD-10-CM

## 2019-06-11 DIAGNOSIS — L82.1 SEBORRHEIC KERATOSES: ICD-10-CM

## 2019-06-11 DIAGNOSIS — Z12.83 SCREENING EXAM FOR SKIN CANCER: ICD-10-CM

## 2019-06-11 DIAGNOSIS — D22.9 MULTIPLE BENIGN NEVI: Primary | ICD-10-CM

## 2019-06-11 PROCEDURE — 99214 PR OFFICE/OUTPT VISIT, EST, LEVL IV, 30-39 MIN: ICD-10-PCS | Mod: S$GLB,,, | Performed by: DERMATOLOGY

## 2019-06-11 PROCEDURE — 99999 PR PBB SHADOW E&M-EST. PATIENT-LVL II: CPT | Mod: PBBFAC,,, | Performed by: DERMATOLOGY

## 2019-06-11 PROCEDURE — 99999 PR PBB SHADOW E&M-EST. PATIENT-LVL II: ICD-10-PCS | Mod: PBBFAC,,, | Performed by: DERMATOLOGY

## 2019-06-11 PROCEDURE — 99214 OFFICE O/P EST MOD 30 MIN: CPT | Mod: S$GLB,,, | Performed by: DERMATOLOGY

## 2019-06-11 NOTE — PROGRESS NOTES
Subjective:       Patient ID:  Rhoda Daly is a 54 y.o. female who presents for   Chief Complaint   Patient presents with    Skin Check     TBSE     Here for TBSE    Last seen 6/18 for treatment of t. Pedis with lamisil po qday x 1 month - resolved    No h/o nmsc or mm    Patient complains of lesion(s)  Location: right hand  Duration: 2 weeks  Symptoms: rough and raised  Relieving factors/Previous treatments:  none        Review of Systems   Skin: Positive for daily sunscreen use and activity-related sunscreen use. Negative for itching, rash and recent sunburn.   Hematologic/Lymphatic: Does not bruise/bleed easily.        Objective:    Physical Exam   Constitutional: She appears well-developed and well-nourished. No distress.   Neurological: She is alert and oriented to person, place, and time. She is not disoriented.   Psychiatric: She has a normal mood and affect.   Skin:   Areas Examined (abnormalities noted in diagram):   Scalp / Hair Palpated and Inspected  Head / Face Inspection Performed  Neck Inspection Performed  Chest / Axilla Inspection Performed  Abdomen Inspection Performed  Genitals / Buttocks / Groin Inspection Performed  Back Inspection Performed  RUE Inspected  LUE Inspection Performed  RLE Inspected  LLE Inspection Performed  Nails and Digits Inspection Performed                       Diagram Legend     Erythematous scaling macule/papule c/w actinic keratosis       Vascular papule c/w angioma      Pigmented verrucoid papule/plaque c/w seborrheic keratosis      Yellow umbilicated papule c/w sebaceous hyperplasia      Irregularly shaped tan macule c/w lentigo     1-2 mm smooth white papules consistent with Milia      Movable subcutaneous cyst with punctum c/w epidermal inclusion cyst      Subcutaneous movable cyst c/w pilar cyst      Firm pink to brown papule c/w dermatofibroma      Pedunculated fleshy papule(s) c/w skin tag(s)      Evenly pigmented macule c/w junctional nevus     Mildly  variegated pigmented, slightly irregular-bordered macule c/w mildly atypical nevus      Flesh colored to evenly pigmented papule c/w intradermal nevus       Pink pearly papule/plaque c/w basal cell carcinoma      Erythematous hyperkeratotic cursted plaque c/w SCC      Surgical scar with no sign of skin cancer recurrence      Open and closed comedones      Inflammatory papules and pustules      Verrucoid papule consistent consistent with wart     Erythematous eczematous patches and plaques     Dystrophic onycholytic nail with subungual debris c/w onychomycosis     Umbilicated papule    Erythematous-base heme-crusted tan verrucoid plaque consistent with inflamed seborrheic keratosis     Erythematous Silvery Scaling Plaque c/w Psoriasis     See annotation      Assessment / Plan:        Multiple benign nevi  totla body skin examination performed today including at least 12 points as noted in physical examination. No lesions suspicious for malignancy noted.  Reassurance provided.  Instructed patient to observe lesion(s) for changes and follow up in clinic if changes are noted. Discussed ABCDE's of moles and brochure provided.    Angioma  This is a benign vascular lesion. Reassurance given. No treatment required.       Seborrheic keratoses  These are benign inherited growths without a malignant potential. Reassurance given to patient. No treatment is necessary.       Screening exam for skin cancer    Total body skin examination performed today including at least 12 points as noted in physical examination. No lesions suspicious for malignancy noted.               Follow up if symptoms worsen or fail to improve.

## 2019-07-03 ENCOUNTER — PATIENT OUTREACH (OUTPATIENT)
Dept: OTHER | Facility: OTHER | Age: 54
End: 2019-07-03

## 2019-07-03 NOTE — PROGRESS NOTES
Last 5 Patient Entered Readings                                      Current 30 Day Average: 117/74     Recent Readings 7/1/2019 6/21/2019 6/14/2019 6/11/2019 6/9/2019    SBP (mmHg) 111 111 110 115 134    DBP (mmHg) 69 74 67 76 83    Pulse 72 78 72 72 66            [facility]: Health  Follow Up    Left voicemail to follow up with Hannah Rhoda Malika.  Current BP average 117/74 mmHg is at goal, 130/80 mmHg.

## 2019-07-22 NOTE — PROGRESS NOTES
Last 5 Patient Entered Readings                                      Current 30 Day Average: 104/68     Recent Readings 7/15/2019 7/9/2019 7/1/2019 6/21/2019 6/14/2019    SBP (mmHg) 103 99 111 111 110    DBP (mmHg) 68 67 69 74 67    Pulse 74 75 72 78 72          Digital Medicine: Health  Follow Up    Left voicemail to follow up with Hannah Daly.  Current BP average 104/68 mmHg is at goal, 130/80 mmHg

## 2019-08-05 NOTE — PROGRESS NOTES
"Last 5 Patient Entered Readings                                      Current 30 Day Average: 109/71     Recent Readings 7/29/2019 7/15/2019 7/9/2019 7/1/2019 6/21/2019    SBP (mmHg) 126 103 99 111 111    DBP (mmHg) 77 68 67 69 74    Pulse 68 74 75 72 78        Digital Medicine: Health  Follow Up    Lifestyle Modifications:    1.Dietary Modifications (Sodium intake <2,000mg/day, food labels, dining out): Patient reports no change in diet    2.Physical Activity: Patient states that she continues to walk and "be active"     3.Medication Therapy: Patient has been compliant with the medication regimen.    4.Patient has the following medication side effects/concerns: none reported  (Frequency/Alleviating factors/Precipitating factors, etc.)     Follow up with Mrs. Duong Malika completed. No further questions or concerns. Will continue to follow up to achieve health goals.      "

## 2019-09-02 RX ORDER — VALSARTAN 80 MG/1
TABLET ORAL
Qty: 90 TABLET | Refills: 0 | Status: SHIPPED | OUTPATIENT
Start: 2019-09-02 | End: 2020-03-01

## 2019-09-02 NOTE — TELEPHONE ENCOUNTER
Hi, please call patient and let the patient know that at [facility] we have developed a hypertension registry of patients and I see that this patient is due to see me back and have the high blood pressure evaluated along with overall health (annual exam).  I have sent in prescription refills.  Please offer an appointment.  Thank you, Manoj Stein

## 2019-09-04 ENCOUNTER — PATIENT OUTREACH (OUTPATIENT)
Dept: OTHER | Facility: OTHER | Age: 54
End: 2019-09-04

## 2019-09-04 NOTE — PROGRESS NOTES
Last 5 Patient Entered Readings                                      Current 30 Day Average: 117/74     Recent Readings 9/3/2019 8/30/2019 8/24/2019 8/17/2019 7/29/2019    SBP (mmHg) 112 115 129 110 126    DBP (mmHg) 73 75 75 74 77    Pulse 78 76 66 71 68          Digital Medicine: Health  Follow Up    Left voicemail to follow up with Mrs. Duong Malika.  Current BP average 117/74 mmHg is at goal, 130/80 mmHg.

## 2019-09-20 ENCOUNTER — PATIENT OUTREACH (OUTPATIENT)
Dept: OTHER | Facility: OTHER | Age: 54
End: 2019-09-20

## 2019-09-20 NOTE — PROGRESS NOTES
Digital Medicine: Clinician Follow-Up    Patient is working more causing her stress.    The history is provided by the patient.     Follow Up  Follow-up reason(s): reading review    Patient is at goal and is feeling well.                   Medication Adherence:   She misses doses: never            PLAN  patient verbalizes understanding    Patient will continue her current HTN medications at this time.      There are no preventive care reminders to display for this patient.    Last 5 Patient Entered Readings                                      Current 30 Day Average: 119/74     Recent Readings 9/3/2019 8/30/2019 8/24/2019 8/17/2019 7/29/2019    SBP (mmHg) 112 115 129 110 126    DBP (mmHg) 73 75 75 74 77    Pulse 78 76 66 71 68             Hypertension Medications             valsartan (DIOVAN) 80 MG tablet TAKE ONE BY MOUTH EVERY EVENING

## 2019-10-08 ENCOUNTER — TELEPHONE (OUTPATIENT)
Dept: INTERNAL MEDICINE | Facility: CLINIC | Age: 54
End: 2019-10-08

## 2019-10-08 DIAGNOSIS — Z00.00 ROUTINE GENERAL MEDICAL EXAMINATION AT A HEALTH CARE FACILITY: Primary | ICD-10-CM

## 2019-10-08 NOTE — TELEPHONE ENCOUNTER
----- Message from Tiffanie James sent at 10/8/2019  3:43 PM CDT -----  Contact: Patient [phone]  Patient has Annual Physical on:10/24/2018  Labs on: 10/24/2019    Thank You

## 2019-10-10 NOTE — TELEPHONE ENCOUNTER
Hi, please contact the patient to assist in scheduling    Orders Placed This Encounter    Comprehensive metabolic panel    Lipid panel       Thank you, Manoj Stein

## 2019-11-08 ENCOUNTER — PATIENT OUTREACH (OUTPATIENT)
Dept: ADMINISTRATIVE | Facility: OTHER | Age: 54
End: 2019-11-08

## 2019-11-13 ENCOUNTER — LAB VISIT (OUTPATIENT)
Dept: LAB | Facility: OTHER | Age: 54
End: 2019-11-13
Payer: COMMERCIAL

## 2019-11-13 ENCOUNTER — OFFICE VISIT (OUTPATIENT)
Dept: OPTOMETRY | Facility: CLINIC | Age: 54
End: 2019-11-13
Payer: COMMERCIAL

## 2019-11-13 DIAGNOSIS — H52.03 HYPEROPIA WITH PRESBYOPIA OF BOTH EYES: ICD-10-CM

## 2019-11-13 DIAGNOSIS — H52.4 HYPEROPIA WITH PRESBYOPIA OF BOTH EYES: ICD-10-CM

## 2019-11-13 DIAGNOSIS — Z00.00 ROUTINE GENERAL MEDICAL EXAMINATION AT A HEALTH CARE FACILITY: ICD-10-CM

## 2019-11-13 DIAGNOSIS — I10 ESSENTIAL HYPERTENSION: Primary | ICD-10-CM

## 2019-11-13 LAB
ALBUMIN SERPL BCP-MCNC: 4.2 G/DL (ref 3.5–5.2)
ALP SERPL-CCNC: 133 U/L (ref 55–135)
ALT SERPL W/O P-5'-P-CCNC: 22 U/L (ref 10–44)
ANION GAP SERPL CALC-SCNC: 9 MMOL/L (ref 8–16)
AST SERPL-CCNC: 26 U/L (ref 10–40)
BILIRUB SERPL-MCNC: 0.5 MG/DL (ref 0.1–1)
BUN SERPL-MCNC: 22 MG/DL (ref 6–20)
CALCIUM SERPL-MCNC: 9.9 MG/DL (ref 8.7–10.5)
CHLORIDE SERPL-SCNC: 105 MMOL/L (ref 95–110)
CHOLEST SERPL-MCNC: 245 MG/DL (ref 120–199)
CHOLEST/HDLC SERPL: 3 {RATIO} (ref 2–5)
CO2 SERPL-SCNC: 27 MMOL/L (ref 23–29)
CREAT SERPL-MCNC: 0.9 MG/DL (ref 0.5–1.4)
EST. GFR  (AFRICAN AMERICAN): >60 ML/MIN/1.73 M^2
EST. GFR  (NON AFRICAN AMERICAN): >60 ML/MIN/1.73 M^2
GLUCOSE SERPL-MCNC: 100 MG/DL (ref 70–110)
HDLC SERPL-MCNC: 81 MG/DL (ref 40–75)
HDLC SERPL: 33.1 % (ref 20–50)
LDLC SERPL CALC-MCNC: 147.6 MG/DL (ref 63–159)
NONHDLC SERPL-MCNC: 164 MG/DL
POTASSIUM SERPL-SCNC: 4.3 MMOL/L (ref 3.5–5.1)
PROT SERPL-MCNC: 7.2 G/DL (ref 6–8.4)
SODIUM SERPL-SCNC: 141 MMOL/L (ref 136–145)
TRIGL SERPL-MCNC: 82 MG/DL (ref 30–150)

## 2019-11-13 PROCEDURE — 80061 LIPID PANEL: CPT

## 2019-11-13 PROCEDURE — 92004 PR EYE EXAM, NEW PATIENT,COMPREHESV: ICD-10-PCS | Mod: S$GLB,,, | Performed by: OPTOMETRIST

## 2019-11-13 PROCEDURE — 92004 COMPRE OPH EXAM NEW PT 1/>: CPT | Mod: S$GLB,,, | Performed by: OPTOMETRIST

## 2019-11-13 PROCEDURE — 80053 COMPREHEN METABOLIC PANEL: CPT

## 2019-11-13 PROCEDURE — 99999 PR PBB SHADOW E&M-EST. PATIENT-LVL II: ICD-10-PCS | Mod: PBBFAC,,, | Performed by: OPTOMETRIST

## 2019-11-13 PROCEDURE — 99999 PR PBB SHADOW E&M-EST. PATIENT-LVL II: CPT | Mod: PBBFAC,,, | Performed by: OPTOMETRIST

## 2019-11-13 PROCEDURE — 36415 COLL VENOUS BLD VENIPUNCTURE: CPT

## 2019-11-13 NOTE — PROGRESS NOTES
HPI     Last eye exam was 9/10/15 with Dr. Bearden.  Patient states never liked glasses rx from last exam. Uses +1.50-+1.75 OTC   readers all the time. Felt like she could never get used to PAL's can find   spot for computer distance.  Patient denies diplopia, headaches, flashes/floaters, and pain.      Last edited by Deloris Sue on 11/13/2019  7:50 AM. (History)            Assessment /Plan     For exam results, see Encounter Report.    Essential hypertension    Hyperopia with presbyopia of both eyes            1.  No retinopathy--monitor yearly.  BP control.  Eye health normal OU.  2.  No rx given--pt could not adjust to PALs.  Recommend Distance=+1.00, Computer=+1.75/+2.00, and Near=+2.50.

## 2019-11-21 ENCOUNTER — HOSPITAL ENCOUNTER (OUTPATIENT)
Dept: RADIOLOGY | Facility: HOSPITAL | Age: 54
Discharge: HOME | End: 2019-11-21
Attending: OBSTETRICS & GYNECOLOGY
Payer: COMMERCIAL

## 2019-11-21 ENCOUNTER — OFFICE VISIT (OUTPATIENT)
Dept: INTERNAL MEDICINE | Facility: CLINIC | Age: 54
End: 2019-11-21
Payer: COMMERCIAL

## 2019-11-21 VITALS
BODY MASS INDEX: 22.32 KG/M2 | DIASTOLIC BLOOD PRESSURE: 72 MMHG | HEIGHT: 64 IN | HEART RATE: 62 BPM | SYSTOLIC BLOOD PRESSURE: 116 MMHG | WEIGHT: 130.75 LBS | OXYGEN SATURATION: 99 %

## 2019-11-21 DIAGNOSIS — Z00.00 ROUTINE GENERAL MEDICAL EXAMINATION AT A HEALTH CARE FACILITY: Primary | ICD-10-CM

## 2019-11-21 DIAGNOSIS — Z12.31 ENCOUNTER FOR SCREENING MAMMOGRAM FOR BREAST CANCER: ICD-10-CM

## 2019-11-21 PROCEDURE — 77063 BREAST TOMOSYNTHESIS BI: CPT | Mod: TC

## 2019-11-21 PROCEDURE — 77067 MAMMO DIGITAL SCREENING BILAT WITH TOMOSYNTHESIS_CAD: ICD-10-PCS | Mod: 26,,, | Performed by: RADIOLOGY

## 2019-11-21 PROCEDURE — 77067 SCR MAMMO BI INCL CAD: CPT | Mod: 26,,, | Performed by: RADIOLOGY

## 2019-11-21 PROCEDURE — 3074F SYST BP LT 130 MM HG: CPT | Mod: CPTII,S$GLB,, | Performed by: INTERNAL MEDICINE

## 2019-11-21 PROCEDURE — 99396 PREV VISIT EST AGE 40-64: CPT | Mod: S$GLB,,, | Performed by: INTERNAL MEDICINE

## 2019-11-21 PROCEDURE — 77063 BREAST TOMOSYNTHESIS BI: CPT | Mod: 26,,, | Performed by: RADIOLOGY

## 2019-11-21 PROCEDURE — 77067 SCR MAMMO BI INCL CAD: CPT | Mod: TC

## 2019-11-21 PROCEDURE — 99396 PR PREVENTIVE VISIT,EST,40-64: ICD-10-PCS | Mod: S$GLB,,, | Performed by: INTERNAL MEDICINE

## 2019-11-21 PROCEDURE — 99999 PR PBB SHADOW E&M-EST. PATIENT-LVL III: CPT | Mod: PBBFAC,,, | Performed by: INTERNAL MEDICINE

## 2019-11-21 PROCEDURE — 77063 MAMMO DIGITAL SCREENING BILAT WITH TOMOSYNTHESIS_CAD: ICD-10-PCS | Mod: 26,,, | Performed by: RADIOLOGY

## 2019-11-21 PROCEDURE — 99999 PR PBB SHADOW E&M-EST. PATIENT-LVL III: ICD-10-PCS | Mod: PBBFAC,,, | Performed by: INTERNAL MEDICINE

## 2019-11-21 PROCEDURE — 3078F DIAST BP <80 MM HG: CPT | Mod: CPTII,S$GLB,, | Performed by: INTERNAL MEDICINE

## 2019-11-21 PROCEDURE — 3074F PR MOST RECENT SYSTOLIC BLOOD PRESSURE < 130 MM HG: ICD-10-PCS | Mod: CPTII,S$GLB,, | Performed by: INTERNAL MEDICINE

## 2019-11-21 PROCEDURE — 3078F PR MOST RECENT DIASTOLIC BLOOD PRESSURE < 80 MM HG: ICD-10-PCS | Mod: CPTII,S$GLB,, | Performed by: INTERNAL MEDICINE

## 2019-11-21 NOTE — PROGRESS NOTES
Subjective:       Patient ID: Rhoda Daly is a 54 y.o. female.    Chief Complaint: Annual Exam    Here for annual exam    Home pressures mostly well controlled.    Some increased eating out. 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    Has had higinio lipids, but does consider fat in diet.    Review of Systems   Constitutional: Negative for activity change, appetite change and unexpected weight change.   Eyes: Negative for visual disturbance.   Respiratory: Negative for cough, chest tightness and shortness of breath.    Cardiovascular: Negative for chest pain.   Gastrointestinal: Negative for abdominal distention and abdominal pain.   Genitourinary: Negative for difficulty urinating and urgency.   Skin: Negative for rash.       Objective:      Physical Exam   Constitutional: She is oriented to person, place, and time. She appears well-developed and well-nourished. No distress.   HENT:   Head: Normocephalic and atraumatic.   Eyes: Pupils are equal, round, and reactive to light. No scleral icterus.   Neck: Normal range of motion. No thyromegaly present.   Cardiovascular: Normal rate, regular rhythm and normal heart sounds. Exam reveals no gallop and no friction rub.   No murmur heard.  Pulmonary/Chest: Effort normal and breath sounds normal. No respiratory distress. She has no wheezes. She has no rales.   Abdominal: Soft. Bowel sounds are normal. She exhibits no distension and no mass. There is no tenderness. There is no rebound and no guarding.   Musculoskeletal: Normal range of motion. She exhibits no edema or tenderness.   Lymphadenopathy:     She has no cervical adenopathy.   Neurological: She is alert and oriented to person, place, and time.   Skin: She is not diaphoretic.   Psychiatric: She has a normal mood and affect. Her speech is normal and behavior is normal. Cognition and memory are normal.       Assessment:       No diagnosis found.    Plan:       Here for annual exam      The 10-year ASCVD risk score (Rush PATRICIA Jr., et al.,  2013) is: 1.9%    Values used to calculate the score:      Age: 54 years      Sex: Female      Is Non- : No      Diabetic: No      Tobacco smoker: No      Systolic Blood Pressure: 116 mmHg      Is BP treated: Yes      HDL Cholesterol: 81 mg/dL      Total Cholesterol: 245 mg/dL  Offered cor calcium score she will consider.    HTN well controlled    Patient Instructions   Consider a coronary calcium scan    Consider the Shingrix vaccine        Health Maintenance       Date Due Completion Date    Shingles Vaccine (1 of 2) 05/31/2014 ---    Mammogram 09/18/2020 9/18/2018    Pap Smear with HPV Cotest 01/17/2022 1/17/2019    Lipid Panel 11/13/2024 11/13/2019    Colonoscopy 01/06/2025 1/6/2015    TETANUS VACCINE 02/08/2026 2/8/2016          Follow up in about 1 year (around 11/21/2020).    Future Appointments   Date Time Provider Department Center   11/21/2019  9:15 AM [id number]

## 2019-12-18 ENCOUNTER — PATIENT OUTREACH (OUTPATIENT)
Dept: OTHER | Facility: OTHER | Age: 54
End: 2019-12-18

## 2020-01-30 NOTE — PROGRESS NOTES
"Digital Medicine: Health  Follow-Up    Patient stated she was at a work event and didn't have time to chat long, but said everything was "all good".  She thanked me for calling and hung up.    The history is provided by the patient. No  was used.     Follow Up  Follow-up reason(s): reading review      Readings are trending down       INTERVENTION(S)  denied further coaching, denied resources, denied support and denied questions    PLAN  continue monitoring    Patient remains well controlled. I will attempt to address lifestyle more thoroughly at next encounter and my send [employer] message to request update.      There are no preventive care reminders to display for this patient.    Last 5 Patient Entered Readings                                      Current 30 Day Average: 115/77     Recent Readings 1/27/2020 1/14/2020 1/9/2020 1/8/2020 12/30/2019    SBP (mmHg) 112 96 133 118 99    DBP (mmHg) 80 63 87 77 65    Pulse 73 76 84 79 70                  Screenings    SDOH  "

## 2020-03-01 RX ORDER — VALSARTAN 80 MG/1
TABLET ORAL
Qty: 90 TABLET | Refills: 2 | Status: SHIPPED | OUTPATIENT
Start: 2020-03-01 | End: 2020-09-23

## 2020-03-03 ENCOUNTER — PATIENT OUTREACH (OUTPATIENT)
Dept: OTHER | Facility: OTHER | Age: 55
End: 2020-03-03

## 2020-03-10 NOTE — PROGRESS NOTES
Digital Medicine: Clinician Follow-Up    Called patient to discuss her at goal readings.    The history is provided by the patient.     Follow Up  Follow-up reason(s): reading review    Patient's blood pressure is trending down and she is feeling well. She denies hypotension symptoms.    Patient's blood pressure is well controlled.      INTERVENTION(S)  reviewed appropriate dose schedule and encouragement/support    PLAN  patient verbalizes understanding and continue monitoring    Patient will maintain her current hypertension medication regimen of valsartan 40mg daily cutting the 80mg tablet in half. She did not want to reduce valsartan any further today.    Patient is aware to contact us with any changes or problems prior to my next outreach.      There are no preventive care reminders to display for this patient.    Last 5 Patient Entered Readings                                      Current 30 Day Average: 101/68     Recent Readings 3/6/2020 2/27/2020 2/12/2020 2/5/2020 1/27/2020    SBP (mmHg) 114 91 98 104 112    DBP (mmHg) 78 62 63 70 80    Pulse 75 69 61 73 73             Hypertension Medications             valsartan (DIOVAN) 80 MG tablet TAKE ONE HALF TABLET BY MOUTH EVERY EVENING                 Screenings

## 2020-03-26 ENCOUNTER — PATIENT OUTREACH (OUTPATIENT)
Dept: OTHER | Facility: OTHER | Age: 55
End: 2020-03-26

## 2020-03-26 NOTE — PROGRESS NOTES
Digital Medicine: Health  Follow-Up    Called patient for health  follow up.  She states that she is doing well and does not need anything from me.    The history is provided by the patient. No  was used.     Follow Up  Follow-up reason(s): reading review          INTERVENTION(S)  denied resources and denied questions    PLAN  continue monitoring      There are no preventive care reminders to display for this patient.    Last 5 Patient Entered Readings                                      Current 30 Day Average: 104/70     Recent Readings 3/23/2020 3/6/2020 2/27/2020 2/12/2020 2/5/2020    SBP (mmHg) 108 114 91 98 104    DBP (mmHg) 70 78 62 63 70    Pulse 69 75 69 61 73                      Diet Screening   No change to diet.  Patient reports eating or drinking the following: fruit, water, fresh vegetables and lean proteins, whole grainsShe has the following dietary restrictions: low sodium diet    Barriers to a Healthy Diet: no barriers to healthy eating    Assigning the following patient goals: maintain low sodium diet    Physical Activity Screening   No change to exercise routine.    When asked if exercising, patient responded: yes    Patient participates in the following activities: walking    Medication Adherence Screening   She did not miss a dose this month.  Patient knows purpose of medications.      Patient identified the following reasons for non-compliance: None      SDOH

## 2020-06-11 ENCOUNTER — PATIENT OUTREACH (OUTPATIENT)
Dept: OTHER | Facility: OTHER | Age: 55
End: 2020-06-11

## 2020-08-11 ENCOUNTER — OFFICE VISIT (OUTPATIENT)
Dept: INTERNAL MEDICINE | Facility: CLINIC | Age: 55
End: 2020-08-11
Payer: COMMERCIAL

## 2020-08-11 VITALS
DIASTOLIC BLOOD PRESSURE: 70 MMHG | WEIGHT: 130.31 LBS | SYSTOLIC BLOOD PRESSURE: 120 MMHG | HEART RATE: 82 BPM | HEIGHT: 64 IN | OXYGEN SATURATION: 98 % | BODY MASS INDEX: 22.25 KG/M2

## 2020-08-11 DIAGNOSIS — H00.014 HORDEOLUM EXTERNUM OF LEFT UPPER EYELID: ICD-10-CM

## 2020-08-11 DIAGNOSIS — D17.1 LIPOMA OF TORSO: Primary | ICD-10-CM

## 2020-08-11 PROCEDURE — 99213 OFFICE O/P EST LOW 20 MIN: CPT | Mod: S$GLB,,, | Performed by: INTERNAL MEDICINE

## 2020-08-11 PROCEDURE — 3008F PR BODY MASS INDEX (BMI) DOCUMENTED: ICD-10-PCS | Mod: CPTII,S$GLB,, | Performed by: INTERNAL MEDICINE

## 2020-08-11 PROCEDURE — 3078F DIAST BP <80 MM HG: CPT | Mod: CPTII,S$GLB,, | Performed by: INTERNAL MEDICINE

## 2020-08-11 PROCEDURE — 99999 PR PBB SHADOW E&M-EST. PATIENT-LVL III: CPT | Mod: PBBFAC,,, | Performed by: INTERNAL MEDICINE

## 2020-08-11 PROCEDURE — 3008F BODY MASS INDEX DOCD: CPT | Mod: CPTII,S$GLB,, | Performed by: INTERNAL MEDICINE

## 2020-08-11 PROCEDURE — 3074F PR MOST RECENT SYSTOLIC BLOOD PRESSURE < 130 MM HG: ICD-10-PCS | Mod: CPTII,S$GLB,, | Performed by: INTERNAL MEDICINE

## 2020-08-11 PROCEDURE — 3078F PR MOST RECENT DIASTOLIC BLOOD PRESSURE < 80 MM HG: ICD-10-PCS | Mod: CPTII,S$GLB,, | Performed by: INTERNAL MEDICINE

## 2020-08-11 PROCEDURE — 3074F SYST BP LT 130 MM HG: CPT | Mod: CPTII,S$GLB,, | Performed by: INTERNAL MEDICINE

## 2020-08-11 PROCEDURE — 99999 PR PBB SHADOW E&M-EST. PATIENT-LVL III: ICD-10-PCS | Mod: PBBFAC,,, | Performed by: INTERNAL MEDICINE

## 2020-08-11 PROCEDURE — 99213 PR OFFICE/OUTPT VISIT, EST, LEVL III, 20-29 MIN: ICD-10-PCS | Mod: S$GLB,,, | Performed by: INTERNAL MEDICINE

## 2020-08-11 NOTE — PROGRESS NOTES
Subjective:       Patient ID: Rhoda Daly is a 55 y.o. female.    Chief Complaint: Mass (on ribs and eye)    Here for urgent care --  Lump lower L rib, wants examined.  Not painful, no skin changes. Feels well o/w.    Also L upper lid eye stye, which has improved.    Review of Systems   Constitutional: Negative for activity change and fatigue.   Eyes: Negative for visual disturbance.   Respiratory: Negative for shortness of breath and wheezing.    Gastrointestinal: Negative for abdominal distention and abdominal pain.   Skin: Negative for rash and wound.           Objective:      Physical Exam  Constitutional:       General: She is not in acute distress.     Appearance: Normal appearance. She is normal weight. She is not toxic-appearing.   Eyes:      Comments: L upper medial eye lid with resolving stye, no purulent d/c noted, no abscess   Skin:     Comments: R lower rib cage area with dermal based nodule --   moveable, nontender, pea sized, not hard c/w lipoma     Neurological:      Mental Status: She is alert.         Assessment:       1. Lipoma of torso    2. Hordeolum externum of left upper eyelid        Plan:       Rhoda was seen today for mass.    Diagnoses and all orders for this visit:      Lipoma of torso  She will monitor    Hordeolum externum of left upper eyelid  resolving      Health Maintenance       Date Due Completion Date    HIV Screening 05/31/1979 ---    Shingles Vaccine (1 of 2) 05/31/2014 ---    Influenza Vaccine (1) 09/01/2020 10/12/2019    Mammogram 11/21/2021 11/21/2019    Cervical Cancer Screening 01/17/2022 1/17/2019    Lipid Panel 11/13/2024 11/13/2019    Colorectal Cancer Screening 01/06/2025 1/6/2015    TETANUS VACCINE 02/08/2026 2/8/2016          Return to clinic previously agreed (at last visit) next due visit      No future appointments.

## 2020-09-23 ENCOUNTER — PATIENT OUTREACH (OUTPATIENT)
Dept: OTHER | Facility: OTHER | Age: 55
End: 2020-09-23

## 2020-09-23 DIAGNOSIS — I10 ESSENTIAL HYPERTENSION: Primary | ICD-10-CM

## 2020-09-23 RX ORDER — VALSARTAN 80 MG/1
40 TABLET ORAL NIGHTLY
Qty: 90 TABLET | Refills: 2
Start: 2020-09-23 | End: 2021-08-17 | Stop reason: SDUPTHER

## 2020-09-23 NOTE — PROGRESS NOTES
Digital Medicine: Clinician Follow-Up    Patient returned my call to discuss her at missed readings and do a six month follow-up.    The history is provided by the patient.      Review of patient's allergies indicates:  No Known Allergies  Follow-up reason(s): routine follow up.     Hypertension  Patient needs assistance troubleshooting: tech support needed.    Patient is not experiencing signs/symptoms of hypotension.  Patient is not experiencing signs/symptoms of hypertension.          Last 5 Patient Entered Readings                                      Current 30 Day Average:      Recent Readings 5/5/2020 4/26/2020 4/17/2020 3/23/2020 3/6/2020    SBP (mmHg) 119 114 118 108 114    DBP (mmHg) 85 71 80 70 78    Pulse 72 66 73 69 75                             Medication Adherence-Medication adherence was assessed.          ASSESSMENT(S)  Patients BP average is Not Enough Data/Not Enough Data mmHg, which is at goal. Patient's BP goal is less than or equal to 130/80 per 2017 ACC/AHA Hypertension Guidelines.    Patient has taken readings that aren't crossing over despite logging into [employer]. Her readings have all been at goal.       Hypertension Plan  Continue current therapy.  Tech support needed. CRM created.  Provided patient education. Discussed contacting me with any hypotension symptoms prior to my next outreach.       Addressed patient questions and patient has my contact information if needed prior to next outreach. Patient verbalizes understanding.            There are no preventive care reminders to display for this patient.  There are no preventive care reminders to display for this patient.    Hypertension Medications             valsartan (DIOVAN) 80 MG tablet TAKE ONE-Half tablet BY MOUTH EVERY EVENING

## 2020-10-01 ENCOUNTER — HOSPITAL ENCOUNTER (OUTPATIENT)
Dept: RADIOLOGY | Facility: HOSPITAL | Age: 55
Discharge: HOME | End: 2020-10-01
Attending: INTERNAL MEDICINE
Payer: COMMERCIAL

## 2020-10-01 ENCOUNTER — OFFICE VISIT (OUTPATIENT)
Dept: INTERNAL MEDICINE | Facility: CLINIC | Age: 55
End: 2020-10-01
Payer: COMMERCIAL

## 2020-10-01 VITALS
SYSTOLIC BLOOD PRESSURE: 126 MMHG | WEIGHT: 128.31 LBS | OXYGEN SATURATION: 98 % | HEIGHT: 64 IN | BODY MASS INDEX: 21.91 KG/M2 | HEART RATE: 67 BPM | DIASTOLIC BLOOD PRESSURE: 78 MMHG

## 2020-10-01 DIAGNOSIS — R10.31 RIGHT LOWER QUADRANT PAIN: ICD-10-CM

## 2020-10-01 LAB
BILIRUB SERPL-MCNC: NEGATIVE MG/DL
BLOOD URINE, POC: NORMAL
CLARITY, POC UA: CLEAR
COLOR, POC UA: NORMAL
CREAT SERPL-MCNC: 0.8 MG/DL (ref 0.5–1.4)
GLUCOSE UR QL STRIP: NORMAL
KETONES UR QL STRIP: NEGATIVE
LEUKOCYTE ESTERASE URINE, POC: NEGATIVE
NITRITE, POC UA: NEGATIVE
PH, POC UA: 5
PROTEIN, POC: NORMAL
SAMPLE: NORMAL
SPECIFIC GRAVITY, POC UA: 1.01
UROBILINOGEN, POC UA: NORMAL

## 2020-10-01 PROCEDURE — 81002 POCT URINE DIPSTICK WITHOUT MICROSCOPE: ICD-10-PCS | Mod: S$GLB,,, | Performed by: INTERNAL MEDICINE

## 2020-10-01 PROCEDURE — 99213 OFFICE O/P EST LOW 20 MIN: CPT | Mod: 25,S$GLB,, | Performed by: INTERNAL MEDICINE

## 2020-10-01 PROCEDURE — 74177 CT ABD & PELVIS W/CONTRAST: CPT | Mod: TC

## 2020-10-01 PROCEDURE — 99213 PR OFFICE/OUTPT VISIT, EST, LEVL III, 20-29 MIN: ICD-10-PCS | Mod: 25,S$GLB,, | Performed by: INTERNAL MEDICINE

## 2020-10-01 PROCEDURE — 3008F PR BODY MASS INDEX (BMI) DOCUMENTED: ICD-10-PCS | Mod: CPTII,S$GLB,, | Performed by: INTERNAL MEDICINE

## 2020-10-01 PROCEDURE — 3074F PR MOST RECENT SYSTOLIC BLOOD PRESSURE < 130 MM HG: ICD-10-PCS | Mod: CPTII,S$GLB,, | Performed by: INTERNAL MEDICINE

## 2020-10-01 PROCEDURE — 99999 PR PBB SHADOW E&M-EST. PATIENT-LVL III: CPT | Mod: PBBFAC,,, | Performed by: INTERNAL MEDICINE

## 2020-10-01 PROCEDURE — 74177 CT ABDOMEN PELVIS WITH CONTRAST: ICD-10-PCS | Mod: 26,,, | Performed by: RADIOLOGY

## 2020-10-01 PROCEDURE — 3008F BODY MASS INDEX DOCD: CPT | Mod: CPTII,S$GLB,, | Performed by: INTERNAL MEDICINE

## 2020-10-01 PROCEDURE — 3078F PR MOST RECENT DIASTOLIC BLOOD PRESSURE < 80 MM HG: ICD-10-PCS | Mod: CPTII,S$GLB,, | Performed by: INTERNAL MEDICINE

## 2020-10-01 PROCEDURE — 99999 PR PBB SHADOW E&M-EST. PATIENT-LVL III: ICD-10-PCS | Mod: PBBFAC,,, | Performed by: INTERNAL MEDICINE

## 2020-10-01 PROCEDURE — 74177 CT ABD & PELVIS W/CONTRAST: CPT | Mod: 26,,, | Performed by: RADIOLOGY

## 2020-10-01 PROCEDURE — 81002 URINALYSIS NONAUTO W/O SCOPE: CPT | Mod: S$GLB,,, | Performed by: INTERNAL MEDICINE

## 2020-10-01 PROCEDURE — 3074F SYST BP LT 130 MM HG: CPT | Mod: CPTII,S$GLB,, | Performed by: INTERNAL MEDICINE

## 2020-10-01 PROCEDURE — 25500020 PHARM REV CODE 255: Performed by: INTERNAL MEDICINE

## 2020-10-01 PROCEDURE — 3078F DIAST BP <80 MM HG: CPT | Mod: CPTII,S$GLB,, | Performed by: INTERNAL MEDICINE

## 2020-10-01 RX ADMIN — IOHEXOL 75 ML: 350 INJECTION, SOLUTION INTRAVENOUS at 04:10

## 2020-10-01 RX ADMIN — IOHEXOL 15 ML: 350 INJECTION, SOLUTION INTRAVENOUS at 02:10

## 2020-10-01 RX ADMIN — IOHEXOL 15 ML: 350 INJECTION, SOLUTION INTRAVENOUS at 01:10

## 2020-10-01 NOTE — PROGRESS NOTES
Subjective:       Patient ID: Rhoda Daly is a 55 y.o. female.    Chief Complaint: No chief complaint on file.    55 year old lady complains of RLQ pain for about 24 hours.  Was briefly better upon awakening today but got worse again.  Has history of very similar pain about 6 years ago.  She was told she had an infection in her cecum and got IV antibiotics for about 24 hours.  No surgery.  Appetite is ok, nl BM this am    Review of Systems   Constitutional: Negative for activity change, chills, fatigue and fever.   HENT: Negative for congestion, ear pain, nosebleeds, postnasal drip, sinus pressure and sore throat.    Eyes: Negative.  Negative for visual disturbance.   Respiratory: Negative for cough, chest tightness, shortness of breath and wheezing.    Cardiovascular: Negative for chest pain.   Gastrointestinal: Negative for abdominal pain, diarrhea, nausea and vomiting.   Genitourinary: Negative for difficulty urinating, dysuria, frequency and urgency.   Musculoskeletal: Negative for arthralgias and neck stiffness.   Skin: Negative for rash.   Neurological: Negative for dizziness, weakness and headaches.   Psychiatric/Behavioral: Negative for sleep disturbance. The patient is not nervous/anxious.        Objective:      Physical Exam  Constitutional:       General: She is not in acute distress.     Appearance: She is well-developed. She is not toxic-appearing.   HENT:      Head: Normocephalic and atraumatic.      Right Ear: Tympanic membrane, ear canal and external ear normal.      Left Ear: Tympanic membrane, ear canal and external ear normal.   Eyes:      General: No scleral icterus.     Pupils: Pupils are equal, round, and reactive to light.   Neck:      Musculoskeletal: Normal range of motion and neck supple.      Thyroid: No thyromegaly.   Cardiovascular:      Rate and Rhythm: Normal rate and regular rhythm.      Heart sounds: Normal heart sounds.   Pulmonary:      Effort: Pulmonary effort is normal.       Breath sounds: Normal breath sounds.   Abdominal:      General: Bowel sounds are normal.      Palpations: Abdomen is soft. There is no mass.      Tenderness: There is abdominal tenderness in the right lower quadrant. There is guarding and rebound.   Musculoskeletal: Normal range of motion.   Lymphadenopathy:      Cervical: No cervical adenopathy.   Skin:     General: Skin is warm and dry.   Neurological:      Mental Status: She is alert and oriented to person, place, and time.      Cranial Nerves: No cranial nerve deficit.      Motor: No abnormal muscle tone.      Coordination: Coordination normal.      Deep Tendon Reflexes: Reflexes are normal and symmetric. Reflexes normal.   Psychiatric:         Behavior: Behavior normal.         Assessment:       1. Right lower quadrant pain        Plan:   Diagnoses and all orders for this visit:    Right lower quadrant pain  -     CBC auto differential; Future  -     Comprehensive metabolic panel; Future  -     POCT urine dipstick without microscope  -     CT Abdomen Pelvis With Contrast; Future

## 2020-10-02 ENCOUNTER — PATIENT MESSAGE (OUTPATIENT)
Dept: INTERNAL MEDICINE | Facility: CLINIC | Age: 55
End: 2020-10-02

## 2020-10-02 RX ORDER — CIPROFLOXACIN 500 MG/1
500 TABLET ORAL 2 TIMES DAILY
Qty: 20 TABLET | Refills: 0 | Status: SHIPPED | OUTPATIENT
Start: 2020-10-02 | End: 2020-10-12

## 2020-10-02 RX ORDER — METRONIDAZOLE 500 MG/1
500 TABLET ORAL 3 TIMES DAILY
Qty: 30 TABLET | Refills: 0 | Status: SHIPPED | OUTPATIENT
Start: 2020-10-02 | End: 2020-10-12

## 2020-10-07 ENCOUNTER — PATIENT MESSAGE (OUTPATIENT)
Dept: INTERNAL MEDICINE | Facility: CLINIC | Age: 55
End: 2020-10-07

## 2020-10-08 NOTE — TELEPHONE ENCOUNTER
I called her, she will try to stay on abx for 7 days total (offered a diff one for 5 days which she declines)  She is feeling btr in general and more dealing with side effects  She has had colo and will try to get OSH records transferred here  She will try low residue low fiber diet for next 7-10 days  Offered appt with me she declines  She knows she will need a colo 1-2 mos after symptoms resolving (depending on when last colo was)    ?s answered

## 2020-10-30 ENCOUNTER — PATIENT MESSAGE (OUTPATIENT)
Dept: ADMINISTRATIVE | Facility: HOSPITAL | Age: 55
End: 2020-10-30

## 2020-12-22 ENCOUNTER — PATIENT MESSAGE (OUTPATIENT)
Dept: INTERNAL MEDICINE | Facility: CLINIC | Age: 55
End: 2020-12-22

## 2020-12-23 ENCOUNTER — PATIENT OUTREACH (OUTPATIENT)
Dept: ADMINISTRATIVE | Facility: HOSPITAL | Age: 55
End: 2020-12-23

## 2020-12-23 NOTE — LETTER
AUTHORIZATION FOR RELEASE OF   CONFIDENTIAL INFORMATION    Dear Dr. Teddy Woodson,    We are seeing Rhoda Daly, date of birth 1965, in the clinic at [facility]. Manoj Stein MD is the patient's PCP. Rhoda Daly has an outstanding lab/procedure at the time we reviewed her chart. In order to help keep her health information updated, she has authorized us to request the following medical record(s):        (  )  MAMMOGRAM                                      (X)  COLONOSCOPY      (  )  PAP SMEAR                                          (  )  OUTSIDE LAB RESULTS     (  )  DEXA SCAN                                          (  )  EYE EXAM            (  )  FOOT EXAM                                          (  )  ENTIRE RECORD     (  )  OUTSIDE IMMUNIZATIONS                 (  )  _______________         Please fax records to [facility], Evan L Dvorin, MD, [phone]     If you have any questions, please contact HAYLEY [place] at [phone].           Patient Name: Rhoda Daly  : 1965  Patient Phone #: [phone]

## 2020-12-23 NOTE — PROGRESS NOTES
Health Maintenance Due   Topic Date Due    HIV Screening  05/31/1979    Shingles Vaccine (1 of 2) 05/31/2014    Colorectal Cancer Screening  05/31/2014     Records request has been sent to Dr. Teddy Woodson at [facility] to obtain a copy of pt's most recent colonoscopy results.  Chart review completed.

## 2020-12-28 ENCOUNTER — PATIENT OUTREACH (OUTPATIENT)
Dept: ADMINISTRATIVE | Facility: HOSPITAL | Age: 55
End: 2020-12-28

## 2020-12-28 ENCOUNTER — TELEPHONE (OUTPATIENT)
Dept: OBSTETRICS AND GYNECOLOGY | Facility: CLINIC | Age: 55
End: 2020-12-28

## 2020-12-28 NOTE — TELEPHONE ENCOUNTER
Reached out to patient , unable to reach patient lvm       ----- Message from Na Wills sent at 12/28/2020 11:30 AM CST -----  Patient called to say she has called 3 times to get an appt with Dr. Soares with no response. She would like a call back at [phone]

## 2020-12-29 ENCOUNTER — TELEPHONE (OUTPATIENT)
Dept: OBSTETRICS AND GYNECOLOGY | Facility: CLINIC | Age: 55
End: 2020-12-29

## 2020-12-29 DIAGNOSIS — Z12.31 ENCOUNTER FOR SCREENING MAMMOGRAM FOR BREAST CANCER: Primary | ICD-10-CM

## 2020-12-29 NOTE — TELEPHONE ENCOUNTER
Reached out to patient , Scheduled appointment for annual     ----- Message from Mariana Patterson sent at 12/29/2020 12:52 PM CST -----  Regarding: pt call back  Contact: pt  Pt was retuning call from 12/28/2020 in regards to appt       Patient called to say she has called 3 times to get an appt with Dr. Soares with no response. She would like a call back at [phone]

## 2020-12-31 ENCOUNTER — HOSPITAL ENCOUNTER (OUTPATIENT)
Dept: RADIOLOGY | Facility: HOSPITAL | Age: 55
Discharge: HOME | End: 2020-12-31
Attending: OBSTETRICS & GYNECOLOGY
Payer: COMMERCIAL

## 2020-12-31 DIAGNOSIS — Z12.31 ENCOUNTER FOR SCREENING MAMMOGRAM FOR BREAST CANCER: ICD-10-CM

## 2020-12-31 PROCEDURE — 77063 MAMMO DIGITAL SCREENING BILAT WITH TOMO: ICD-10-PCS | Mod: 26,,, | Performed by: RADIOLOGY

## 2020-12-31 PROCEDURE — 77067 SCR MAMMO BI INCL CAD: CPT | Mod: TC

## 2020-12-31 PROCEDURE — 77063 BREAST TOMOSYNTHESIS BI: CPT | Mod: 26,,, | Performed by: RADIOLOGY

## 2020-12-31 PROCEDURE — 77067 MAMMO DIGITAL SCREENING BILAT WITH TOMO: ICD-10-PCS | Mod: 26,,, | Performed by: RADIOLOGY

## 2020-12-31 PROCEDURE — 77067 SCR MAMMO BI INCL CAD: CPT | Mod: 26,,, | Performed by: RADIOLOGY

## 2021-02-17 ENCOUNTER — PATIENT MESSAGE (OUTPATIENT)
Dept: INTERNAL MEDICINE | Facility: CLINIC | Age: 56
End: 2021-02-17

## 2021-02-28 ENCOUNTER — PATIENT MESSAGE (OUTPATIENT)
Dept: INTERNAL MEDICINE | Facility: CLINIC | Age: 56
End: 2021-02-28

## 2021-03-02 ENCOUNTER — PATIENT OUTREACH (OUTPATIENT)
Dept: ADMINISTRATIVE | Facility: OTHER | Age: 56
End: 2021-03-02

## 2021-03-04 ENCOUNTER — OFFICE VISIT (OUTPATIENT)
Dept: OBSTETRICS AND GYNECOLOGY | Facility: CLINIC | Age: 56
End: 2021-03-04
Payer: COMMERCIAL

## 2021-03-04 VITALS
BODY MASS INDEX: 21.91 KG/M2 | WEIGHT: 127.63 LBS | DIASTOLIC BLOOD PRESSURE: 58 MMHG | SYSTOLIC BLOOD PRESSURE: 102 MMHG

## 2021-03-04 DIAGNOSIS — Z12.31 ENCOUNTER FOR SCREENING MAMMOGRAM FOR BREAST CANCER: ICD-10-CM

## 2021-03-04 DIAGNOSIS — Z01.419 VISIT FOR GYNECOLOGIC EXAMINATION: Primary | ICD-10-CM

## 2021-03-04 PROCEDURE — 3078F PR MOST RECENT DIASTOLIC BLOOD PRESSURE < 80 MM HG: ICD-10-PCS | Mod: CPTII,S$GLB,, | Performed by: OBSTETRICS & GYNECOLOGY

## 2021-03-04 PROCEDURE — 1126F PR PAIN SEVERITY QUANTIFIED, NO PAIN PRESENT: ICD-10-PCS | Mod: S$GLB,,, | Performed by: OBSTETRICS & GYNECOLOGY

## 2021-03-04 PROCEDURE — 3074F SYST BP LT 130 MM HG: CPT | Mod: CPTII,S$GLB,, | Performed by: OBSTETRICS & GYNECOLOGY

## 2021-03-04 PROCEDURE — 3074F PR MOST RECENT SYSTOLIC BLOOD PRESSURE < 130 MM HG: ICD-10-PCS | Mod: CPTII,S$GLB,, | Performed by: OBSTETRICS & GYNECOLOGY

## 2021-03-04 PROCEDURE — 99396 PR PREVENTIVE VISIT,EST,40-64: ICD-10-PCS | Mod: S$GLB,,, | Performed by: OBSTETRICS & GYNECOLOGY

## 2021-03-04 PROCEDURE — 3008F BODY MASS INDEX DOCD: CPT | Mod: CPTII,S$GLB,, | Performed by: OBSTETRICS & GYNECOLOGY

## 2021-03-04 PROCEDURE — 99999 PR PBB SHADOW E&M-EST. PATIENT-LVL III: CPT | Mod: PBBFAC,,, | Performed by: OBSTETRICS & GYNECOLOGY

## 2021-03-04 PROCEDURE — 3008F PR BODY MASS INDEX (BMI) DOCUMENTED: ICD-10-PCS | Mod: CPTII,S$GLB,, | Performed by: OBSTETRICS & GYNECOLOGY

## 2021-03-04 PROCEDURE — 3078F DIAST BP <80 MM HG: CPT | Mod: CPTII,S$GLB,, | Performed by: OBSTETRICS & GYNECOLOGY

## 2021-03-04 PROCEDURE — 1126F AMNT PAIN NOTED NONE PRSNT: CPT | Mod: S$GLB,,, | Performed by: OBSTETRICS & GYNECOLOGY

## 2021-03-04 PROCEDURE — 99396 PREV VISIT EST AGE 40-64: CPT | Mod: S$GLB,,, | Performed by: OBSTETRICS & GYNECOLOGY

## 2021-03-04 PROCEDURE — 99999 PR PBB SHADOW E&M-EST. PATIENT-LVL III: ICD-10-PCS | Mod: PBBFAC,,, | Performed by: OBSTETRICS & GYNECOLOGY

## 2021-03-31 ENCOUNTER — PATIENT MESSAGE (OUTPATIENT)
Dept: INTERNAL MEDICINE | Facility: CLINIC | Age: 56
End: 2021-03-31

## 2021-04-20 ENCOUNTER — PATIENT OUTREACH (OUTPATIENT)
Dept: ADMINISTRATIVE | Facility: OTHER | Age: 56
End: 2021-04-20

## 2021-04-21 ENCOUNTER — OFFICE VISIT (OUTPATIENT)
Dept: DERMATOLOGY | Facility: CLINIC | Age: 56
End: 2021-04-21
Payer: COMMERCIAL

## 2021-04-21 DIAGNOSIS — H02.64 XANTHELASMA OF LEFT UPPER EYELID: ICD-10-CM

## 2021-04-21 DIAGNOSIS — L73.8 SEBACEOUS HYPERPLASIA: ICD-10-CM

## 2021-04-21 DIAGNOSIS — L30.8 ASTEATOTIC ECZEMA: Primary | ICD-10-CM

## 2021-04-21 PROCEDURE — 1126F PR PAIN SEVERITY QUANTIFIED, NO PAIN PRESENT: ICD-10-PCS | Mod: S$GLB,,, | Performed by: DERMATOLOGY

## 2021-04-21 PROCEDURE — 99999 PR PBB SHADOW E&M-EST. PATIENT-LVL III: ICD-10-PCS | Mod: PBBFAC,,, | Performed by: DERMATOLOGY

## 2021-04-21 PROCEDURE — 99999 PR PBB SHADOW E&M-EST. PATIENT-LVL III: CPT | Mod: PBBFAC,,, | Performed by: DERMATOLOGY

## 2021-04-21 PROCEDURE — 1126F AMNT PAIN NOTED NONE PRSNT: CPT | Mod: S$GLB,,, | Performed by: DERMATOLOGY

## 2021-04-21 PROCEDURE — 99213 PR OFFICE/OUTPT VISIT, EST, LEVL III, 20-29 MIN: ICD-10-PCS | Mod: S$GLB,,, | Performed by: DERMATOLOGY

## 2021-04-21 PROCEDURE — 99213 OFFICE O/P EST LOW 20 MIN: CPT | Mod: S$GLB,,, | Performed by: DERMATOLOGY

## 2021-04-21 RX ORDER — TRIAMCINOLONE ACETONIDE 1 MG/G
CREAM TOPICAL
Qty: 45 G | Refills: 1 | Status: SHIPPED | OUTPATIENT
Start: 2021-04-21 | End: 2022-03-23

## 2021-08-17 ENCOUNTER — PATIENT MESSAGE (OUTPATIENT)
Dept: INTERNAL MEDICINE | Facility: CLINIC | Age: 56
End: 2021-08-17

## 2021-08-17 DIAGNOSIS — I10 ESSENTIAL HYPERTENSION: ICD-10-CM

## 2021-08-17 RX ORDER — VALSARTAN 80 MG/1
40 TABLET ORAL NIGHTLY
Qty: 90 TABLET | Refills: 0 | Status: SHIPPED | OUTPATIENT
Start: 2021-08-17 | End: 2021-10-04 | Stop reason: SDUPTHER

## 2021-09-16 ENCOUNTER — PATIENT MESSAGE (OUTPATIENT)
Dept: INTERNAL MEDICINE | Facility: CLINIC | Age: 56
End: 2021-09-16

## 2021-10-04 ENCOUNTER — LAB VISIT (OUTPATIENT)
Dept: LAB | Facility: HOSPITAL | Age: 56
End: 2021-10-04
Attending: INTERNAL MEDICINE
Payer: COMMERCIAL

## 2021-10-04 ENCOUNTER — OFFICE VISIT (OUTPATIENT)
Dept: INTERNAL MEDICINE | Facility: CLINIC | Age: 56
End: 2021-10-04
Payer: COMMERCIAL

## 2021-10-04 ENCOUNTER — IMMUNIZATION (OUTPATIENT)
Dept: INTERNAL MEDICINE | Facility: CLINIC | Age: 56
End: 2021-10-04
Payer: COMMERCIAL

## 2021-10-04 ENCOUNTER — PATIENT MESSAGE (OUTPATIENT)
Dept: PHARMACY | Facility: CLINIC | Age: 56
End: 2021-10-04

## 2021-10-04 VITALS
HEIGHT: 64 IN | HEART RATE: 75 BPM | WEIGHT: 127.88 LBS | BODY MASS INDEX: 21.83 KG/M2 | RESPIRATION RATE: 18 BRPM | SYSTOLIC BLOOD PRESSURE: 114 MMHG | OXYGEN SATURATION: 97 % | DIASTOLIC BLOOD PRESSURE: 70 MMHG

## 2021-10-04 DIAGNOSIS — Z00.00 ROUTINE GENERAL MEDICAL EXAMINATION AT A HEALTH CARE FACILITY: ICD-10-CM

## 2021-10-04 DIAGNOSIS — I10 ESSENTIAL HYPERTENSION: ICD-10-CM

## 2021-10-04 DIAGNOSIS — Z00.00 ROUTINE GENERAL MEDICAL EXAMINATION AT A HEALTH CARE FACILITY: Primary | ICD-10-CM

## 2021-10-04 LAB
ALBUMIN SERPL BCP-MCNC: 3.8 G/DL (ref 3.5–5.2)
ALP SERPL-CCNC: 90 U/L (ref 55–135)
ALT SERPL W/O P-5'-P-CCNC: 17 U/L (ref 10–44)
ANION GAP SERPL CALC-SCNC: 7 MMOL/L (ref 8–16)
AST SERPL-CCNC: 22 U/L (ref 10–40)
BASOPHILS # BLD AUTO: 0.03 K/UL (ref 0–0.2)
BASOPHILS NFR BLD: 0.6 % (ref 0–1.9)
BILIRUB SERPL-MCNC: 0.5 MG/DL (ref 0.1–1)
BUN SERPL-MCNC: 13 MG/DL (ref 6–20)
CALCIUM SERPL-MCNC: 9.5 MG/DL (ref 8.7–10.5)
CHLORIDE SERPL-SCNC: 107 MMOL/L (ref 95–110)
CHOLEST SERPL-MCNC: 224 MG/DL (ref 120–199)
CHOLEST/HDLC SERPL: 3 {RATIO} (ref 2–5)
CO2 SERPL-SCNC: 28 MMOL/L (ref 23–29)
CREAT SERPL-MCNC: 0.8 MG/DL (ref 0.5–1.4)
DIFFERENTIAL METHOD: ABNORMAL
EOSINOPHIL # BLD AUTO: 0.1 K/UL (ref 0–0.5)
EOSINOPHIL NFR BLD: 1 % (ref 0–8)
ERYTHROCYTE [DISTWIDTH] IN BLOOD BY AUTOMATED COUNT: 12.4 % (ref 11.5–14.5)
EST. GFR  (AFRICAN AMERICAN): >60 ML/MIN/1.73 M^2
EST. GFR  (NON AFRICAN AMERICAN): >60 ML/MIN/1.73 M^2
ESTIMATED AVG GLUCOSE: 103 MG/DL (ref 68–131)
GLUCOSE SERPL-MCNC: 94 MG/DL (ref 70–110)
HBA1C MFR BLD: 5.2 % (ref 4–5.6)
HCT VFR BLD AUTO: 39.6 % (ref 37–48.5)
HDLC SERPL-MCNC: 75 MG/DL (ref 40–75)
HDLC SERPL: 33.5 % (ref 20–50)
HGB BLD-MCNC: 13.4 G/DL (ref 12–16)
IMM GRANULOCYTES # BLD AUTO: 0.01 K/UL (ref 0–0.04)
IMM GRANULOCYTES NFR BLD AUTO: 0.2 % (ref 0–0.5)
LDLC SERPL CALC-MCNC: 134 MG/DL (ref 63–159)
LYMPHOCYTES # BLD AUTO: 1.6 K/UL (ref 1–4.8)
LYMPHOCYTES NFR BLD: 31.8 % (ref 18–48)
MCH RBC QN AUTO: 31.3 PG (ref 27–31)
MCHC RBC AUTO-ENTMCNC: 33.8 G/DL (ref 32–36)
MCV RBC AUTO: 93 FL (ref 82–98)
MONOCYTES # BLD AUTO: 0.4 K/UL (ref 0.3–1)
MONOCYTES NFR BLD: 7.9 % (ref 4–15)
NEUTROPHILS # BLD AUTO: 3 K/UL (ref 1.8–7.7)
NEUTROPHILS NFR BLD: 58.5 % (ref 38–73)
NONHDLC SERPL-MCNC: 149 MG/DL
NRBC BLD-RTO: 0 /100 WBC
PLATELET # BLD AUTO: 258 K/UL (ref 150–450)
PMV BLD AUTO: 10.7 FL (ref 9.2–12.9)
POTASSIUM SERPL-SCNC: 4.2 MMOL/L (ref 3.5–5.1)
PROT SERPL-MCNC: 6.5 G/DL (ref 6–8.4)
RBC # BLD AUTO: 4.28 M/UL (ref 4–5.4)
SODIUM SERPL-SCNC: 142 MMOL/L (ref 136–145)
TRIGL SERPL-MCNC: 75 MG/DL (ref 30–150)
WBC # BLD AUTO: 5.09 K/UL (ref 3.9–12.7)

## 2021-10-04 PROCEDURE — 80061 LIPID PANEL: CPT | Performed by: INTERNAL MEDICINE

## 2021-10-04 PROCEDURE — 99396 PR PREVENTIVE VISIT,EST,40-64: ICD-10-PCS | Mod: 25,S$GLB,, | Performed by: INTERNAL MEDICINE

## 2021-10-04 PROCEDURE — 99999 PR PBB SHADOW E&M-EST. PATIENT-LVL III: CPT | Mod: PBBFAC,,, | Performed by: INTERNAL MEDICINE

## 2021-10-04 PROCEDURE — 90686 FLU VACCINE (QUAD) GREATER THAN OR EQUAL TO 3YO PRESERVATIVE FREE IM: ICD-10-PCS | Mod: S$GLB,,, | Performed by: INTERNAL MEDICINE

## 2021-10-04 PROCEDURE — 1159F PR MEDICATION LIST DOCUMENTED IN MEDICAL RECORD: ICD-10-PCS | Mod: CPTII,S$GLB,, | Performed by: INTERNAL MEDICINE

## 2021-10-04 PROCEDURE — 3078F PR MOST RECENT DIASTOLIC BLOOD PRESSURE < 80 MM HG: ICD-10-PCS | Mod: CPTII,S$GLB,, | Performed by: INTERNAL MEDICINE

## 2021-10-04 PROCEDURE — 3008F PR BODY MASS INDEX (BMI) DOCUMENTED: ICD-10-PCS | Mod: CPTII,S$GLB,, | Performed by: INTERNAL MEDICINE

## 2021-10-04 PROCEDURE — 36415 COLL VENOUS BLD VENIPUNCTURE: CPT | Performed by: INTERNAL MEDICINE

## 2021-10-04 PROCEDURE — 3074F SYST BP LT 130 MM HG: CPT | Mod: CPTII,S$GLB,, | Performed by: INTERNAL MEDICINE

## 2021-10-04 PROCEDURE — 90471 FLU VACCINE (QUAD) GREATER THAN OR EQUAL TO 3YO PRESERVATIVE FREE IM: ICD-10-PCS | Mod: S$GLB,,, | Performed by: INTERNAL MEDICINE

## 2021-10-04 PROCEDURE — 1160F PR REVIEW ALL MEDS BY PRESCRIBER/CLIN PHARMACIST DOCUMENTED: ICD-10-PCS | Mod: CPTII,S$GLB,, | Performed by: INTERNAL MEDICINE

## 2021-10-04 PROCEDURE — 4010F ACE/ARB THERAPY RXD/TAKEN: CPT | Mod: CPTII,S$GLB,, | Performed by: INTERNAL MEDICINE

## 2021-10-04 PROCEDURE — 4010F PR ACE/ARB THEARPY RXD/TAKEN: ICD-10-PCS | Mod: CPTII,S$GLB,, | Performed by: INTERNAL MEDICINE

## 2021-10-04 PROCEDURE — 90686 IIV4 VACC NO PRSV 0.5 ML IM: CPT | Mod: S$GLB,,, | Performed by: INTERNAL MEDICINE

## 2021-10-04 PROCEDURE — 3074F PR MOST RECENT SYSTOLIC BLOOD PRESSURE < 130 MM HG: ICD-10-PCS | Mod: CPTII,S$GLB,, | Performed by: INTERNAL MEDICINE

## 2021-10-04 PROCEDURE — 1159F MED LIST DOCD IN RCRD: CPT | Mod: CPTII,S$GLB,, | Performed by: INTERNAL MEDICINE

## 2021-10-04 PROCEDURE — 3078F DIAST BP <80 MM HG: CPT | Mod: CPTII,S$GLB,, | Performed by: INTERNAL MEDICINE

## 2021-10-04 PROCEDURE — 85025 COMPLETE CBC W/AUTO DIFF WBC: CPT | Performed by: INTERNAL MEDICINE

## 2021-10-04 PROCEDURE — 83036 HEMOGLOBIN GLYCOSYLATED A1C: CPT | Performed by: INTERNAL MEDICINE

## 2021-10-04 PROCEDURE — 80053 COMPREHEN METABOLIC PANEL: CPT | Performed by: INTERNAL MEDICINE

## 2021-10-04 PROCEDURE — 3008F BODY MASS INDEX DOCD: CPT | Mod: CPTII,S$GLB,, | Performed by: INTERNAL MEDICINE

## 2021-10-04 PROCEDURE — 99999 PR PBB SHADOW E&M-EST. PATIENT-LVL III: ICD-10-PCS | Mod: PBBFAC,,, | Performed by: INTERNAL MEDICINE

## 2021-10-04 PROCEDURE — 1160F RVW MEDS BY RX/DR IN RCRD: CPT | Mod: CPTII,S$GLB,, | Performed by: INTERNAL MEDICINE

## 2021-10-04 PROCEDURE — 90471 IMMUNIZATION ADMIN: CPT | Mod: S$GLB,,, | Performed by: INTERNAL MEDICINE

## 2021-10-04 PROCEDURE — 99396 PREV VISIT EST AGE 40-64: CPT | Mod: 25,S$GLB,, | Performed by: INTERNAL MEDICINE

## 2021-10-04 RX ORDER — VALSARTAN 80 MG/1
40 TABLET ORAL NIGHTLY
Qty: 90 TABLET | Refills: 11 | Status: SHIPPED | OUTPATIENT
Start: 2021-10-04 | End: 2022-01-10

## 2021-11-18 DIAGNOSIS — Z12.11 SPECIAL SCREENING FOR MALIGNANT NEOPLASMS, COLON: Primary | ICD-10-CM

## 2021-11-18 RX ORDER — SODIUM, POTASSIUM,MAG SULFATES 17.5-3.13G
1 SOLUTION, RECONSTITUTED, ORAL ORAL DAILY
Qty: 1 KIT | Refills: 0 | Status: SHIPPED | OUTPATIENT
Start: 2021-11-18 | End: 2021-11-20

## 2021-12-03 ENCOUNTER — OFFICE VISIT (OUTPATIENT)
Dept: PSYCHIATRY | Facility: CLINIC | Age: 56
End: 2021-12-03
Payer: COMMERCIAL

## 2021-12-03 DIAGNOSIS — F43.20 ADJUSTMENT DISORDER, UNSPECIFIED TYPE: Primary | ICD-10-CM

## 2021-12-03 PROCEDURE — 4010F ACE/ARB THERAPY RXD/TAKEN: CPT | Mod: CPTII,S$GLB,, | Performed by: SOCIAL WORKER

## 2021-12-03 PROCEDURE — 90791 PSYCH DIAGNOSTIC EVALUATION: CPT | Mod: S$GLB,,, | Performed by: SOCIAL WORKER

## 2021-12-03 PROCEDURE — 4010F PR ACE/ARB THEARPY RXD/TAKEN: ICD-10-PCS | Mod: CPTII,S$GLB,, | Performed by: SOCIAL WORKER

## 2021-12-03 PROCEDURE — 90791 PR PSYCHIATRIC DIAGNOSTIC EVALUATION: ICD-10-PCS | Mod: S$GLB,,, | Performed by: SOCIAL WORKER

## 2021-12-08 ENCOUNTER — OFFICE VISIT (OUTPATIENT)
Dept: PSYCHIATRY | Facility: CLINIC | Age: 56
End: 2021-12-08
Payer: COMMERCIAL

## 2021-12-08 DIAGNOSIS — F43.20 ADJUSTMENT DISORDER, UNSPECIFIED TYPE: Primary | ICD-10-CM

## 2021-12-08 PROCEDURE — 4010F ACE/ARB THERAPY RXD/TAKEN: CPT | Mod: CPTII,S$GLB,, | Performed by: SOCIAL WORKER

## 2021-12-08 PROCEDURE — 90834 PSYTX W PT 45 MINUTES: CPT | Mod: S$GLB,,, | Performed by: SOCIAL WORKER

## 2021-12-08 PROCEDURE — 4010F PR ACE/ARB THEARPY RXD/TAKEN: ICD-10-PCS | Mod: CPTII,S$GLB,, | Performed by: SOCIAL WORKER

## 2021-12-08 PROCEDURE — 90834 PR PSYCHOTHERAPY W/PATIENT, 45 MIN: ICD-10-PCS | Mod: S$GLB,,, | Performed by: SOCIAL WORKER

## 2021-12-17 ENCOUNTER — OFFICE VISIT (OUTPATIENT)
Dept: PSYCHIATRY | Facility: CLINIC | Age: 56
End: 2021-12-17
Payer: COMMERCIAL

## 2021-12-17 DIAGNOSIS — F43.20 ADJUSTMENT DISORDER, UNSPECIFIED TYPE: Primary | ICD-10-CM

## 2021-12-17 PROCEDURE — 4010F ACE/ARB THERAPY RXD/TAKEN: CPT | Mod: CPTII,S$GLB,, | Performed by: SOCIAL WORKER

## 2021-12-17 PROCEDURE — 4010F PR ACE/ARB THEARPY RXD/TAKEN: ICD-10-PCS | Mod: CPTII,S$GLB,, | Performed by: SOCIAL WORKER

## 2021-12-17 PROCEDURE — 90834 PSYTX W PT 45 MINUTES: CPT | Mod: S$GLB,,, | Performed by: SOCIAL WORKER

## 2021-12-17 PROCEDURE — 90834 PR PSYCHOTHERAPY W/PATIENT, 45 MIN: ICD-10-PCS | Mod: S$GLB,,, | Performed by: SOCIAL WORKER

## 2021-12-22 ENCOUNTER — OFFICE VISIT (OUTPATIENT)
Dept: PSYCHIATRY | Facility: CLINIC | Age: 56
End: 2021-12-22
Payer: COMMERCIAL

## 2021-12-22 DIAGNOSIS — F43.20 ADJUSTMENT DISORDER, UNSPECIFIED TYPE: Primary | ICD-10-CM

## 2021-12-22 PROCEDURE — 4010F PR ACE/ARB THEARPY RXD/TAKEN: ICD-10-PCS | Mod: CPTII,S$GLB,, | Performed by: SOCIAL WORKER

## 2021-12-22 PROCEDURE — 3044F HG A1C LEVEL LT 7.0%: CPT | Mod: CPTII,S$GLB,, | Performed by: SOCIAL WORKER

## 2021-12-22 PROCEDURE — 90834 PR PSYCHOTHERAPY W/PATIENT, 45 MIN: ICD-10-PCS | Mod: S$GLB,,, | Performed by: SOCIAL WORKER

## 2021-12-22 PROCEDURE — 3044F PR MOST RECENT HEMOGLOBIN A1C LEVEL <7.0%: ICD-10-PCS | Mod: CPTII,S$GLB,, | Performed by: SOCIAL WORKER

## 2021-12-22 PROCEDURE — 90834 PSYTX W PT 45 MINUTES: CPT | Mod: S$GLB,,, | Performed by: SOCIAL WORKER

## 2021-12-22 PROCEDURE — 4010F ACE/ARB THERAPY RXD/TAKEN: CPT | Mod: CPTII,S$GLB,, | Performed by: SOCIAL WORKER

## 2022-01-06 ENCOUNTER — PATIENT MESSAGE (OUTPATIENT)
Dept: PSYCHIATRY | Facility: CLINIC | Age: 57
End: 2022-01-06
Payer: COMMERCIAL

## 2022-01-06 DIAGNOSIS — Z01.818 PRE-OP TESTING: ICD-10-CM

## 2022-01-10 ENCOUNTER — PATIENT MESSAGE (OUTPATIENT)
Dept: INTERNAL MEDICINE | Facility: CLINIC | Age: 57
End: 2022-01-10
Payer: COMMERCIAL

## 2022-01-10 ENCOUNTER — PATIENT MESSAGE (OUTPATIENT)
Dept: OTHER | Facility: OTHER | Age: 57
End: 2022-01-10
Payer: COMMERCIAL

## 2022-01-12 ENCOUNTER — PATIENT MESSAGE (OUTPATIENT)
Dept: PSYCHIATRY | Facility: CLINIC | Age: 57
End: 2022-01-12
Payer: COMMERCIAL

## 2022-01-18 ENCOUNTER — OFFICE VISIT (OUTPATIENT)
Dept: PSYCHIATRY | Facility: CLINIC | Age: 57
End: 2022-01-18
Payer: COMMERCIAL

## 2022-01-18 ENCOUNTER — PATIENT MESSAGE (OUTPATIENT)
Dept: PSYCHIATRY | Facility: CLINIC | Age: 57
End: 2022-01-18
Payer: COMMERCIAL

## 2022-01-18 DIAGNOSIS — F43.20 ADJUSTMENT DISORDER, UNSPECIFIED TYPE: Primary | ICD-10-CM

## 2022-01-18 PROCEDURE — 90834 PSYTX W PT 45 MINUTES: CPT | Mod: 95,,, | Performed by: SOCIAL WORKER

## 2022-01-18 PROCEDURE — 90834 PR PSYCHOTHERAPY W/PATIENT, 45 MIN: ICD-10-PCS | Mod: 95,,, | Performed by: SOCIAL WORKER

## 2022-01-18 PROCEDURE — 4010F ACE/ARB THERAPY RXD/TAKEN: CPT | Mod: CPTII,95,, | Performed by: SOCIAL WORKER

## 2022-01-18 PROCEDURE — 4010F PR ACE/ARB THEARPY RXD/TAKEN: ICD-10-PCS | Mod: CPTII,95,, | Performed by: SOCIAL WORKER

## 2022-01-18 NOTE — PROGRESS NOTES
Individual Psychotherapy (PhD/LCSW)    1/18/2022     The patient location is: [place], [place]  The chief complaint leading to consultation is: adjustment disorder    Visit type: audiovisual    Face to Face time with patient: 45 minutes  60 minutes of total time spent on the encounter, which includes face to face time and non-face to face time preparing to see the patient (eg, review of tests), Obtaining and/or reviewing separately obtained history, Documenting clinical information in the electronic or other health record, Independently interpreting results (not separately reported) and communicating results to the patient/family/caregiver, or Care coordination (not separately reported).         Each patient to whom he or she provides medical services by telemedicine is:  (1) informed of the relationship between the physician and patient and the respective role of any other health care provider with respect to management of the patient; and (2) notified that he or she may decline to receive medical services by telemedicine and may withdraw from such care at any time.      Site:  [facility]         Therapeutic Intervention: Met with patient.  Outpatient - Behavior modifying psychotherapy 45 min - CPT code 54888, Outpatient - Supportive psychotherapy 45 min - CPT Code 74792 and Outpatient - Interactive psychotherapy 45 min - CPT code 37572    Chief complaint/reason for encounter: interpersonal     Interval history and content of current session: Pt is a 56 year-old  white female who presents this morning for a follow up therapy session. Pt reports that she is considering separation with her  as she continues to reports feeling disengaged. Pt reports that they spend time together, however pt reports that they are not emotionally engaging. Pt continues to express concerns regarding 's mismanagement of their finances. Pt has spoken to her  about her feelings however, per pt's report, he  is not on the same page. Pt states that  reports full support regarding pt's decision. Clinician provided validation and active listening. Pt appropriately tearful and reports that sleep has been poor due to situation. Pt also reports that she contracted COVID about two weeks ago and continues to struggle with the coughing and the fatigue. Pt getting support from mother and sister and plans on continuing to have more conversations with  in the next few weeks.     Treatment plan:  · Target symptoms: anxiety , intepersonal issues  · Why chosen therapy is appropriate versus another modality: relevant to diagnosis, patient responds to this modality, evidence based practice  · Outcome monitoring methods: self-report, observation  · Therapeutic intervention type: insight oriented psychotherapy, behavior modifying psychotherapy, supportive psychotherapy    Risk parameters:  Patient reports no suicidal ideation  Patient reports no homicidal ideation  Patient reports no self-injurious behavior  Patient reports no violent behavior    Verbal deficits: None    Patient's response to intervention:  The patient's response to intervention is accepting.    Progress toward goals and other mental status changes:  The patient's progress toward goals is excellent.    Diagnosis:     ICD-10-CM ICD-9-CM   1. Adjustment disorder, unspecified type  F43.20 309.9       Plan:  individual psychotherapy    Return to clinic: 1 week    Length of Service (minutes): 45

## 2022-01-23 ENCOUNTER — PATIENT MESSAGE (OUTPATIENT)
Dept: PSYCHIATRY | Facility: CLINIC | Age: 57
End: 2022-01-23
Payer: COMMERCIAL

## 2022-01-24 ENCOUNTER — HOSPITAL ENCOUNTER (OUTPATIENT)
Dept: RADIOLOGY | Facility: OTHER | Age: 57
Discharge: HOME | End: 2022-01-24
Attending: OBSTETRICS & GYNECOLOGY
Payer: COMMERCIAL

## 2022-01-24 ENCOUNTER — PATIENT MESSAGE (OUTPATIENT)
Dept: PSYCHIATRY | Facility: CLINIC | Age: 57
End: 2022-01-24
Payer: COMMERCIAL

## 2022-01-24 DIAGNOSIS — Z12.31 ENCOUNTER FOR SCREENING MAMMOGRAM FOR BREAST CANCER: ICD-10-CM

## 2022-01-24 PROCEDURE — 77063 BREAST TOMOSYNTHESIS BI: CPT | Mod: TC

## 2022-01-24 PROCEDURE — 77063 MAMMO DIGITAL SCREENING BILAT WITH TOMO: ICD-10-PCS | Mod: 26,,, | Performed by: RADIOLOGY

## 2022-01-24 PROCEDURE — 77063 BREAST TOMOSYNTHESIS BI: CPT | Mod: 26,,, | Performed by: RADIOLOGY

## 2022-01-24 PROCEDURE — 77067 MAMMO DIGITAL SCREENING BILAT WITH TOMO: ICD-10-PCS | Mod: 26,,, | Performed by: RADIOLOGY

## 2022-01-24 PROCEDURE — 77067 SCR MAMMO BI INCL CAD: CPT | Mod: TC

## 2022-01-24 PROCEDURE — 77067 SCR MAMMO BI INCL CAD: CPT | Mod: 26,,, | Performed by: RADIOLOGY

## 2022-01-31 ENCOUNTER — PATIENT MESSAGE (OUTPATIENT)
Dept: PSYCHIATRY | Facility: CLINIC | Age: 57
End: 2022-01-31
Payer: COMMERCIAL

## 2022-02-01 ENCOUNTER — HOSPITAL ENCOUNTER (OUTPATIENT)
Dept: PREADMISSION TESTING | Facility: OTHER | Age: 57
Discharge: HOME | End: 2022-02-01
Attending: ANESTHESIOLOGY
Payer: COMMERCIAL

## 2022-02-01 ENCOUNTER — OFFICE VISIT (OUTPATIENT)
Dept: PSYCHIATRY | Facility: CLINIC | Age: 57
End: 2022-02-01
Payer: COMMERCIAL

## 2022-02-01 DIAGNOSIS — Z01.818 PRE-OP TESTING: ICD-10-CM

## 2022-02-01 DIAGNOSIS — F43.20 ADJUSTMENT DISORDER, UNSPECIFIED TYPE: Primary | ICD-10-CM

## 2022-02-01 LAB
SARS-COV-2 RNA RESP QL NAA+PROBE: DETECTED
SARS-COV-2- CYCLE NUMBER: 41

## 2022-02-01 PROCEDURE — 4010F PR ACE/ARB THEARPY RXD/TAKEN: ICD-10-PCS | Mod: CPTII,S$GLB,, | Performed by: SOCIAL WORKER

## 2022-02-01 PROCEDURE — 4010F ACE/ARB THERAPY RXD/TAKEN: CPT | Mod: CPTII,S$GLB,, | Performed by: SOCIAL WORKER

## 2022-02-01 PROCEDURE — 90834 PR PSYCHOTHERAPY W/PATIENT, 45 MIN: ICD-10-PCS | Mod: S$GLB,,, | Performed by: SOCIAL WORKER

## 2022-02-01 PROCEDURE — 90834 PSYTX W PT 45 MINUTES: CPT | Mod: S$GLB,,, | Performed by: SOCIAL WORKER

## 2022-02-01 PROCEDURE — U0005 INFEC AGEN DETEC AMPLI PROBE: HCPCS | Performed by: FAMILY MEDICINE

## 2022-02-01 PROCEDURE — U0003 INFECTIOUS AGENT DETECTION BY NUCLEIC ACID (DNA OR RNA); SEVERE ACUTE RESPIRATORY SYNDROME CORONAVIRUS 2 (SARS-COV-2) (CORONAVIRUS DISEASE [COVID-19]), AMPLIFIED PROBE TECHNIQUE, MAKING USE OF HIGH THROUGHPUT TECHNOLOGIES AS DESCRIBED BY CMS-2020-01-R: HCPCS | Performed by: FAMILY MEDICINE

## 2022-02-02 ENCOUNTER — PATIENT MESSAGE (OUTPATIENT)
Dept: GASTROENTEROLOGY | Facility: CLINIC | Age: 57
End: 2022-02-02
Payer: COMMERCIAL

## 2022-02-02 NOTE — PROGRESS NOTES
Individual Psychotherapy (PhD/LCSW)    2/1/2022    Site:  [facility]         Therapeutic Intervention: Met with patient.  Outpatient - Behavior modifying psychotherapy 45 min - CPT code 46552, Outpatient - Supportive psychotherapy 45 min - CPT Code 39288 and Outpatient - Interactive psychotherapy 45 min - CPT code 39007    Chief complaint/reason for encounter: interpersonal     Interval history and content of current session: Pt is a 56 year-old  white female who presents this morning for a follow up therapy session. Pt states that she and her  have made the decision to formally separate for now. Pt reports that  likely moving out and into his own apartment nearby at the end of this week. Pt expressing a mix of emotions, and reports that she and  were successfully able to notify both of their children. Clinician provided active listening and validation in order to provide a safe space for pt to process. Pt expressing some sadness at one point due to the fact that separation/divorce was not her plan, however pt also able to acknowledge that she is feeling lighter since speaking up to her . Pt reports that she continues to feel disappointed when  is unreliable. She reports that she has tried to set clear boundaries, however he does not, per her report, honor them, even in their final week of living together. Pt has again insisted that  engage in his own therapy along with couples, however  continues to express that he is not interested. Pt appears to show good insight into the need to take things slow as she goes through the motions. Clinician to continue using supportive therapy to assist pt during this time.     Treatment plan:  · Target symptoms: anxiety , interpersonal issues  · Why chosen therapy is appropriate versus another modality: relevant to diagnosis, patient responds to this modality, evidence based practice  · Outcome monitoring methods:  self-report, observation  · Therapeutic intervention type: insight oriented psychotherapy, behavior modifying psychotherapy, supportive psychotherapy    Risk parameters:  Patient reports no suicidal ideation  Patient reports no homicidal ideation  Patient reports no self-injurious behavior  Patient reports no violent behavior    Verbal deficits: None    Patient's response to intervention:  The patient's response to intervention is accepting.    Progress toward goals and other mental status changes:  The patient's progress toward goals is excellent.    Diagnosis:     ICD-10-CM ICD-9-CM   1. Adjustment disorder, unspecified type  F43.20 309.9       Plan:  individual psychotherapy    Return to clinic: 1 week    Length of Service (minutes): 45

## 2022-02-08 ENCOUNTER — OFFICE VISIT (OUTPATIENT)
Dept: PSYCHIATRY | Facility: CLINIC | Age: 57
End: 2022-02-08
Payer: COMMERCIAL

## 2022-02-08 DIAGNOSIS — F43.20 ADJUSTMENT DISORDER, UNSPECIFIED TYPE: Primary | ICD-10-CM

## 2022-02-08 PROCEDURE — 4010F ACE/ARB THERAPY RXD/TAKEN: CPT | Mod: CPTII,S$GLB,, | Performed by: SOCIAL WORKER

## 2022-02-08 PROCEDURE — 90834 PR PSYCHOTHERAPY W/PATIENT, 45 MIN: ICD-10-PCS | Mod: S$GLB,,, | Performed by: SOCIAL WORKER

## 2022-02-08 PROCEDURE — 4010F PR ACE/ARB THEARPY RXD/TAKEN: ICD-10-PCS | Mod: CPTII,S$GLB,, | Performed by: SOCIAL WORKER

## 2022-02-08 PROCEDURE — 90834 PSYTX W PT 45 MINUTES: CPT | Mod: S$GLB,,, | Performed by: SOCIAL WORKER

## 2022-02-08 NOTE — PROGRESS NOTES
Individual Psychotherapy (PhD/LCSW)    2/8/2022    Site:  [facility]         Therapeutic Intervention: Met with patient.  Outpatient - Behavior modifying psychotherapy 45 min - CPT code 26552, Outpatient - Supportive psychotherapy 45 min - CPT Code 20415 and Outpatient - Interactive psychotherapy 45 min - CPT code 14881    Chief complaint/reason for encounter: interpersonal     Interval history and content of current session: Pt is a 56 year-old  white female who presents this morning for a follow up therapy session. Pt reports that  moved into his own apartment yesterday. Pt expressing sadness related to the adjustment. Clinician provided active listening and support. Pt reports that she is making some plans with friends and utilizing her mother for support. Pt reports that she continues to remain open to working things out with  if he makes the choice to actively pursue therapy. Pt is struggling with limited structure during the week as pt is not currently working. 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 Pt actively challenging fear around people finding out about the separation and recognizes the benefits versus risks of opening up to others. Pt describes setting clear boundaries with  around ground rules for living separately. Clinician provided validation around pt's management of the situation, especially as pt has not lived alone for 25+ years. Clinician to continue providing pt with a safe space to process.     Treatment plan:  · Target symptoms: anxiety , interpersonal issues  · Why chosen therapy is appropriate versus another modality: relevant to diagnosis, patient responds to this modality, evidence based practice  · Outcome monitoring methods: self-report, observation  · Therapeutic intervention type: insight oriented psychotherapy, behavior modifying psychotherapy, supportive psychotherapy    Risk  parameters:  Patient reports no suicidal ideation  Patient reports no homicidal ideation  Patient reports no self-injurious behavior  Patient reports no violent behavior    Verbal deficits: None    Patient's response to intervention:  The patient's response to intervention is accepting.    Progress toward goals and other mental status changes:  The patient's progress toward goals is excellent.    Diagnosis:     ICD-10-CM ICD-9-CM   1. Adjustment disorder, unspecified type  F43.20 309.9       Plan:  individual psychotherapy    Return to clinic: 1 week    Length of Service (minutes): 45

## 2022-02-15 ENCOUNTER — TELEPHONE (OUTPATIENT)
Dept: OBSTETRICS AND GYNECOLOGY | Facility: CLINIC | Age: 57
End: 2022-02-15
Payer: COMMERCIAL

## 2022-02-15 ENCOUNTER — OFFICE VISIT (OUTPATIENT)
Dept: PSYCHIATRY | Facility: CLINIC | Age: 57
End: 2022-02-15
Payer: COMMERCIAL

## 2022-02-15 DIAGNOSIS — F43.20 ADJUSTMENT DISORDER, UNSPECIFIED TYPE: Primary | ICD-10-CM

## 2022-02-15 PROCEDURE — 4010F ACE/ARB THERAPY RXD/TAKEN: CPT | Mod: CPTII,S$GLB,, | Performed by: SOCIAL WORKER

## 2022-02-15 PROCEDURE — 90834 PSYTX W PT 45 MINUTES: CPT | Mod: S$GLB,,, | Performed by: SOCIAL WORKER

## 2022-02-15 PROCEDURE — 4010F PR ACE/ARB THEARPY RXD/TAKEN: ICD-10-PCS | Mod: CPTII,S$GLB,, | Performed by: SOCIAL WORKER

## 2022-02-15 PROCEDURE — 90834 PR PSYCHOTHERAPY W/PATIENT, 45 MIN: ICD-10-PCS | Mod: S$GLB,,, | Performed by: SOCIAL WORKER

## 2022-02-15 NOTE — PROGRESS NOTES
Individual Psychotherapy (PhD/LCSW)    2/15/2022    Site:  [facility]         Therapeutic Intervention: Met with patient.  Outpatient - Behavior modifying psychotherapy 45 min - CPT code 89679, Outpatient - Supportive psychotherapy 45 min - CPT Code 88886 and Outpatient - Interactive psychotherapy 45 min - CPT code 92305    Chief complaint/reason for encounter: interpersonal     Interval history and content of current session: Pt is a 56 year-old  white female who presents this morning for a follow up therapy session. Pt reports that she is managing as best as she can since  moved out a week okay. Pt reports that the hardest struggle has been controlling ruminations about what  might be doing. Pt reports that she has asked her  to share the finances as he was primarily the person managing them throughout the marriage. Pt reports concerns that he won't be fully transparent but still plans on setting boundaries around her wishes. Pt reports that she has done some socializing with friends and plans on telling a few close friends about the separation. Pt reports that she and  are still spending some time together during the week as they navigate through this change in their relationship. Pt states that she hasn't responded with immediacy to his text messages as a way of gaining some space and re-centering herself. Pt reports that she is looking forward to her children coming home for [employer] [place] despite the separation. Clinician provided validation around pt's conflicted feelings about her marriage and reminded pt to have patience with herself as the separation is still very fresh.     Treatment plan:  · Target symptoms: anxiety , interpersonal issues  · Why chosen therapy is appropriate versus another modality: relevant to diagnosis, patient responds to this modality, evidence based practice  · Outcome monitoring methods: self-report, observation  · Therapeutic intervention  type: insight oriented psychotherapy, behavior modifying psychotherapy, supportive psychotherapy    Risk parameters:  Patient reports no suicidal ideation  Patient reports no homicidal ideation  Patient reports no self-injurious behavior  Patient reports no violent behavior    Verbal deficits: None    Patient's response to intervention:  The patient's response to intervention is accepting.    Progress toward goals and other mental status changes:  The patient's progress toward goals is excellent.    Diagnosis:     ICD-10-CM ICD-9-CM   1. Adjustment disorder, unspecified type  F43.20 309.9       Plan:  individual psychotherapy    Return to clinic: 1 week    Length of Service (minutes): 45

## 2022-02-15 NOTE — TELEPHONE ENCOUNTER
Returned call to the patient. Patient agreed to a [facility] appointment with Dr. Lopez on 3/22 at 10:45am at [facility].

## 2022-02-15 NOTE — TELEPHONE ENCOUNTER
----- Message from Micaela Dumont sent at 2/15/2022  8:36 AM CST -----  Contact: pt  Type:  Needs Medical Advice    Who Called: pt  Symptoms (please be specific):    How long has patient had these symptoms:    Pharmacy name and phone #:    Would the patient rather a call back or a response via [facility]? call  Best Call Back Number: [phone]  Additional Information: to set up appt

## 2022-02-23 ENCOUNTER — OFFICE VISIT (OUTPATIENT)
Dept: PSYCHIATRY | Facility: CLINIC | Age: 57
End: 2022-02-23
Payer: COMMERCIAL

## 2022-02-23 DIAGNOSIS — F43.20 ADJUSTMENT DISORDER, UNSPECIFIED TYPE: Primary | ICD-10-CM

## 2022-02-23 PROCEDURE — 90834 PR PSYCHOTHERAPY W/PATIENT, 45 MIN: ICD-10-PCS | Mod: S$GLB,,, | Performed by: SOCIAL WORKER

## 2022-02-23 PROCEDURE — 4010F PR ACE/ARB THEARPY RXD/TAKEN: ICD-10-PCS | Mod: CPTII,S$GLB,, | Performed by: SOCIAL WORKER

## 2022-02-23 PROCEDURE — 90834 PSYTX W PT 45 MINUTES: CPT | Mod: S$GLB,,, | Performed by: SOCIAL WORKER

## 2022-02-23 PROCEDURE — 4010F ACE/ARB THERAPY RXD/TAKEN: CPT | Mod: CPTII,S$GLB,, | Performed by: SOCIAL WORKER
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
## 2022-02-26 ENCOUNTER — PATIENT MESSAGE (OUTPATIENT)
Dept: ADMINISTRATIVE | Facility: OTHER | Age: 57
End: 2022-02-26
Payer: COMMERCIAL

## 2022-03-02 ENCOUNTER — OFFICE VISIT (OUTPATIENT)
Dept: PSYCHIATRY | Facility: CLINIC | Age: 57
End: 2022-03-02
Payer: COMMERCIAL

## 2022-03-02 DIAGNOSIS — F43.20 ADJUSTMENT DISORDER, UNSPECIFIED TYPE: Primary | ICD-10-CM

## 2022-03-02 PROCEDURE — 90834 PSYTX W PT 45 MINUTES: CPT | Mod: S$GLB,,, | Performed by: SOCIAL WORKER

## 2022-03-02 PROCEDURE — 4010F ACE/ARB THERAPY RXD/TAKEN: CPT | Mod: CPTII,S$GLB,, | Performed by: SOCIAL WORKER

## 2022-03-02 PROCEDURE — 4010F PR ACE/ARB THEARPY RXD/TAKEN: ICD-10-PCS | Mod: CPTII,S$GLB,, | Performed by: SOCIAL WORKER

## 2022-03-02 PROCEDURE — 90834 PR PSYCHOTHERAPY W/PATIENT, 45 MIN: ICD-10-PCS | Mod: S$GLB,,, | Performed by: SOCIAL WORKER
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
## 2022-03-04 ENCOUNTER — ANESTHESIA EVENT (OUTPATIENT)
Dept: ENDOSCOPY | Facility: HOSPITAL | Age: 57
End: 2022-03-04
Payer: COMMERCIAL

## 2022-03-04 ENCOUNTER — ANESTHESIA (OUTPATIENT)
Dept: ENDOSCOPY | Facility: HOSPITAL | Age: 57
End: 2022-03-04
Payer: COMMERCIAL

## 2022-03-04 ENCOUNTER — HOSPITAL ENCOUNTER (OUTPATIENT)
Facility: HOSPITAL | Age: 57
Discharge: HOME | End: 2022-03-04
Attending: INTERNAL MEDICINE | Admitting: INTERNAL MEDICINE
Payer: COMMERCIAL

## 2022-03-04 VITALS
TEMPERATURE: 97 F | BODY MASS INDEX: 19.29 KG/M2 | RESPIRATION RATE: 31 BRPM | DIASTOLIC BLOOD PRESSURE: 72 MMHG | HEART RATE: 57 BPM | HEIGHT: 64 IN | SYSTOLIC BLOOD PRESSURE: 134 MMHG | WEIGHT: 113 LBS | OXYGEN SATURATION: 100 %

## 2022-03-04 DIAGNOSIS — Z86.010 HISTORY OF COLON POLYPS: ICD-10-CM

## 2022-03-04 PROCEDURE — D9220A PRA ANESTHESIA: Mod: CRNA,,, | Performed by: NURSE ANESTHETIST, CERTIFIED REGISTERED

## 2022-03-04 PROCEDURE — 25000003 PHARM REV CODE 250: Performed by: NURSE ANESTHETIST, CERTIFIED REGISTERED

## 2022-03-04 PROCEDURE — D9220A PRA ANESTHESIA: Mod: ANES,,, | Performed by: ANESTHESIOLOGY

## 2022-03-04 PROCEDURE — G0105 COLORECTAL SCRN; HI RISK IND: ICD-10-PCS | Mod: ,,, | Performed by: INTERNAL MEDICINE

## 2022-03-04 PROCEDURE — D9220A PRA ANESTHESIA: ICD-10-PCS | Mod: ANES,,, | Performed by: ANESTHESIOLOGY

## 2022-03-04 PROCEDURE — G0105 COLORECTAL SCRN; HI RISK IND: HCPCS | Mod: ,,, | Performed by: INTERNAL MEDICINE

## 2022-03-04 PROCEDURE — 63600175 PHARM REV CODE 636 W HCPCS: Performed by: NURSE ANESTHETIST, CERTIFIED REGISTERED

## 2022-03-04 PROCEDURE — 37000009 HC ANESTHESIA EA ADD 15 MINS: Performed by: INTERNAL MEDICINE

## 2022-03-04 PROCEDURE — 37000008 HC ANESTHESIA 1ST 15 MINUTES: Performed by: INTERNAL MEDICINE

## 2022-03-04 PROCEDURE — D9220A PRA ANESTHESIA: ICD-10-PCS | Mod: CRNA,,, | Performed by: NURSE ANESTHETIST, CERTIFIED REGISTERED

## 2022-03-04 PROCEDURE — G0105 COLORECTAL SCRN; HI RISK IND: HCPCS | Performed by: INTERNAL MEDICINE

## 2022-03-04 RX ORDER — PROPOFOL 10 MG/ML
VIAL (ML) INTRAVENOUS CONTINUOUS PRN
Status: DISCONTINUED | OUTPATIENT
Start: 2022-03-04 | End: 2022-03-04

## 2022-03-04 RX ORDER — PROPOFOL 10 MG/ML
VIAL (ML) INTRAVENOUS
Status: DISCONTINUED | OUTPATIENT
Start: 2022-03-04 | End: 2022-03-04

## 2022-03-04 RX ORDER — HALOPERIDOL 5 MG/ML
0.5 INJECTION INTRAMUSCULAR EVERY 10 MIN PRN
Status: CANCELLED | OUTPATIENT
Start: 2022-03-04

## 2022-03-04 RX ORDER — SODIUM CHLORIDE 9 MG/ML
INJECTION, SOLUTION INTRAVENOUS CONTINUOUS
Status: DISCONTINUED | OUTPATIENT
Start: 2022-03-04 | End: 2022-03-04 | Stop reason: HOSPADM

## 2022-03-04 RX ORDER — LIDOCAINE HYDROCHLORIDE 20 MG/ML
INJECTION INTRAVENOUS
Status: DISCONTINUED | OUTPATIENT
Start: 2022-03-04 | End: 2022-03-04

## 2022-03-04 RX ADMIN — LIDOCAINE HYDROCHLORIDE 50 MG: 20 INJECTION, SOLUTION INTRAVENOUS at 07:03

## 2022-03-04 RX ADMIN — PROPOFOL 100 MG: 10 INJECTION, EMULSION INTRAVENOUS at 07:03

## 2022-03-04 RX ADMIN — SODIUM CHLORIDE: 0.9 INJECTION, SOLUTION INTRAVENOUS at 07:03

## 2022-03-04 RX ADMIN — Medication 150 MCG/KG/MIN: at 07:03

## 2022-03-04 NOTE — ANESTHESIA POSTPROCEDURE EVALUATION
Anesthesia Post Evaluation    Patient: Rhoda Daly    Procedure(s) Performed: Procedure(s) (LRB):  COLONOSCOPY (N/A)    Final Anesthesia Type: general      Patient location during evaluation: PACU  Patient participation: Yes- Able to Participate  Level of consciousness: awake and alert  Post-procedure vital signs: reviewed and stable  Pain management: adequate  Airway patency: patent    PONV status at discharge: No PONV  Anesthetic complications: no      Cardiovascular status: blood pressure returned to baseline  Respiratory status: unassisted  Hydration status: euvolemic  Follow-up not needed.          Vitals Value Taken Time   /72 03/04/22 0847   Temp 36.6 °C (97.9 °F) 03/04/22 0821   Pulse 57 03/04/22 0847   Resp 30 03/04/22 0847   SpO2 100 % 03/04/22 0847   Vitals shown include unvalidated device data.      No case tracking events are documented in the log.      Pain/Grant Score: Grant Score: 10 (3/4/2022  8:21 AM)

## 2022-03-04 NOTE — H&P
Short Stay Endoscopy History and Physical    PCP - Manoj Stein MD    Procedure - Colonoscopy  ASA - 2  Mallampati - per anesthesia  History of Anesthesia problems - no  Family history Anesthesia problems -  no     HPI:  This is a 56 y.o. female here for evaluation of :     Average Risk Screening: No  High risk screening:  Yes  History of polyps: yes  Anemia: No  Blood in stools: No  Diarrhea: No  Abdominal Pain: No    Review of Systems:  CONSTITUTIONAL: Denies weight change,  fatigue, fevers, chills, night sweats.  CARDIOVASCULAR: Denies chest pain, shortness of breath, orthopnea and edema.  RESPIRATORY: Denies cough, hemoptysis, dyspnea, and wheezing.  GI: See HPI.    Medical History:  Past Medical History:   Diagnosis Date    Colon polyp 2015    Diverticulitis     Hypertension        Surgical History:   Past Surgical History:   Procedure Laterality Date     SECTION         Family History:   Family History   Problem Relation Age of Onset    Hypertension Mother     Peripheral vascular disease Mother         renal artery stenosis    Skin cancer Mother     Hyperlipidemia Father     Heart disease Father 80        cad, in 70s    Melanoma Father     Skin cancer Sister     Breast cancer Neg Hx     Colon cancer Neg Hx     Ovarian cancer Neg Hx     Cancer Neg Hx        Social History:   Social History     Tobacco Use    Smoking status: Never Smoker    Smokeless tobacco: Never Used   Substance Use Topics    Alcohol use: Yes     Comment: daily, beer tonight    Drug use: No       Allergies: Reviewed.    Medications:  No current facility-administered medications on file prior to encounter.     Current Outpatient Medications on File Prior to Encounter   Medication Sig Dispense Refill    triamcinolone acetonide 0.1% (KENALOG) 0.1 % cream AAA on hand BID x 1-2 wks then prn flares only (Patient not taking: Reported on 10/4/2021) 45 g 1       Physical Exam:  Vital Signs: There were no vitals  filed for this visit.  General Appearance: Well appearing in no acute distress  ENT: OP clear  Chest: CTA B  CV: RRR, no m/r/g  Abd: s/nt/nd/nabs  Ext: no edema    Labs:  Reviewed    Imp: Hx of polyps    Plan:  I have explained the risks and benefits of colonoscopy to the patient including but not limited to bleeding, perforation, infection, and death. The patient wishes to proceed with colonoscopy.

## 2022-03-04 NOTE — [employer] PATIENT INSTRUCTIONS
Discharge Summary/Instructions after an Endoscopic Procedure  Patient Name: Rhoda Daly  Patient MRN: [id number]  Patient YOB: 1965 Friday, March 4, 2022  Elvin Parker MD  Dear patient,  As a result of recent federal legislation (The Federal Cures Act), you may   receive lab or pathology results from your procedure in your [facility]   account before your physician is able to contact you. Your physician or   their representative will relay the results to you with their   recommendations at their soonest availability.  Thank you,  RESTRICTIONS:  During your procedure today, you received medications for sedation.  These   medications may affect your judgment, balance and coordination.  Therefore,   for 24 hours, you have the following restrictions:   - DO NOT drive a car, operate machinery, make legal/financial decisions,   sign important papers or drink alcohol.    ACTIVITY:  Today: no heavy lifting, straining or running due to procedural   sedation/anesthesia.  The following day: return to full activity including work.  DIET:  Eat and drink normally unless instructed otherwise.     TREATMENT FOR COMMON SIDE EFFECTS:  - Mild abdominal pain, nausea, belching, bloating or excessive gas:  rest,   eat lightly and use a heating pad.  - Sore Throat: treat with throat lozenges and/or gargle with warm salt   water.  - Because air was used during the procedure, expelling large amounts of air   from your rectum or belching is normal.  - If a bowel prep was taken, you may not have a bowel movement for 1-3 days.    This is normal.  SYMPTOMS TO WATCH FOR AND REPORT TO YOUR PHYSICIAN:  1. Abdominal pain or bloating, other than gas cramps.  2. Chest pain.  3. Back pain.  4. Signs of infection such as: chills or fever occurring within 24 hours   after the procedure.  5. Rectal bleeding, which would show as bright red, maroon, or black stools.   (A tablespoon of blood from the rectum is not serious, especially  if   hemorrhoids are present.)  6. Vomiting.  7. Weakness or dizziness.  GO DIRECTLY TO THE NEAREST EMERGENCY ROOM IF YOU HAVE ANY OF THE FOLLOWING:      Difficulty breathing              Chills and/or fever over 101 F   Persistent vomiting and/or vomiting blood   Severe abdominal pain   Severe chest pain   Black, tarry stools   Bleeding- more than one tablespoon   Any other symptom or condition that you feel may need urgent attention  Your doctor recommends these additional instructions:  If any biopsies were taken, your doctors clinic will contact you in 1 to 2   weeks with any results.  - Discharge patient to home.   - High fiber diet indefinitely.   - Continue present medications.   - Use fiber, for example Citrucel, Fibercon, Konsyl or Metamucil.   - Repeat colonoscopy in 10 years for screening purposes.   - Return to referring physician as previously scheduled.   - Patient has a contact number available for emergencies.  The signs and   symptoms of potential delayed complications were discussed with the   patient.  Return to normal activities tomorrow.  Written discharge   instructions were provided to the patient.  For questions, problems or results please call your physician - Elvin Parker MD at Work:  [phone].  [facility], EMERGENCY ROOM PHONE NUMBER: [phone]  IF A COMPLICATION OR EMERGENCY SITUATION ARISES AND YOU ARE UNABLE TO REACH   YOUR PHYSICIAN - GO DIRECTLY TO THE EMERGENCY ROOM.  Elvin Parker MD  3/4/2022 8:19:35 AM  This report has been verified and signed electronically.  Dear patient,  As a result of recent federal legislation (The Federal Cures Act), you may   receive lab or pathology results from your procedure in your [facility]   account before your physician is able to contact you. Your physician or   their representative will relay the results to you with their   recommendations at their soonest availability.  Thank you,  PROVATION

## 2022-03-04 NOTE — ANESTHESIA PREPROCEDURE EVALUATION
[facility]  Anesthesia Pre-Operative Evaluation       Patient Name: Rhoda Daly  YOB: 1965  MRN: [id number]  [facility]: [id number]      Code Status: No Order   Date of Procedure: 3/4/2022  Anesthesia: General Procedure: Procedure(s) (LRB):  COLONOSCOPY (N/A)  Pre-Operative Diagnosis: Screening [Z13.9]  Personal history of colonic polyps [Z86.010]  Proceduralist: Surgeon(s) and Role:     * Elvin Parker MD - Primary Nurse: (Unknown)      SUBJECTIVE:   Rhoda Daly is a 56 y.o. female who  has a past medical history of Colon polyp (2015), Diverticulitis, and Hypertension. No notes on file    Revised cardiac risk index (RCRI) score is 0.    she has a current medication list which includes the following long-term medication(s): triamcinolone acetonide 0.1% and valsartan.   ALLERGIES:   Review of patient's allergies indicates:  No Known Allergies  LDA:      Lines/Drains/Airways     Peripheral Intravenous Line  Duration                Peripheral IV - Single Lumen 10/01/20 1518 22 G Right Antecubital 518 days              MEDICATIONS:     Antibiotics (From admission, onward)            None        VTE Risk Mitigation (From admission, onward)    None        No current facility-administered medications for this encounter.     Current Outpatient Medications   Medication Sig Dispense Refill    triamcinolone acetonide 0.1% (KENALOG) 0.1 % cream AAA on hand BID x 1-2 wks then prn flares only (Patient not taking: Reported on 10/4/2021) 45 g 1    valsartan (DIOVAN) 40 MG tablet Take 1 tablet (40 mg total) by mouth once daily. 90 tablet 1          History:   There are no hospital problems to display for this patient.    Surgical History:    has a past surgical history that includes  section.   Social History:    reports being sexually active and has had partner(s) who are male. She reports using the following method of birth control/protection: None.  reports that she has  never smoked. She has never used smokeless tobacco. She reports current alcohol use. She reports that she does not use drugs.     OBJECTIVE:     Vital Signs (Most Recent):    Vital Signs Range (Last 24H):          There is no height or weight on file to calculate BMI.   Wt Readings from Last 4 Encounters:   10/04/21 58 kg (127 lb 13.9 oz)   03/04/21 57.9 kg (127 lb 10.3 oz)   10/01/20 58.2 kg (128 lb 4.9 oz)   08/11/20 59.1 kg (130 lb 4.7 oz)     Significant Labs:  Lab Results   Component Value Date    WBC 5.09 10/04/2021    HGB 13.4 10/04/2021    HCT 39.6 10/04/2021     10/04/2021     10/04/2021    K 4.2 10/04/2021     10/04/2021    CREATININE 0.8 10/04/2021    BUN 13 10/04/2021    CO2 28 10/04/2021    GLU 94 10/04/2021    CALCIUM 9.5 10/04/2021    ALKPHOS 90 10/04/2021    ALT 17 10/04/2021    AST 22 10/04/2021    ALBUMIN 3.8 10/04/2021    INR 1.0 08/22/2012    APTT 35.4 (H) 08/22/2012    HGBA1C 5.2 10/04/2021    TROPONINI <0.006 02/02/2018    BNP 31 02/02/2018     Patient's last menstrual period was 02/21/2018 (exact date).  No results found for this or any previous visit (from the past 72 hour(s)).    EKG:   Results for orders placed or performed during the hospital encounter of 02/02/18   EKG 12-lead    Collection Time: 02/02/18 10:49 AM    Narrative    Test Reason : I10  Blood Pressure : ** mmHG  Vent. Rate : 070 BPM     Atrial Rate : 070 BPM     P-R Int : 150 ms          QRS Dur : 080 ms      QT Int : 374 ms       P-R-T Axes : 058 -04 045 degrees     QTc Int : 403 ms    Normal sinus rhythm  Normal ECG  No previous ECGs available  Confirmed by RALF HERNANDEZ, HOMEYAR ([id number]) on 2/2/2018 10:30:20 PM    Referred By: [email or web address]   SELF           Confirmed By:TIA ARAMBULA MD     TTE:  No results found for this or any previous visit.  No results found for: EF   No results found for this or any previous visit.  PUMA:  No results found for this or any previous visit.  Stress Test:  No results found for  this or any previous visit.     LHC:  No results found for this or any previous visit.     PFT:  No results found for: FEV1, FVC, NBD9BFG, TLC, DLCO   ASSESSMENT/PLAN:     Pre-op Assessment    I have reviewed the Patient Summary Reports.     I have reviewed the Nursing Notes.    I have reviewed the Medications.     Review of Systems  Anesthesia Hx:  No problems with previous Anesthesia    Hematology/Oncology:  Hematology Normal   Oncology Normal     EENT/Dental:EENT/Dental Normal   Cardiovascular:   Exercise tolerance: good Hypertension    Pulmonary:  Pulmonary Normal    Renal/:  Renal/ Normal     Hepatic/GI:  Hepatic/GI Normal    Musculoskeletal:  Musculoskeletal Normal    Neurological:  Neurology Normal    Endocrine:  Endocrine Normal    Dermatological:  Skin Normal    Psych:  Psychiatric Normal           Physical Exam  General: Well nourished    Airway:  Mallampati: III / II  Mouth Opening: Normal  TM Distance: Normal  Tongue: Normal  Neck ROM: Normal ROM    Dental:  Intact        Anesthesia Plan  Type of Anesthesia, risks & benefits discussed:    Anesthesia Type: Gen ETT, Gen Natural Airway, MAC  Intra-op Monitoring Plan: Standard ASA Monitors  Post Op Pain Control Plan: multimodal analgesia and IV/PO Opioids PRN  Induction:  IV  Airway Plan: Direct and Video, Post-Induction  Informed Consent: Informed consent signed with the Patient and all parties understand the risks and agree with anesthesia plan.  All questions answered.   ASA Score: 2  Day of Surgery Review of History & Physical: H&P completed by Anesthesiologist.  Anesthesia Plan Notes: Chart reviewed, patient interviewed and examined.  The anesthetic plan was explained.  Risks, benefits, and alternatives were discussed. Questions were answered and the consent was signed.        JOSUE Salazar M.D.         Ready For Surgery From Anesthesia Perspective.     .

## 2022-03-04 NOTE — TRANSFER OF CARE
"Anesthesia Transfer of Care Note    Patient: Rhoda Daly    Procedure(s) Performed: Procedure(s) (LRB):  COLONOSCOPY (N/A)    Patient location: PACU    Anesthesia Type: general    Transport from OR: Transported from OR on 2-3 L/min O2 by NC with adequate spontaneous ventilation    Post pain: adequate analgesia    Post assessment: no apparent anesthetic complications and tolerated procedure well    Post vital signs: stable    Level of consciousness: awake    Nausea/Vomiting: no nausea/vomiting    Complications: none    Transfer of care protocol was followed      Last vitals:   Visit Vitals  BP (!) 144/84 (BP Location: Left arm, Patient Position: Lying)   Pulse 71   Temp 36.7 °C (98.1 °F) (Oral)   Resp 16   Ht 5' 4" (1.626 m)   Wt 51.3 kg (113 lb)   LMP 02/21/2018 (Exact Date)   SpO2 99%   Breastfeeding No   BMI 19.40 kg/m²     "

## 2022-03-11 ENCOUNTER — OFFICE VISIT (OUTPATIENT)
Dept: PSYCHIATRY | Facility: CLINIC | Age: 57
End: 2022-03-11
Payer: COMMERCIAL

## 2022-03-11 DIAGNOSIS — F43.20 ADJUSTMENT DISORDER, UNSPECIFIED TYPE: Primary | ICD-10-CM

## 2022-03-11 PROCEDURE — 4010F PR ACE/ARB THEARPY RXD/TAKEN: ICD-10-PCS | Mod: CPTII,S$GLB,, | Performed by: SOCIAL WORKER

## 2022-03-11 PROCEDURE — 90834 PSYTX W PT 45 MINUTES: CPT | Mod: S$GLB,,, | Performed by: SOCIAL WORKER

## 2022-03-11 PROCEDURE — 4010F ACE/ARB THERAPY RXD/TAKEN: CPT | Mod: CPTII,S$GLB,, | Performed by: SOCIAL WORKER

## 2022-03-11 PROCEDURE — 90834 PR PSYCHOTHERAPY W/PATIENT, 45 MIN: ICD-10-PCS | Mod: S$GLB,,, | Performed by: SOCIAL WORKER

## 2022-03-13 NOTE — PROGRESS NOTES
Individual Psychotherapy (PhD/LCSW)    3/11/2022    Site:  [facility]         Therapeutic Intervention: Met with patient.  Outpatient - Behavior modifying psychotherapy 45 min - CPT code 10108, Outpatient - Supportive psychotherapy 45 min - CPT Code 81149 and Outpatient - Interactive psychotherapy 45 min - CPT code 39625    Chief complaint/reason for encounter: interpersonal     Interval history and content of current session: Pt is a 56 year-old  white female who presents this morning for a follow up therapy session. Pt reports that colonoscopy went well with normal findings. Pt states that she and  continue to meet up despite pt's disappointments regarding 's continued behaviors. Pt reports that she is looking forward to their family therapy session this weekend and is hopeful it will provide some insight into the relationship. Pt continues to express frustration regarding finances and 's lack of transparency. Pt continues to navigate living alone and discussed the challenges and benefits.      Treatment plan:  · Target symptoms: anxiety , interpersonal issues  · Why chosen therapy is appropriate versus another modality: relevant to diagnosis, patient responds to this modality, evidence based practice  · Outcome monitoring methods: self-report, observation  · Therapeutic intervention type: insight oriented psychotherapy, behavior modifying psychotherapy, supportive psychotherapy    Risk parameters:  Patient reports no suicidal ideation  Patient reports no homicidal ideation  Patient reports no self-injurious behavior  Patient reports no violent behavior    Verbal deficits: None    Patient's response to intervention:  The patient's response to intervention is accepting.    Progress toward goals and other mental status changes:  The patient's progress toward goals is excellent.    Diagnosis:     ICD-10-CM ICD-9-CM   1. Adjustment disorder, unspecified type  F43.20 309.9        Plan:  individual psychotherapy    Return to clinic: 1 week    Length of Service (minutes): 45

## 2022-03-15 ENCOUNTER — OFFICE VISIT (OUTPATIENT)
Dept: PSYCHIATRY | Facility: CLINIC | Age: 57
End: 2022-03-15
Payer: COMMERCIAL

## 2022-03-15 DIAGNOSIS — F43.20 ADJUSTMENT DISORDER, UNSPECIFIED TYPE: Primary | ICD-10-CM

## 2022-03-15 PROCEDURE — 90834 PR PSYCHOTHERAPY W/PATIENT, 45 MIN: ICD-10-PCS | Mod: S$GLB,,, | Performed by: SOCIAL WORKER

## 2022-03-15 PROCEDURE — 4010F PR ACE/ARB THEARPY RXD/TAKEN: ICD-10-PCS | Mod: CPTII,S$GLB,, | Performed by: SOCIAL WORKER

## 2022-03-15 PROCEDURE — 90834 PSYTX W PT 45 MINUTES: CPT | Mod: S$GLB,,, | Performed by: SOCIAL WORKER

## 2022-03-15 PROCEDURE — 4010F ACE/ARB THERAPY RXD/TAKEN: CPT | Mod: CPTII,S$GLB,, | Performed by: SOCIAL WORKER

## 2022-03-15 NOTE — PROGRESS NOTES
Individual Psychotherapy (PhD/LCSW)    3/15/2022    Site:  [facility]         Therapeutic Intervention: Met with patient.  Outpatient - Behavior modifying psychotherapy 45 min - CPT code 98468, Outpatient - Supportive psychotherapy 45 min - CPT Code 05885 and Outpatient - Interactive psychotherapy 45 min - CPT code 38406    Chief complaint/reason for encounter: interpersonal     Interval history and content of current session: Pt is a 56 year-old  white female who presents this morning for a follow up therapy session. Pt reports that she and  went to couples therapy session this weekend. Pt reports that  seems invested although he continues to remain guarded with pt surrounding finances. Pt reports that she is working on learning how to be alone without  in the house. Pt discussed that she recently had to force herself out of bed during the middle of the day to take a walk. Pt states that it was ultimately helpful to do the opposite of what she was feeling. Pt expressed sadness around mother's depression which may be related to aging, being a  and having some health issues. Pt overwhelmed with the task of having to manage mother's health/wellness. Clinician and pt discussed possibility of pt inviting mother over weekly to have dinner and partake in an activity (puzzle or board game). This way, pt has the ability to spend time with mother in a meaningful way and it will get mother out of the house. Pt reports that she may go on a road trip with a friend in a few weeks. Clinician recommending this as self care, change of scenery for the pt. Pt to continue with  in couples counseling on a weekly basis.     Treatment plan:  · Target symptoms: anxiety , interpersonal issues  · Why chosen therapy is appropriate versus another modality: relevant to diagnosis, patient responds to this modality, evidence based practice  · Outcome monitoring methods: self-report,  observation  · Therapeutic intervention type: insight oriented psychotherapy, behavior modifying psychotherapy, supportive psychotherapy    Risk parameters:  Patient reports no suicidal ideation  Patient reports no homicidal ideation  Patient reports no self-injurious behavior  Patient reports no violent behavior    Verbal deficits: None    Patient's response to intervention:  The patient's response to intervention is accepting.    Progress toward goals and other mental status changes:  The patient's progress toward goals is excellent.    Diagnosis:     ICD-10-CM ICD-9-CM   1. Adjustment disorder, unspecified type  F43.20 309.9       Plan:  individual psychotherapy    Return to clinic: 1 week    Length of Service (minutes): 45

## 2022-03-22 ENCOUNTER — OFFICE VISIT (OUTPATIENT)
Dept: OBSTETRICS AND GYNECOLOGY | Facility: CLINIC | Age: 57
End: 2022-03-22
Payer: COMMERCIAL

## 2022-03-22 VITALS
DIASTOLIC BLOOD PRESSURE: 80 MMHG | HEIGHT: 64 IN | BODY MASS INDEX: 19.42 KG/M2 | WEIGHT: 113.75 LBS | SYSTOLIC BLOOD PRESSURE: 120 MMHG

## 2022-03-22 DIAGNOSIS — Z12.31 BREAST CANCER SCREENING BY MAMMOGRAM: ICD-10-CM

## 2022-03-22 DIAGNOSIS — Z01.419 WELL WOMAN EXAM WITH ROUTINE GYNECOLOGICAL EXAM: ICD-10-CM

## 2022-03-22 DIAGNOSIS — N95.2 VAGINAL ATROPHY: Primary | ICD-10-CM

## 2022-03-22 PROCEDURE — 4010F PR ACE/ARB THEARPY RXD/TAKEN: ICD-10-PCS | Mod: CPTII,S$GLB,, | Performed by: STUDENT IN AN ORGANIZED HEALTH CARE EDUCATION/TRAINING PROGRAM

## 2022-03-22 PROCEDURE — 99999 PR PBB SHADOW E&M-EST. PATIENT-LVL III: ICD-10-PCS | Mod: PBBFAC,,, | Performed by: STUDENT IN AN ORGANIZED HEALTH CARE EDUCATION/TRAINING PROGRAM

## 2022-03-22 PROCEDURE — 3079F DIAST BP 80-89 MM HG: CPT | Mod: CPTII,S$GLB,, | Performed by: STUDENT IN AN ORGANIZED HEALTH CARE EDUCATION/TRAINING PROGRAM

## 2022-03-22 PROCEDURE — 3079F PR MOST RECENT DIASTOLIC BLOOD PRESSURE 80-89 MM HG: ICD-10-PCS | Mod: CPTII,S$GLB,, | Performed by: STUDENT IN AN ORGANIZED HEALTH CARE EDUCATION/TRAINING PROGRAM

## 2022-03-22 PROCEDURE — 3074F PR MOST RECENT SYSTOLIC BLOOD PRESSURE < 130 MM HG: ICD-10-PCS | Mod: CPTII,S$GLB,, | Performed by: STUDENT IN AN ORGANIZED HEALTH CARE EDUCATION/TRAINING PROGRAM

## 2022-03-22 PROCEDURE — 1159F MED LIST DOCD IN RCRD: CPT | Mod: CPTII,S$GLB,, | Performed by: STUDENT IN AN ORGANIZED HEALTH CARE EDUCATION/TRAINING PROGRAM

## 2022-03-22 PROCEDURE — 3008F BODY MASS INDEX DOCD: CPT | Mod: CPTII,S$GLB,, | Performed by: STUDENT IN AN ORGANIZED HEALTH CARE EDUCATION/TRAINING PROGRAM

## 2022-03-22 PROCEDURE — 3074F SYST BP LT 130 MM HG: CPT | Mod: CPTII,S$GLB,, | Performed by: STUDENT IN AN ORGANIZED HEALTH CARE EDUCATION/TRAINING PROGRAM

## 2022-03-22 PROCEDURE — 99396 PR PREVENTIVE VISIT,EST,40-64: ICD-10-PCS | Mod: S$GLB,,, | Performed by: STUDENT IN AN ORGANIZED HEALTH CARE EDUCATION/TRAINING PROGRAM

## 2022-03-22 PROCEDURE — 1159F PR MEDICATION LIST DOCUMENTED IN MEDICAL RECORD: ICD-10-PCS | Mod: CPTII,S$GLB,, | Performed by: STUDENT IN AN ORGANIZED HEALTH CARE EDUCATION/TRAINING PROGRAM

## 2022-03-22 PROCEDURE — 99396 PREV VISIT EST AGE 40-64: CPT | Mod: S$GLB,,, | Performed by: STUDENT IN AN ORGANIZED HEALTH CARE EDUCATION/TRAINING PROGRAM

## 2022-03-22 PROCEDURE — 99999 PR PBB SHADOW E&M-EST. PATIENT-LVL III: CPT | Mod: PBBFAC,,, | Performed by: STUDENT IN AN ORGANIZED HEALTH CARE EDUCATION/TRAINING PROGRAM

## 2022-03-22 PROCEDURE — 3008F PR BODY MASS INDEX (BMI) DOCUMENTED: ICD-10-PCS | Mod: CPTII,S$GLB,, | Performed by: STUDENT IN AN ORGANIZED HEALTH CARE EDUCATION/TRAINING PROGRAM

## 2022-03-22 PROCEDURE — 4010F ACE/ARB THERAPY RXD/TAKEN: CPT | Mod: CPTII,S$GLB,, | Performed by: STUDENT IN AN ORGANIZED HEALTH CARE EDUCATION/TRAINING PROGRAM

## 2022-03-22 RX ORDER — ESTRADIOL 0.1 MG/G
1 CREAM VAGINAL DAILY
Qty: 42.5 G | Refills: 11 | Status: SHIPPED | OUTPATIENT
Start: 2022-03-22 | End: 2022-03-24

## 2022-03-22 NOTE — PROGRESS NOTES
History & Physical  Gynecology      SUBJECTIVE:     Chief Complaint: Well Woman (Painful intercourse with vaginal dryness)       History of Present Illness:    Menstrual History: menopausal since age early 50s. No HRT. No PMB. Having issues with vaginal dryness. Has had chronic insomnia. No hot flashes  Obstetric Hx: 2; 1SVD 1 CS  Sexually Active: one male partner  Family history: Denies any personal or family history of GYN/colon cancers   Social: Wears seatbelts. Exercises. Feels safe at home.   Last pap:  normal, HPV neg. Due .  Last mammo: utd  Colonoscopy: utd  covid vaccine yes  Labs utd  Vitamins: no        Review of patient's allergies indicates:  No Known Allergies    Past Medical History:   Diagnosis Date    Colon polyp 2015    Diverticulitis     Hypertension      Past Surgical History:   Procedure Laterality Date     SECTION      COLONOSCOPY N/A 3/4/2022    Procedure: COLONOSCOPY;  Surgeon: Elvin Parker MD;  Location: [facility] ([address] [place] [address]);  Service: Endoscopy;  Laterality: N/A;  Pt. is Fully Vaccinated.EC   rescheduled; covid test  @ [facility]; instructions emailed-st  2nd floor due to availability   pt rescheduled multiple times; covid + 22 < 60 days; instructions emailed-st  3/2-pt confirmed appt for 3/4/22-BB     OB History        2    Para   2    Term   0            AB        Living   2       SAB        IAB        Ectopic        Multiple        Live Births   2               Family History   Problem Relation Age of Onset    Hypertension Mother     Peripheral vascular disease Mother         renal artery stenosis    Skin cancer Mother     Hyperlipidemia Father     Heart disease Father 80        cad, in 70s    Melanoma Father     Skin cancer Sister     Breast cancer Neg Hx     Colon cancer Neg Hx     Ovarian cancer Neg Hx     Cancer Neg Hx      Social History     Tobacco Use    Smoking status: Never Smoker    Smokeless tobacco: Never  Used   Substance Use Topics    Alcohol use: Yes     Comment: daily, beer tonight    Drug use: No       Current Outpatient Medications   Medication Sig    valsartan (DIOVAN) 40 MG tablet Take 1 tablet (40 mg total) by mouth once daily.    estradioL (ESTRACE) 0.01 % (0.1 mg/gram) vaginal cream Place 1 g vaginally once daily. Apply pea-sized amount to vagina once a day for two weeks, then twice a week there after    triamcinolone acetonide 0.1% (KENALOG) 0.1 % cream AAA on hand BID x 1-2 wks then prn flares only     No current facility-administered medications for this visit.         Review of Systems:  Review of Systems   Constitutional: Negative for activity change, appetite change, fever and unexpected weight change.   Respiratory: Negative for cough and shortness of breath.    Cardiovascular: Negative for chest pain.   Gastrointestinal: Negative for abdominal pain, blood in stool, constipation, diarrhea, nausea and vomiting.   Genitourinary: Positive for dyspareunia and vaginal dryness. Negative for dysuria, hematuria, pelvic pain, vaginal bleeding, vaginal discharge, vaginal pain, urinary incontinence, postcoital bleeding, postmenopausal bleeding and vaginal odor.   Integumentary:  Negative for breast mass.   Neurological: Negative for headaches.   Psychiatric/Behavioral: Negative for sleep disturbance.   Breast: Negative for lump and mass       OBJECTIVE:     Physical Exam:  Physical Exam  Vitals reviewed.   Constitutional:       General: She is not in acute distress.     Appearance: She is well-developed. She is not diaphoretic.   HENT:      Head: Normocephalic and atraumatic.   Eyes:      General: No scleral icterus.        Right eye: No discharge.         Left eye: No discharge.      Conjunctiva/sclera: Conjunctivae normal.   Neck:      Thyroid: No thyromegaly.   Cardiovascular:      Rate and Rhythm: Normal rate.   Pulmonary:      Effort: Pulmonary effort is normal.   Chest:   Breasts: Breasts are  symmetrical.      Right: No inverted nipple, mass, nipple discharge, skin change or tenderness.      Left: No inverted nipple, mass, nipple discharge, skin change or tenderness.       Abdominal:      General: There is no distension.      Palpations: Abdomen is soft.      Tenderness: There is no abdominal tenderness.   Genitourinary:     Labia:         Right: No rash, tenderness, lesion or injury.         Left: No rash, tenderness, lesion or injury.       Vagina: Normal. No signs of injury and foreign body. No vaginal discharge, erythema, tenderness or bleeding.      Cervix: No cervical motion tenderness, discharge or friability.      Uterus: Not deviated, not enlarged, not fixed and not tender.       Adnexa:         Right: No mass, tenderness or fullness.          Left: No mass, tenderness or fullness.     Musculoskeletal:         General: Normal range of motion.      Cervical back: Normal range of motion and neck supple.   Lymphadenopathy:      Cervical: No cervical adenopathy.   Skin:     General: Skin is warm and dry.      Findings: No erythema or rash.   Neurological:      Mental Status: She is alert and oriented to person, place, and time.           ASSESSMENT:       ICD-10-CM ICD-9-CM    1. Vaginal atrophy  N95.2 627.3    2. Well woman exam with routine gynecological exam  Z01.419 V72.31           Plan:      WWE  - Postmenopausal.   - Vaccines utd  - Labs utd  - Mammogram, Pap, Colonoscopy utd  - Vitamin D and Calcium recs given  - CBE normal. Physical exam normal. VSS      Vaginal atrophy  - reviewed comfort remedies and topical estrogen. Reviewed proper use  Counseling time: 30 minutes    Cherelle Lopez

## 2022-03-24 ENCOUNTER — PATIENT MESSAGE (OUTPATIENT)
Dept: OBSTETRICS AND GYNECOLOGY | Facility: CLINIC | Age: 57
End: 2022-03-24
Payer: COMMERCIAL

## 2022-03-28 ENCOUNTER — PATIENT MESSAGE (OUTPATIENT)
Dept: PSYCHIATRY | Facility: CLINIC | Age: 57
End: 2022-03-28
Payer: COMMERCIAL

## 2022-04-05 ENCOUNTER — PATIENT MESSAGE (OUTPATIENT)
Dept: PSYCHIATRY | Facility: CLINIC | Age: 57
End: 2022-04-05
Payer: COMMERCIAL

## 2022-04-05 ENCOUNTER — OFFICE VISIT (OUTPATIENT)
Dept: PSYCHIATRY | Facility: CLINIC | Age: 57
End: 2022-04-05
Payer: COMMERCIAL

## 2022-04-05 DIAGNOSIS — F43.20 ADJUSTMENT DISORDER, UNSPECIFIED TYPE: Primary | ICD-10-CM

## 2022-04-05 PROCEDURE — 90834 PR PSYCHOTHERAPY W/PATIENT, 45 MIN: ICD-10-PCS | Mod: S$GLB,,, | Performed by: SOCIAL WORKER

## 2022-04-05 PROCEDURE — 90834 PSYTX W PT 45 MINUTES: CPT | Mod: S$GLB,,, | Performed by: SOCIAL WORKER

## 2022-04-05 PROCEDURE — 4010F ACE/ARB THERAPY RXD/TAKEN: CPT | Mod: CPTII,S$GLB,, | Performed by: SOCIAL WORKER

## 2022-04-05 PROCEDURE — 4010F PR ACE/ARB THEARPY RXD/TAKEN: ICD-10-PCS | Mod: CPTII,S$GLB,, | Performed by: SOCIAL WORKER

## 2022-04-05 NOTE — PROGRESS NOTES
Individual Psychotherapy (PhD/LCSW)    4/5/2022    Site:  [facility]         Therapeutic Intervention: Met with patient.  Outpatient - Behavior modifying psychotherapy 45 min - CPT code 21685, Outpatient - Supportive psychotherapy 45 min - CPT Code 79030 and Outpatient - Interactive psychotherapy 45 min - CPT code 70089    Chief complaint/reason for encounter: interpersonal     Interval history and content of current session: Pt is a 56 year-old  white female who presents this morning for a follow up therapy session. Pt reports that she and  continue to argue about finances. Pt describes difficulty setting ultimatums and boundaries with  in hopes that he will make changes without these. Pt reports that she and  continue to see a couple's therapist to address the issues, however pt states that she left the previous session very angry.  still has not been transparent about the finances and is insisting their daughter be [employer] for next year despite the financial burden. Pt expresses that she feels she has no say as she is not bringing in any income. Clinician discussed with pt the idea of taking a communication break with . Pt interested in exploring this and will bring it up in next couple's session. Pt is filling her weekends spending time with other friends and her mother. Pt states that she enjoyed dinner at a friend's house where it was two couples and the pt. She reports that she felt supported. Pt still bothered by relationship  has with another woman, despite 's report that he and the other woman are just friends. Pt is implementing more structure in her week, including a ceramics class and possible consultation opportunities.     Treatment plan:  · Target symptoms: anxiety , interpersonal issues  · Why chosen therapy is appropriate versus another modality: relevant to diagnosis, patient responds to this modality, evidence  based practice  · Outcome monitoring methods: self-report, observation  · Therapeutic intervention type: insight oriented psychotherapy, behavior modifying psychotherapy, supportive psychotherapy    Risk parameters:  Patient reports no suicidal ideation  Patient reports no homicidal ideation  Patient reports no self-injurious behavior  Patient reports no violent behavior    Verbal deficits: None    Patient's response to intervention:  The patient's response to intervention is accepting.    Progress toward goals and other mental status changes:  The patient's progress toward goals is excellent.    Diagnosis:     ICD-10-CM ICD-9-CM   1. Adjustment disorder, unspecified type  F43.20 309.9       Plan:  individual psychotherapy    Return to clinic: 1 week    Length of Service (minutes): 45

## 2022-04-06 ENCOUNTER — PATIENT MESSAGE (OUTPATIENT)
Dept: PSYCHIATRY | Facility: CLINIC | Age: 57
End: 2022-04-06
Payer: COMMERCIAL

## 2022-04-11 ENCOUNTER — PATIENT MESSAGE (OUTPATIENT)
Dept: PSYCHIATRY | Facility: CLINIC | Age: 57
End: 2022-04-11
Payer: COMMERCIAL

## 2022-04-13 ENCOUNTER — OFFICE VISIT (OUTPATIENT)
Dept: PSYCHIATRY | Facility: CLINIC | Age: 57
End: 2022-04-13
Payer: COMMERCIAL

## 2022-04-13 ENCOUNTER — IMMUNIZATION (OUTPATIENT)
Dept: INTERNAL MEDICINE | Facility: CLINIC | Age: 57
End: 2022-04-13
Payer: COMMERCIAL

## 2022-04-13 DIAGNOSIS — Z23 NEED FOR VACCINATION: Primary | ICD-10-CM

## 2022-04-13 DIAGNOSIS — F43.20 ADJUSTMENT DISORDER, UNSPECIFIED TYPE: Primary | ICD-10-CM

## 2022-04-13 PROCEDURE — 0054A COVID-19, MRNA, LNP-S, PF, 30 MCG/0.3 ML DOSE VACCINE (PFIZER): CPT | Mod: CV19,PBBFAC | Performed by: INTERNAL MEDICINE

## 2022-04-13 PROCEDURE — 90834 PSYTX W PT 45 MINUTES: CPT | Mod: 95,,, | Performed by: SOCIAL WORKER

## 2022-04-13 PROCEDURE — 90834 PR PSYCHOTHERAPY W/PATIENT, 45 MIN: ICD-10-PCS | Mod: 95,,, | Performed by: SOCIAL WORKER

## 2022-04-13 PROCEDURE — 4010F ACE/ARB THERAPY RXD/TAKEN: CPT | Mod: CPTII,95,, | Performed by: SOCIAL WORKER

## 2022-04-13 PROCEDURE — 4010F PR ACE/ARB THEARPY RXD/TAKEN: ICD-10-PCS | Mod: CPTII,95,, | Performed by: SOCIAL WORKER

## 2022-04-13 NOTE — PROGRESS NOTES
Individual Psychotherapy (PhD/LCSW)    4/13/2022     The patient location is: [place], [place]  The chief complaint leading to consultation is: interpersonal issues    Visit type: audiovisual    Face to Face time with patient: 45 min  60 minutes of total time spent on the encounter, which includes face to face time and non-face to face time preparing to see the patient (eg, review of tests), Obtaining and/or reviewing separately obtained history, Documenting clinical information in the electronic or other health record, Independently interpreting results (not separately reported) and communicating results to the patient/family/caregiver, or Care coordination (not separately reported).         Each patient to whom he or she provides medical services by telemedicine is:  (1) informed of the relationship between the physician and patient and the respective role of any other health care provider with respect to management of the patient; and (2) notified that he or she may decline to receive medical services by telemedicine and may withdraw from such care at any time.    Notes:       Site:  [facility]         Therapeutic Intervention: Met with patient.  Outpatient - Behavior modifying psychotherapy 45 min - CPT code 72234, Outpatient - Supportive psychotherapy 45 min - CPT Code 48007 and Outpatient - Interactive psychotherapy 45 min - CPT code 20534    Chief complaint/reason for encounter: interpersonal     Interval history and content of current session: Pt is a 56 year-old  white female who presents this morning for a follow up therapy session. Pt expresses continued frustration regarding 's lack of follow through, poor communication, and limitied transparency, especially around relationship he is having with another woman. Pt expressing heartbreak around the fact that  may never be able to give her what she needs or wants in their relationship. Pt and  continue to attend couple's  therapy, however pt describes that while he shows up to sessions, he is disengaged with the process and does not commit to doing the homework assigned. Pt acknowledges that she does the work for both of them and is exhausted. Pt planning to have an honest conversation with  this afternoon regarding next steps.      Treatment plan:  · Target symptoms: anxiety , interpersonal issues  · Why chosen therapy is appropriate versus another modality: relevant to diagnosis, patient responds to this modality, evidence based practice  · Outcome monitoring methods: self-report, observation  · Therapeutic intervention type: insight oriented psychotherapy, behavior modifying psychotherapy, supportive psychotherapy    Risk parameters:  Patient reports no suicidal ideation  Patient reports no homicidal ideation  Patient reports no self-injurious behavior  Patient reports no violent behavior    Verbal deficits: None    Patient's response to intervention:  The patient's response to intervention is accepting.    Progress toward goals and other mental status changes:  The patient's progress toward goals is excellent.    Diagnosis:     ICD-10-CM ICD-9-CM   1. Adjustment disorder, unspecified type  F43.20 309.9       Plan:  individual psychotherapy    Return to clinic: 1 week    Length of Service (minutes): 45

## 2022-04-26 ENCOUNTER — OFFICE VISIT (OUTPATIENT)
Dept: PSYCHIATRY | Facility: CLINIC | Age: 57
End: 2022-04-26
Payer: COMMERCIAL

## 2022-04-26 DIAGNOSIS — F43.20 ADJUSTMENT DISORDER, UNSPECIFIED TYPE: Primary | ICD-10-CM

## 2022-04-26 PROCEDURE — 4010F ACE/ARB THERAPY RXD/TAKEN: CPT | Mod: CPTII,S$GLB,, | Performed by: SOCIAL WORKER

## 2022-04-26 PROCEDURE — 90834 PSYTX W PT 45 MINUTES: CPT | Mod: S$GLB,,, | Performed by: SOCIAL WORKER

## 2022-04-26 PROCEDURE — 4010F PR ACE/ARB THEARPY RXD/TAKEN: ICD-10-PCS | Mod: CPTII,S$GLB,, | Performed by: SOCIAL WORKER

## 2022-04-26 PROCEDURE — 90834 PR PSYCHOTHERAPY W/PATIENT, 45 MIN: ICD-10-PCS | Mod: S$GLB,,, | Performed by: SOCIAL WORKER

## 2022-04-26 NOTE — PROGRESS NOTES
Individual Psychotherapy (PhD/LCSW)    4/26/2022     The patient location is: [place], [place]  The chief complaint leading to consultation is: interpersonal issues    Visit type: audiovisual    Face to Face time with patient: 45 min  60 minutes of total time spent on the encounter, which includes face to face time and non-face to face time preparing to see the patient (eg, review of tests), Obtaining and/or reviewing separately obtained history, Documenting clinical information in the electronic or other health record, Independently interpreting results (not separately reported) and communicating results to the patient/family/caregiver, or Care coordination (not separately reported).         Each patient to whom he or she provides medical services by telemedicine is:  (1) informed of the relationship between the physician and patient and the respective role of any other health care provider with respect to management of the patient; and (2) notified that he or she may decline to receive medical services by telemedicine and may withdraw from such care at any time.    Notes:       Site:  [facility]         Therapeutic Intervention: Met with patient.  Outpatient - Behavior modifying psychotherapy 45 min - CPT code 11519, Outpatient - Supportive psychotherapy 45 min - CPT Code 38488 and Outpatient - Interactive psychotherapy 45 min - CPT code 01617    Chief complaint/reason for encounter: interpersonal     Interval history and content of current session: Pt is a 56 year-old  white female who presents this morning for a follow up therapy session. Pt reports tenuous couple of weeks with regard to relationship with . Pt reports that they have been arguing often. Pt states that he did not work on goals prior to last couples session yesterday. Pt reports that yesterday was their final couples session as clinician did not feel that the therapy was useful due to their struggle with shared goals. Pt reports  that she is closer to making a decision on formalizing divorce but has some more thinking to do. Pt setting more boundaries around answering 's phone calls and texts and is feeling like there is some movement out of limbo.     Treatment plan:  · Target symptoms: anxiety , interpersonal issues  · Why chosen therapy is appropriate versus another modality: relevant to diagnosis, patient responds to this modality, evidence based practice  · Outcome monitoring methods: self-report, observation  · Therapeutic intervention type: insight oriented psychotherapy, behavior modifying psychotherapy, supportive psychotherapy    Risk parameters:  Patient reports no suicidal ideation  Patient reports no homicidal ideation  Patient reports no self-injurious behavior  Patient reports no violent behavior    Verbal deficits: None    Patient's response to intervention:  The patient's response to intervention is accepting.    Progress toward goals and other mental status changes:  The patient's progress toward goals is excellent.    Diagnosis:     ICD-10-CM ICD-9-CM   1. Adjustment disorder, unspecified type  F43.20 309.9       Plan:  individual psychotherapy    Return to clinic: 1 week    Length of Service (minutes): 45

## 2022-05-01 ENCOUNTER — PATIENT MESSAGE (OUTPATIENT)
Dept: PSYCHIATRY | Facility: CLINIC | Age: 57
End: 2022-05-01
Payer: COMMERCIAL

## 2022-05-11 ENCOUNTER — OFFICE VISIT (OUTPATIENT)
Dept: PSYCHIATRY | Facility: CLINIC | Age: 57
End: 2022-05-11
Payer: COMMERCIAL

## 2022-05-11 DIAGNOSIS — F43.20 ADJUSTMENT DISORDER, UNSPECIFIED TYPE: Primary | ICD-10-CM

## 2022-05-11 PROCEDURE — 4010F ACE/ARB THERAPY RXD/TAKEN: CPT | Mod: CPTII,S$GLB,, | Performed by: SOCIAL WORKER

## 2022-05-11 PROCEDURE — 90834 PR PSYCHOTHERAPY W/PATIENT, 45 MIN: ICD-10-PCS | Mod: S$GLB,,, | Performed by: SOCIAL WORKER

## 2022-05-11 PROCEDURE — 4010F PR ACE/ARB THEARPY RXD/TAKEN: ICD-10-PCS | Mod: CPTII,S$GLB,, | Performed by: SOCIAL WORKER

## 2022-05-11 PROCEDURE — 90834 PSYTX W PT 45 MINUTES: CPT | Mod: S$GLB,,, | Performed by: SOCIAL WORKER

## 2022-05-11 NOTE — PROGRESS NOTES
Individual Psychotherapy (PhD/LCSW)    5/11/2022     The patient location is: [place], [place]  The chief complaint leading to consultation is: interpersonal issues    Visit type: audiovisual    Face to Face time with patient: 45 min  60 minutes of total time spent on the encounter, which includes face to face time and non-face to face time preparing to see the patient (eg, review of tests), Obtaining and/or reviewing separately obtained history, Documenting clinical information in the electronic or other health record, Independently interpreting results (not separately reported) and communicating results to the patient/family/caregiver, or Care coordination (not separately reported).         Each patient to whom he or she provides medical services by telemedicine is:  (1) informed of the relationship between the physician and patient and the respective role of any other health care provider with respect to management of the patient; and (2) notified that he or she may decline to receive medical services by telemedicine and may withdraw from such care at any time.    Notes:       Site:  [facility]         Therapeutic Intervention: Met with patient.  Outpatient - Behavior modifying psychotherapy 45 min - CPT code 15269, Outpatient - Supportive psychotherapy 45 min - CPT Code 09204 and Outpatient - Interactive psychotherapy 45 min - CPT code 97085    Chief complaint/reason for encounter: interpersonal     Interval history and content of current session: Pt is a 56 year-old  white female who presents this morning for a follow up therapy session. Pt appears bright, less stressed. Dgtr [place] in town. Pt continues to describe difficulties with communication between her and . Pt has been better about setting boundaries. Pt appearing more open to the fact that things with  may not work out. Pt making plans with her friends, working on a project with [employer], and practicing more self care.      Treatment plan:  · Target symptoms: anxiety , interpersonal issues  · Why chosen therapy is appropriate versus another modality: relevant to diagnosis, patient responds to this modality, evidence based practice  · Outcome monitoring methods: self-report, observation  · Therapeutic intervention type: insight oriented psychotherapy, behavior modifying psychotherapy, supportive psychotherapy    Risk parameters:  Patient reports no suicidal ideation  Patient reports no homicidal ideation  Patient reports no self-injurious behavior  Patient reports no violent behavior    Verbal deficits: None    Patient's response to intervention:  The patient's response to intervention is accepting.    Progress toward goals and other mental status changes:  The patient's progress toward goals is excellent.    Diagnosis:     ICD-10-CM ICD-9-CM   1. Adjustment disorder, unspecified type  F43.20 309.9       Plan:  individual psychotherapy    Return to clinic: 1 week    Length of Service (minutes): 45

## 2022-05-24 ENCOUNTER — OFFICE VISIT (OUTPATIENT)
Dept: PSYCHIATRY | Facility: CLINIC | Age: 57
End: 2022-05-24
Payer: COMMERCIAL

## 2022-05-24 DIAGNOSIS — F43.20 ADJUSTMENT DISORDER, UNSPECIFIED TYPE: Primary | ICD-10-CM

## 2022-05-24 PROCEDURE — 4010F ACE/ARB THERAPY RXD/TAKEN: CPT | Mod: CPTII,S$GLB,, | Performed by: SOCIAL WORKER

## 2022-05-24 PROCEDURE — 90834 PR PSYCHOTHERAPY W/PATIENT, 45 MIN: ICD-10-PCS | Mod: S$GLB,,, | Performed by: SOCIAL WORKER

## 2022-05-24 PROCEDURE — 4010F PR ACE/ARB THEARPY RXD/TAKEN: ICD-10-PCS | Mod: CPTII,S$GLB,, | Performed by: SOCIAL WORKER

## 2022-05-24 PROCEDURE — 90834 PSYTX W PT 45 MINUTES: CPT | Mod: S$GLB,,, | Performed by: SOCIAL WORKER

## 2022-06-03 ENCOUNTER — PATIENT MESSAGE (OUTPATIENT)
Dept: PSYCHIATRY | Facility: CLINIC | Age: 57
End: 2022-06-03
Payer: COMMERCIAL

## 2022-06-07 ENCOUNTER — OFFICE VISIT (OUTPATIENT)
Dept: PSYCHIATRY | Facility: CLINIC | Age: 57
End: 2022-06-07
Payer: COMMERCIAL

## 2022-06-07 DIAGNOSIS — F43.20 ADJUSTMENT DISORDER, UNSPECIFIED TYPE: Primary | ICD-10-CM

## 2022-06-07 PROCEDURE — 90834 PR PSYCHOTHERAPY W/PATIENT, 45 MIN: ICD-10-PCS | Mod: S$GLB,,, | Performed by: SOCIAL WORKER

## 2022-06-07 PROCEDURE — 4010F PR ACE/ARB THEARPY RXD/TAKEN: ICD-10-PCS | Mod: CPTII,S$GLB,, | Performed by: SOCIAL WORKER

## 2022-06-07 PROCEDURE — 90834 PSYTX W PT 45 MINUTES: CPT | Mod: S$GLB,,, | Performed by: SOCIAL WORKER

## 2022-06-07 PROCEDURE — 4010F ACE/ARB THERAPY RXD/TAKEN: CPT | Mod: CPTII,S$GLB,, | Performed by: SOCIAL WORKER

## 2022-06-07 NOTE — PROGRESS NOTES
Individual Psychotherapy (PhD/LCSW)    6/7/2022     The patient location is: [place], [place]  The chief complaint leading to consultation is: interpersonal issues    Visit type: audiovisual    Face to Face time with patient: 45 min  60 minutes of total time spent on the encounter, which includes face to face time and non-face to face time preparing to see the patient (eg, review of tests), Obtaining and/or reviewing separately obtained history, Documenting clinical information in the electronic or other health record, Independently interpreting results (not separately reported) and communicating results to the patient/family/caregiver, or Care coordination (not separately reported).         Each patient to whom he or she provides medical services by telemedicine is:  (1) informed of the relationship between the physician and patient and the respective role of any other health care provider with respect to management of the patient; and (2) notified that he or she may decline to receive medical services by telemedicine and may withdraw from such care at any time.    Notes:       Site:  [facility]         Therapeutic Intervention: Met with patient.  Outpatient - Behavior modifying psychotherapy 45 min - CPT code 25742, Outpatient - Supportive psychotherapy 45 min - CPT Code 68215 and Outpatient - Interactive psychotherapy 45 min - CPT code 29852    Chief complaint/reason for encounter: interpersonal     Interval history and content of current session: Pt is a 56 year-old  white female who presents this morning for a follow up therapy session. Pt reports that things feel settled for now. Pt and  taking a trip to [place] to visit their children. Dgtr is in an internship in [place], son lives in [place]. Pt reports that she and  have discussed a no-strings attached approach so that there are no expectations. Pt voicing some concerns of leaving her mother in [place], however she plans to  provide emergency contacts for mother to reach out to if needed. Pt endorses being more open with friends about the separation and appears more adjusted.     Treatment plan:  · Target symptoms: anxiety , interpersonal issues  · Why chosen therapy is appropriate versus another modality: relevant to diagnosis, patient responds to this modality, evidence based practice  · Outcome monitoring methods: self-report, observation  · Therapeutic intervention type: insight oriented psychotherapy, behavior modifying psychotherapy, supportive psychotherapy    Risk parameters:  Patient reports no suicidal ideation  Patient reports no homicidal ideation  Patient reports no self-injurious behavior  Patient reports no violent behavior    Verbal deficits: None    Patient's response to intervention:  The patient's response to intervention is accepting.    Progress toward goals and other mental status changes:  The patient's progress toward goals is excellent.    Diagnosis:     ICD-10-CM ICD-9-CM   1. Adjustment disorder, unspecified type  F43.20 309.9       Plan:  individual psychotherapy    Return to clinic: 2 weeks    Length of Service (minutes): 45

## 2022-06-18 ENCOUNTER — PATIENT MESSAGE (OUTPATIENT)
Dept: PSYCHIATRY | Facility: CLINIC | Age: 57
End: 2022-06-18
Payer: COMMERCIAL

## 2022-06-28 ENCOUNTER — OFFICE VISIT (OUTPATIENT)
Dept: PSYCHIATRY | Facility: CLINIC | Age: 57
End: 2022-06-28
Payer: COMMERCIAL

## 2022-06-28 DIAGNOSIS — F43.20 ADJUSTMENT DISORDER, UNSPECIFIED TYPE: Primary | ICD-10-CM

## 2022-06-28 PROCEDURE — 4010F ACE/ARB THERAPY RXD/TAKEN: CPT | Mod: CPTII,95,, | Performed by: SOCIAL WORKER

## 2022-06-28 PROCEDURE — 4010F PR ACE/ARB THEARPY RXD/TAKEN: ICD-10-PCS | Mod: CPTII,95,, | Performed by: SOCIAL WORKER

## 2022-06-28 PROCEDURE — 90834 PR PSYCHOTHERAPY W/PATIENT, 45 MIN: ICD-10-PCS | Mod: 95,,, | Performed by: SOCIAL WORKER

## 2022-06-28 PROCEDURE — 90834 PSYTX W PT 45 MINUTES: CPT | Mod: 95,,, | Performed by: SOCIAL WORKER

## 2022-06-28 NOTE — PROGRESS NOTES
Individual Psychotherapy (PhD/LCSW)    6/28/2022     The patient location is: [place], [place]  The chief complaint leading to consultation is: interpersonal issues    Visit type: audiovisual    Face to Face time with patient: 45 min  60 minutes of total time spent on the encounter, which includes face to face time and non-face to face time preparing to see the patient (eg, review of tests), Obtaining and/or reviewing separately obtained history, Documenting clinical information in the electronic or other health record, Independently interpreting results (not separately reported) and communicating results to the patient/family/caregiver, or Care coordination (not separately reported).         Each patient to whom he or she provides medical services by telemedicine is:  (1) informed of the relationship between the physician and patient and the respective role of any other health care provider with respect to management of the patient; and (2) notified that he or she may decline to receive medical services by telemedicine and may withdraw from such care at any time.    Notes:       Site:  [facility]         Therapeutic Intervention: Met with patient.  Outpatient - Behavior modifying psychotherapy 45 min - CPT code 88376, Outpatient - Supportive psychotherapy 45 min - CPT Code 23799 and Outpatient - Interactive psychotherapy 45 min - CPT code 37772    Chief complaint/reason for encounter: interpersonal     Interval history and content of current session: Pt is a 57 year-old  white female who presents today for a follow up therapy session. Pt reports continued interpersonal issues with . Pt spent time discussing strategies for next steps regarding marriage. Pt appropriately tearful when processing the situation. Clinician providing validation, emotional support, active listening.     Treatment plan:  · Target symptoms: anxiety , interpersonal issues  · Why chosen therapy is appropriate versus  another modality: relevant to diagnosis, patient responds to this modality, evidence based practice  · Outcome monitoring methods: self-report, observation  · Therapeutic intervention type: insight oriented psychotherapy, behavior modifying psychotherapy, supportive psychotherapy    Risk parameters:  Patient reports no suicidal ideation  Patient reports no homicidal ideation  Patient reports no self-injurious behavior  Patient reports no violent behavior    Verbal deficits: None    Patient's response to intervention:  The patient's response to intervention is accepting.    Progress toward goals and other mental status changes:  The patient's progress toward goals is excellent.    Diagnosis:     ICD-10-CM ICD-9-CM   1. Adjustment disorder, unspecified type  F43.20 309.9       Plan:  individual psychotherapy    Return to clinic: 2 weeks    Length of Service (minutes): 45

## 2022-07-05 ENCOUNTER — OFFICE VISIT (OUTPATIENT)
Dept: PSYCHIATRY | Facility: CLINIC | Age: 57
End: 2022-07-05
Payer: COMMERCIAL

## 2022-07-05 DIAGNOSIS — F43.20 ADJUSTMENT DISORDER, UNSPECIFIED TYPE: Primary | ICD-10-CM

## 2022-07-05 PROCEDURE — 4010F PR ACE/ARB THEARPY RXD/TAKEN: ICD-10-PCS | Mod: CPTII,S$GLB,, | Performed by: SOCIAL WORKER

## 2022-07-05 PROCEDURE — 90834 PSYTX W PT 45 MINUTES: CPT | Mod: S$GLB,,, | Performed by: SOCIAL WORKER

## 2022-07-05 PROCEDURE — 90834 PR PSYCHOTHERAPY W/PATIENT, 45 MIN: ICD-10-PCS | Mod: S$GLB,,, | Performed by: SOCIAL WORKER

## 2022-07-05 PROCEDURE — 4010F ACE/ARB THERAPY RXD/TAKEN: CPT | Mod: CPTII,S$GLB,, | Performed by: SOCIAL WORKER

## 2022-07-05 NOTE — PROGRESS NOTES
Individual Psychotherapy (PhD/LCSW)    7/5/2022     Site:  [facility]         Therapeutic Intervention: Met with patient.  Outpatient - Behavior modifying psychotherapy 45 min - CPT code 52374, Outpatient - Supportive psychotherapy 45 min - CPT Code 03756 and Outpatient - Interactive psychotherapy 45 min - CPT code 23035    Chief complaint/reason for encounter: interpersonal     Interval history and content of current session: Pt is a 57 year-old  white female who presents today for a follow up therapy session. Pt reports that she formally filed for divorce from  last week. Pt notified him, their children and other family and friends. Pt still grieving the relationship as is appropriate, still getting into circular arguments with . Pt's goal is for their relationship to remain amicable. Pt concerned about college aged dgtr who is having a hard time with the divorce but also with other issues related to school/internship. Pt appears to be processing situation appropriately. Today's session centered around how to set boundaries with  when he texts about the relationship.     Treatment plan:  · Target symptoms: anxiety , interpersonal issues  · Why chosen therapy is appropriate versus another modality: relevant to diagnosis, patient responds to this modality, evidence based practice  · Outcome monitoring methods: self-report, observation  · Therapeutic intervention type: insight oriented psychotherapy, behavior modifying psychotherapy, supportive psychotherapy    Risk parameters:  Patient reports no suicidal ideation  Patient reports no homicidal ideation  Patient reports no self-injurious behavior  Patient reports no violent behavior    Verbal deficits: None    Patient's response to intervention:  The patient's response to intervention is accepting.    Progress toward goals and other mental status changes:  The patient's progress toward goals is excellent.    Diagnosis:      ICD-10-CM ICD-9-CM   1. Adjustment disorder, unspecified type  F43.20 309.9       Plan:  individual psychotherapy    Return to clinic: 2 weeks    Length of Service (minutes): 45

## 2022-07-06 ENCOUNTER — PATIENT MESSAGE (OUTPATIENT)
Dept: PSYCHIATRY | Facility: CLINIC | Age: 57
End: 2022-07-06
Payer: COMMERCIAL

## 2022-07-12 ENCOUNTER — OFFICE VISIT (OUTPATIENT)
Dept: PSYCHIATRY | Facility: CLINIC | Age: 57
End: 2022-07-12
Payer: COMMERCIAL

## 2022-07-12 DIAGNOSIS — F43.20 ADJUSTMENT DISORDER, UNSPECIFIED TYPE: Primary | ICD-10-CM

## 2022-07-12 PROCEDURE — 4010F PR ACE/ARB THEARPY RXD/TAKEN: ICD-10-PCS | Mod: CPTII,S$GLB,, | Performed by: SOCIAL WORKER

## 2022-07-12 PROCEDURE — 90834 PR PSYCHOTHERAPY W/PATIENT, 45 MIN: ICD-10-PCS | Mod: S$GLB,,, | Performed by: SOCIAL WORKER

## 2022-07-12 PROCEDURE — 90834 PSYTX W PT 45 MINUTES: CPT | Mod: S$GLB,,, | Performed by: SOCIAL WORKER

## 2022-07-12 PROCEDURE — 4010F ACE/ARB THERAPY RXD/TAKEN: CPT | Mod: CPTII,S$GLB,, | Performed by: SOCIAL WORKER

## 2022-07-12 NOTE — PROGRESS NOTES
Individual Psychotherapy (PhD/LCSW)    7/12/2022     Site:  [facility]         Therapeutic Intervention: Met with patient.  Outpatient - Behavior modifying psychotherapy 45 min - CPT code 63083, Outpatient - Supportive psychotherapy 45 min - CPT Code 43377 and Outpatient - Interactive psychotherapy 45 min - CPT code 43854    Chief complaint/reason for encounter: interpersonal     Interval history and content of current session: Pt is a 57 year-old  white female who presents today for a follow up therapy session. Pt appearing AAOx4, pleasant and engaged. Pt reports that she is feeling less anxious about the decision to file. Pt reports that she still maintains consistent communication with  but also recognizes that his patterns of dysfunctional communication have not changed. Pt endorses that this has validated her decision to file. Pt reports that she has told more people about their separation and is feeling a little more comfortable going out into social situations knowing that people might inquire. Pt to continue to focus on addressing herself in the relationship with de-emphasis on 's actions and behaviors.     Treatment plan:  · Target symptoms: anxiety , interpersonal issues  · Why chosen therapy is appropriate versus another modality: relevant to diagnosis, patient responds to this modality, evidence based practice  · Outcome monitoring methods: self-report, observation  · Therapeutic intervention type: insight oriented psychotherapy, behavior modifying psychotherapy, supportive psychotherapy    Risk parameters:  Patient reports no suicidal ideation  Patient reports no homicidal ideation  Patient reports no self-injurious behavior  Patient reports no violent behavior    Verbal deficits: None    Patient's response to intervention:  The patient's response to intervention is accepting.    Progress toward goals and other mental status changes:  The patient's progress toward goals is  excellent.    Diagnosis:     ICD-10-CM ICD-9-CM   1. Adjustment disorder, unspecified type  F43.20 309.9       Plan:  individual psychotherapy    Return to clinic: 2 weeks    Length of Service (minutes): 45

## 2022-07-14 ENCOUNTER — PATIENT MESSAGE (OUTPATIENT)
Dept: OTHER | Facility: OTHER | Age: 57
End: 2022-07-14
Payer: COMMERCIAL

## 2022-07-26 ENCOUNTER — OFFICE VISIT (OUTPATIENT)
Dept: PSYCHIATRY | Facility: CLINIC | Age: 57
End: 2022-07-26
Payer: COMMERCIAL

## 2022-07-26 DIAGNOSIS — F43.20 ADJUSTMENT DISORDER, UNSPECIFIED TYPE: Primary | ICD-10-CM

## 2022-07-26 PROCEDURE — 90834 PSYTX W PT 45 MINUTES: CPT | Mod: S$GLB,,, | Performed by: SOCIAL WORKER

## 2022-07-26 PROCEDURE — 90834 PR PSYCHOTHERAPY W/PATIENT, 45 MIN: ICD-10-PCS | Mod: S$GLB,,, | Performed by: SOCIAL WORKER

## 2022-07-26 PROCEDURE — 4010F ACE/ARB THERAPY RXD/TAKEN: CPT | Mod: CPTII,S$GLB,, | Performed by: SOCIAL WORKER

## 2022-07-26 PROCEDURE — 4010F PR ACE/ARB THEARPY RXD/TAKEN: ICD-10-PCS | Mod: CPTII,S$GLB,, | Performed by: SOCIAL WORKER

## 2022-07-26 NOTE — PROGRESS NOTES
"  Individual Psychotherapy (PhD/LCSW)    7/26/2022     Site:  [facility]         Therapeutic Intervention: Met with patient.  Outpatient - Behavior modifying psychotherapy 45 min - CPT code 69212, Outpatient - Supportive psychotherapy 45 min - CPT Code 25524 and Outpatient - Interactive psychotherapy 45 min - CPT code 35142    Chief complaint/reason for encounter: interpersonal     Interval history and content of current session: Pt is a 57 year-old  white female who presents today for a follow up therapy session. Pt appearing AAOx4, pleasant and engaged. Pt and clinician spent majority of session processing pt's sadness around the fact that  still has not attempted to make any changes in how he communicates with pt. Pt still feels that she is not a priority despite the fact that they have filed for divorce. Clinician validated pt's experience, especially pt's anxiety and sadness around her sense that he has "already moved on." Pt feeling in limbo right now as they do not go to court for another three months. Discussed ways pt can fill herself with things that increase her parminder, possibly a trip to visit a friend in [place], spending more time with friends here in [place].       Treatment plan:  · Target symptoms: anxiety , interpersonal issues  · Why chosen therapy is appropriate versus another modality: relevant to diagnosis, patient responds to this modality, evidence based practice  · Outcome monitoring methods: self-report, observation  · Therapeutic intervention type: insight oriented psychotherapy, behavior modifying psychotherapy, supportive psychotherapy    Risk parameters:  Patient reports no suicidal ideation  Patient reports no homicidal ideation  Patient reports no self-injurious behavior  Patient reports no violent behavior    Verbal deficits: None    Patient's response to intervention:  The patient's response to intervention is accepting.    Progress toward goals and other mental " status changes:  The patient's progress toward goals is excellent.    Diagnosis:     ICD-10-CM ICD-9-CM   1. Adjustment disorder, unspecified type  F43.20 309.9       Plan:  individual psychotherapy    Return to clinic: 2 weeks    Length of Service (minutes): 45

## 2022-08-25 ENCOUNTER — PATIENT MESSAGE (OUTPATIENT)
Dept: ADMINISTRATIVE | Facility: OTHER | Age: 57
End: 2022-08-25
Payer: COMMERCIAL

## 2022-09-13 ENCOUNTER — OFFICE VISIT (OUTPATIENT)
Dept: PSYCHIATRY | Facility: CLINIC | Age: 57
End: 2022-09-13
Payer: COMMERCIAL

## 2022-09-13 DIAGNOSIS — F43.20 ADJUSTMENT DISORDER, UNSPECIFIED TYPE: Primary | ICD-10-CM

## 2022-09-13 PROCEDURE — 90837 PSYTX W PT 60 MINUTES: CPT | Mod: S$GLB,,, | Performed by: SOCIAL WORKER

## 2022-09-13 PROCEDURE — 4010F PR ACE/ARB THEARPY RXD/TAKEN: ICD-10-PCS | Mod: CPTII,S$GLB,, | Performed by: SOCIAL WORKER

## 2022-09-13 PROCEDURE — 4010F ACE/ARB THERAPY RXD/TAKEN: CPT | Mod: CPTII,S$GLB,, | Performed by: SOCIAL WORKER

## 2022-09-13 PROCEDURE — 90837 PR PSYCHOTHERAPY W/PATIENT, 60 MIN: ICD-10-PCS | Mod: S$GLB,,, | Performed by: SOCIAL WORKER

## 2022-09-13 NOTE — PROGRESS NOTES
Individual Psychotherapy (PhD/LCSW)    9/13/2022     Site:  [facility]         Therapeutic Intervention: Met with patient.  Outpatient - Behavior modifying psychotherapy  60 min - CPT code 51605, Outpatient - Supportive psychotherapy 60 min - CPT Code 76922 and Outpatient - Interactive psychotherapy 60 min - CPT code 48960    Chief complaint/reason for encounter: interpersonal     Interval history and content of current session: Pt is a 57 year-old  white female who presents today for a follow up therapy session. Pt appearing AAOx4, pleasant and engaged. Pt reports continued confusion and anxiety related to relationship with . Still likely proceeding with divorce. Pt struggling with the idea that she has failed if marriage does not remain intact. Pt recognizes this. Pt describes texts from  that appear to more accepting of situation leading to divorce. Pt meeting with him this weekend and will likely agree to focus on their role as co-parents/friends instead of trying to force the marriage to evolve into something that it cannot. Clinician provided a space to help pt process feelings of betrayal and anxiety related to the unknown.       Treatment plan:  Target symptoms: anxiety , interpersonal issues  Why chosen therapy is appropriate versus another modality: relevant to diagnosis, patient responds to this modality, evidence based practice  Outcome monitoring methods: self-report, observation  Therapeutic intervention type: insight oriented psychotherapy, behavior modifying psychotherapy, supportive psychotherapy    Risk parameters:  Patient reports no suicidal ideation  Patient reports no homicidal ideation  Patient reports no self-injurious behavior  Patient reports no violent behavior    Verbal deficits: None    Patient's response to intervention:  The patient's response to intervention is accepting.    Progress toward goals and other mental status changes:  The patient's progress  toward goals is excellent.    Diagnosis:     ICD-10-CM ICD-9-CM   1. Adjustment disorder, unspecified type  F43.20 309.9       Plan:  individual psychotherapy    Return to clinic: 2 weeks    Length of Service (minutes): 60

## 2022-09-19 ENCOUNTER — OFFICE VISIT (OUTPATIENT)
Dept: OPTOMETRY | Facility: CLINIC | Age: 57
End: 2022-09-19
Payer: COMMERCIAL

## 2022-09-19 DIAGNOSIS — H25.13 NUCLEAR SCLEROSIS, BILATERAL: Primary | ICD-10-CM

## 2022-09-19 DIAGNOSIS — H52.4 HYPEROPIA WITH PRESBYOPIA OF BOTH EYES: ICD-10-CM

## 2022-09-19 DIAGNOSIS — H52.03 HYPEROPIA WITH PRESBYOPIA OF BOTH EYES: ICD-10-CM

## 2022-09-19 PROCEDURE — 92014 PR EYE EXAM, EST PATIENT,COMPREHESV: ICD-10-PCS | Mod: S$GLB,,, | Performed by: OPTOMETRIST

## 2022-09-19 PROCEDURE — 92014 COMPRE OPH EXAM EST PT 1/>: CPT | Mod: S$GLB,,, | Performed by: OPTOMETRIST

## 2022-09-19 PROCEDURE — 99999 PR PBB SHADOW E&M-EST. PATIENT-LVL II: CPT | Mod: PBBFAC,,, | Performed by: OPTOMETRIST

## 2022-09-19 PROCEDURE — 99999 PR PBB SHADOW E&M-EST. PATIENT-LVL II: ICD-10-PCS | Mod: PBBFAC,,, | Performed by: OPTOMETRIST

## 2022-09-19 PROCEDURE — 92015 PR REFRACTION: ICD-10-PCS | Mod: S$GLB,,, | Performed by: OPTOMETRIST

## 2022-09-19 PROCEDURE — 92310 CONTACT LENS FITTING OU: CPT | Mod: CSM,S$GLB,, | Performed by: OPTOMETRIST

## 2022-09-19 PROCEDURE — 1159F MED LIST DOCD IN RCRD: CPT | Mod: CPTII,S$GLB,, | Performed by: OPTOMETRIST

## 2022-09-19 PROCEDURE — 4010F PR ACE/ARB THEARPY RXD/TAKEN: ICD-10-PCS | Mod: CPTII,S$GLB,, | Performed by: OPTOMETRIST

## 2022-09-19 PROCEDURE — 4010F ACE/ARB THERAPY RXD/TAKEN: CPT | Mod: CPTII,S$GLB,, | Performed by: OPTOMETRIST

## 2022-09-19 PROCEDURE — 1159F PR MEDICATION LIST DOCUMENTED IN MEDICAL RECORD: ICD-10-PCS | Mod: CPTII,S$GLB,, | Performed by: OPTOMETRIST

## 2022-09-19 PROCEDURE — 92015 DETERMINE REFRACTIVE STATE: CPT | Mod: S$GLB,,, | Performed by: OPTOMETRIST

## 2022-09-19 PROCEDURE — 92310 PR CONTACT LENS FITTING (NO CHANGE): ICD-10-PCS | Mod: CSM,S$GLB,, | Performed by: OPTOMETRIST

## 2022-09-19 NOTE — PROGRESS NOTES
HPI    Annual eye exam   Failed PALs   Interested in trying CLs       EYEMEDS: None      (+) Dry Eyes  (-) Itching   (-) Burning   (-) Tearing   (+) Redness  (-) Eye pain  (-) Flashes  (-) Floaters   (-) Double vision   (-) Photophobia  (+) Glare   (-) Headaches        Last edited by Mark Gonzalez, GOPAL on 9/19/2022  1:18 PM.            Assessment /Plan     For exam results, see Encounter Report.    Nuclear sclerosis, bilateral  -Educated patient on presence of cataracts at today's exam, monitor at annual dilated fundus exam. 8+ years surgical estimate.    Hyperopia with presbyopia of both eyes  Eyeglass Final Rx       Eyeglass Final Rx         Sphere Cylinder Dist VA Add    Right +1.25 Sphere 20/20 +2.00    Left +1.50 Sphere 20/20 +2.00      Expiration Date: 9/19/2023                  Contact Lens Current Rx       Current Contact Lens Rx         Brand Base Curve Diameter Sphere Cylinder Add Dist VA Near VA    Right AV 1 day MAX Multifocal 8.4 14.3 +1.25 Sphere med 20/20- J2    Left AV 1 day MAX Multifocal 8.4 14.3 +1.50 Sphere med 20/20 J2                  Train with Bev in CL room, DW, Dailies, Systane Complete PF  1 wk Mychart/Phrev      RTC 1 yr

## 2022-09-22 ENCOUNTER — TELEPHONE (OUTPATIENT)
Dept: OPTOMETRY | Facility: CLINIC | Age: 57
End: 2022-09-22
Payer: COMMERCIAL

## 2022-09-22 ENCOUNTER — PATIENT MESSAGE (OUTPATIENT)
Dept: OPTOMETRY | Facility: CLINIC | Age: 57
End: 2022-09-22
Payer: COMMERCIAL

## 2022-09-27 ENCOUNTER — OFFICE VISIT (OUTPATIENT)
Dept: PSYCHIATRY | Facility: CLINIC | Age: 57
End: 2022-09-27
Payer: COMMERCIAL

## 2022-09-27 DIAGNOSIS — F43.20 ADJUSTMENT DISORDER, UNSPECIFIED TYPE: Primary | ICD-10-CM

## 2022-09-27 PROCEDURE — 4010F PR ACE/ARB THEARPY RXD/TAKEN: ICD-10-PCS | Mod: CPTII,S$GLB,, | Performed by: SOCIAL WORKER

## 2022-09-27 PROCEDURE — 90834 PSYTX W PT 45 MINUTES: CPT | Mod: S$GLB,,, | Performed by: SOCIAL WORKER

## 2022-09-27 PROCEDURE — 90834 PR PSYCHOTHERAPY W/PATIENT, 45 MIN: ICD-10-PCS | Mod: S$GLB,,, | Performed by: SOCIAL WORKER

## 2022-09-27 PROCEDURE — 4010F ACE/ARB THERAPY RXD/TAKEN: CPT | Mod: CPTII,S$GLB,, | Performed by: SOCIAL WORKER

## 2022-09-27 NOTE — PROGRESS NOTES
Individual Psychotherapy (PhD/LCSW)    9/27/2022     Site:  [facility]         Therapeutic Intervention: Met with patient.  Outpatient - Behavior modifying psychotherapy  60 min - CPT code 12036, Outpatient - Supportive psychotherapy 60 min - CPT Code 78795 and Outpatient - Interactive psychotherapy 60 min - CPT code 94074    Chief complaint/reason for encounter: interpersonal     Interval history and content of current session: Pt is a 57 year-old  white female who presents today for a follow up therapy session. Pt appearing AAOx4, pleasant and engaged. Pt reports feelings of loneliness related to her separation and pending divorce. Pt worried about how separation and divorce may affect her children and is trying to figure out how to gain closure with . Pt also navigating how to move forward with the holidays while staying amicable toward . Clinician providing validation and normalization of pt's experience and guidance/strategies for how to gain closure for herself even if  uninvolved in that process.      Treatment plan:  Target symptoms: anxiety , interpersonal issues  Why chosen therapy is appropriate versus another modality: relevant to diagnosis, patient responds to this modality, evidence based practice  Outcome monitoring methods: self-report, observation  Therapeutic intervention type: insight oriented psychotherapy, behavior modifying psychotherapy, supportive psychotherapy    Risk parameters:  Patient reports no suicidal ideation  Patient reports no homicidal ideation  Patient reports no self-injurious behavior  Patient reports no violent behavior    Verbal deficits: None    Patient's response to intervention:  The patient's response to intervention is accepting.    Progress toward goals and other mental status changes:  The patient's progress toward goals is excellent.    Diagnosis:     ICD-10-CM ICD-9-CM   1. Adjustment disorder, unspecified type  F43.20 309.9        Plan:  individual psychotherapy    Return to clinic: 2 weeks    Length of Service (minutes): 60

## 2022-10-06 ENCOUNTER — IMMUNIZATION (OUTPATIENT)
Dept: INTERNAL MEDICINE | Facility: CLINIC | Age: 57
End: 2022-10-06
Payer: COMMERCIAL

## 2022-10-06 DIAGNOSIS — Z23 NEED FOR VACCINATION: Primary | ICD-10-CM

## 2022-10-06 PROCEDURE — 0124A PR IMMUNIZ ADMIN, SARS-COV- 2 COVID-19 VACCINE, 30MCG/0.3ML, BIVALENT, BOOSTER DOSE: ICD-10-PCS | Mod: S$GLB,,, | Performed by: INTERNAL MEDICINE

## 2022-10-06 PROCEDURE — 0124A PR IMMUNIZ ADMIN, SARS-COV- 2 COVID-19 VACCINE, 30MCG/0.3ML, BIVALENT, BOOSTER DOSE: CPT | Mod: S$GLB,,, | Performed by: INTERNAL MEDICINE

## 2022-10-06 PROCEDURE — 91312 COVID-19, MRNA, LNP-S, BIVALENT BOOSTER, PF, 30 MCG/0.3 ML DOSE: ICD-10-PCS | Mod: S$GLB,,, | Performed by: INTERNAL MEDICINE

## 2022-10-06 PROCEDURE — 91312 COVID-19, MRNA, LNP-S, BIVALENT BOOSTER, PF, 30 MCG/0.3 ML DOSE: CPT | Mod: S$GLB,,, | Performed by: INTERNAL MEDICINE

## 2022-10-06 PROCEDURE — 0124A COVID-19, MRNA, LNP-S, BIVALENT BOOSTER, PF, 30 MCG/0.3 ML DOSE: CPT | Mod: CV19,PBBFAC | Performed by: INTERNAL MEDICINE

## 2022-10-10 ENCOUNTER — PATIENT MESSAGE (OUTPATIENT)
Dept: PSYCHIATRY | Facility: CLINIC | Age: 57
End: 2022-10-10
Payer: COMMERCIAL

## 2022-10-11 ENCOUNTER — OFFICE VISIT (OUTPATIENT)
Dept: PSYCHIATRY | Facility: CLINIC | Age: 57
End: 2022-10-11
Payer: COMMERCIAL

## 2022-10-11 DIAGNOSIS — F43.20 ADJUSTMENT DISORDER, UNSPECIFIED TYPE: Primary | ICD-10-CM

## 2022-10-11 PROCEDURE — 4010F ACE/ARB THERAPY RXD/TAKEN: CPT | Mod: CPTII,S$GLB,, | Performed by: SOCIAL WORKER

## 2022-10-11 PROCEDURE — 90834 PSYTX W PT 45 MINUTES: CPT | Mod: S$GLB,,, | Performed by: SOCIAL WORKER

## 2022-10-11 PROCEDURE — 99999 PR PBB SHADOW E&M-EST. PATIENT-LVL I: ICD-10-PCS | Mod: PBBFAC,,, | Performed by: SOCIAL WORKER

## 2022-10-11 PROCEDURE — 99999 PR PBB SHADOW E&M-EST. PATIENT-LVL I: CPT | Mod: PBBFAC,,, | Performed by: SOCIAL WORKER

## 2022-10-11 PROCEDURE — 4010F PR ACE/ARB THEARPY RXD/TAKEN: ICD-10-PCS | Mod: CPTII,S$GLB,, | Performed by: SOCIAL WORKER

## 2022-10-11 PROCEDURE — 90834 PR PSYCHOTHERAPY W/PATIENT, 45 MIN: ICD-10-PCS | Mod: S$GLB,,, | Performed by: SOCIAL WORKER

## 2022-10-11 NOTE — PROGRESS NOTES
Individual Psychotherapy (PhD/LCSW)    10/11/2022     Site:  [facility]         Therapeutic Intervention: Met with patient.  Outpatient - Behavior modifying psychotherapy  60 min - CPT code 97329, Outpatient - Supportive psychotherapy 60 min - CPT Code 57378 and Outpatient - Interactive psychotherapy 60 min - CPT code 47824    Chief complaint/reason for encounter: interpersonal     Interval history and content of current session: Pt is a 57 year-old  white female who presents today for a follow up therapy session. Pt appearing AAOx4, pleasant and engaged. Pt reports son and dgtr visited separately over the last few weeks. Pt having internal conflict about setting firm boundaries about  time  to see the kids. Pt reports that she likes being able to spend time as a family, yet also recognizes that when this occurs, it provides an in for  to manipulate pt back into staying with him. Pt reports that she is meeting him for coffee on Thurs and is willing to set boundaries around the goal of the relationship (spending time together does not mean we are getting back together). Pt still intends to go through with the divorce. Pt,  and their attorneys have a meeting next week to move forward.     Pt continues to make strides socially. She is going to a big event with her friend that will allow her to network and see people without having to be linked to .       Treatment plan:  Target symptoms: anxiety , interpersonal issues  Why chosen therapy is appropriate versus another modality: relevant to diagnosis, patient responds to this modality, evidence based practice  Outcome monitoring methods: self-report, observation  Therapeutic intervention type: insight oriented psychotherapy, behavior modifying psychotherapy, supportive psychotherapy    Risk parameters:  Patient reports no suicidal ideation  Patient reports no homicidal ideation  Patient reports no self-injurious  behavior  Patient reports no violent behavior    Verbal deficits: None    Patient's response to intervention:  The patient's response to intervention is accepting.    Progress toward goals and other mental status changes:  The patient's progress toward goals is excellent.    Diagnosis:     ICD-10-CM ICD-9-CM   1. Adjustment disorder, unspecified type  F43.20 309.9       Plan:  individual psychotherapy    Return to clinic: 2 weeks    Length of Service (minutes): 60

## 2022-10-12 ENCOUNTER — OFFICE VISIT (OUTPATIENT)
Dept: OPTOMETRY | Facility: CLINIC | Age: 57
End: 2022-10-12
Payer: COMMERCIAL

## 2022-10-12 DIAGNOSIS — H52.03 HYPEROPIA WITH PRESBYOPIA OF BOTH EYES: Primary | ICD-10-CM

## 2022-10-12 DIAGNOSIS — H52.4 HYPEROPIA WITH PRESBYOPIA OF BOTH EYES: Primary | ICD-10-CM

## 2022-10-12 PROCEDURE — 92499 UNLISTED OPH SVC/PROCEDURE: CPT | Mod: S$GLB,,, | Performed by: OPTOMETRIST

## 2022-10-12 PROCEDURE — 1159F MED LIST DOCD IN RCRD: CPT | Mod: CPTII,S$GLB,, | Performed by: OPTOMETRIST

## 2022-10-12 PROCEDURE — 4010F ACE/ARB THERAPY RXD/TAKEN: CPT | Mod: CPTII,S$GLB,, | Performed by: OPTOMETRIST

## 2022-10-12 PROCEDURE — 92499 PR CONTACT LENS F/U LEV 1: ICD-10-PCS | Mod: S$GLB,,, | Performed by: OPTOMETRIST

## 2022-10-12 PROCEDURE — 99499 NO LOS: ICD-10-PCS | Mod: S$GLB,,, | Performed by: OPTOMETRIST

## 2022-10-12 PROCEDURE — 99499 UNLISTED E&M SERVICE: CPT | Mod: S$GLB,,, | Performed by: OPTOMETRIST

## 2022-10-12 PROCEDURE — 4010F PR ACE/ARB THEARPY RXD/TAKEN: ICD-10-PCS | Mod: CPTII,S$GLB,, | Performed by: OPTOMETRIST

## 2022-10-12 PROCEDURE — 1159F PR MEDICATION LIST DOCUMENTED IN MEDICAL RECORD: ICD-10-PCS | Mod: CPTII,S$GLB,, | Performed by: OPTOMETRIST

## 2022-10-13 NOTE — PROGRESS NOTES
HPI    CLFU    Pt stated she only wore cl for 4 days  Pt does not have any cl   Pt feeling motion sickness OU when she wear cl  Pt states first time trying cl and wearing multifocal cl  Pt states if she sharpen distance and sharpen near will she have motion   sickness  Pt states want to discuss a different brand of cl  Last edited by Godfrey Snow MA on 10/12/2022 10:09 AM.            Assessment /Plan     For exam results, see Encounter Report.    Hyperopia with presbyopia of both eyes  Dispensed trial #1 (+1.50 DT1) and ordered Max MF in +1.25 ok to final preferred      RTC 1 wk PHREV

## 2022-10-17 ENCOUNTER — PATIENT MESSAGE (OUTPATIENT)
Dept: OPTOMETRY | Facility: CLINIC | Age: 57
End: 2022-10-17
Payer: COMMERCIAL

## 2022-10-20 ENCOUNTER — OFFICE VISIT (OUTPATIENT)
Dept: PSYCHIATRY | Facility: CLINIC | Age: 57
End: 2022-10-20
Payer: COMMERCIAL

## 2022-10-20 ENCOUNTER — PATIENT MESSAGE (OUTPATIENT)
Dept: OPTOMETRY | Facility: CLINIC | Age: 57
End: 2022-10-20
Payer: COMMERCIAL

## 2022-10-20 DIAGNOSIS — F43.20 ADJUSTMENT DISORDER, UNSPECIFIED TYPE: Primary | ICD-10-CM

## 2022-10-20 PROCEDURE — 4010F PR ACE/ARB THEARPY RXD/TAKEN: ICD-10-PCS | Mod: CPTII,S$GLB,, | Performed by: SOCIAL WORKER

## 2022-10-20 PROCEDURE — 90837 PSYTX W PT 60 MINUTES: CPT | Mod: S$GLB,,, | Performed by: SOCIAL WORKER

## 2022-10-20 PROCEDURE — 4010F ACE/ARB THERAPY RXD/TAKEN: CPT | Mod: CPTII,S$GLB,, | Performed by: SOCIAL WORKER

## 2022-10-20 PROCEDURE — 90837 PR PSYCHOTHERAPY W/PATIENT, 60 MIN: ICD-10-PCS | Mod: S$GLB,,, | Performed by: SOCIAL WORKER

## 2022-10-20 NOTE — PROGRESS NOTES
Individual Psychotherapy (PhD/LCSW)    10/20/2022     Site:  [facility]         Therapeutic Intervention: Met with patient.  Outpatient - Behavior modifying psychotherapy  60 min - CPT code 06581, Outpatient - Supportive psychotherapy 60 min - CPT Code 85310 and Outpatient - Interactive psychotherapy 60 min - CPT code 37865    Chief complaint/reason for encounter: interpersonal     Interval history and content of current session: Pt is a 57 year-old  white female who presents today for a follow up therapy session. Pt reports a positive meeting with divorce attorneys and . States that she has a better sense of finances. Pt and  had a good phone conversation regarding some of pt's concerns. Pt reports that he still texts her at inappropriate times with vague messages. Pt and clinician explored setting boundaries ahead of time, whether or not  can follow them. I.e deciding not to  the phone or respond to a text unless there is a clear request. Pt excited to be going out of town for a few days to a retreat center. Pt looking forward to experiencing some mental space to process the separation and to increase self-care/health/wellness.       Treatment plan:  Target symptoms: anxiety , interpersonal issues  Why chosen therapy is appropriate versus another modality: relevant to diagnosis, patient responds to this modality, evidence based practice  Outcome monitoring methods: self-report, observation  Therapeutic intervention type: insight oriented psychotherapy, behavior modifying psychotherapy, supportive psychotherapy    Risk parameters:  Patient reports no suicidal ideation  Patient reports no homicidal ideation  Patient reports no self-injurious behavior  Patient reports no violent behavior    Verbal deficits: None    Patient's response to intervention:  The patient's response to intervention is accepting.    Progress toward goals and other mental status changes:  The  patient's progress toward goals is excellent.    Diagnosis:     ICD-10-CM ICD-9-CM   1. Adjustment disorder, unspecified type  F43.20 309.9       Plan:  individual psychotherapy    Return to clinic: 2 weeks    Length of Service (minutes): 60

## 2022-10-21 ENCOUNTER — TELEPHONE (OUTPATIENT)
Dept: OPTOMETRY | Facility: CLINIC | Age: 57
End: 2022-10-21
Payer: COMMERCIAL

## 2022-10-21 NOTE — TELEPHONE ENCOUNTER
Final  Contact Lens Final Rx       Final Contact Lens Rx         Brand Base Curve Diameter Sphere Cylinder Add    Right AV 1 day MAX Multifocal 8.4 14.3 +1.25 Sphere med    Left AV 1 day MAX Multifocal 8.4 14.3 +1.25 Sphere med      Expiration Date: 10/21/2023    Replacement: Daily    Wearing Schedule: Daily Wear

## 2022-10-25 ENCOUNTER — PATIENT MESSAGE (OUTPATIENT)
Dept: OPTOMETRY | Facility: CLINIC | Age: 57
End: 2022-10-25
Payer: COMMERCIAL

## 2022-11-11 ENCOUNTER — PATIENT MESSAGE (OUTPATIENT)
Dept: RESEARCH | Facility: HOSPITAL | Age: 57
End: 2022-11-11
Payer: COMMERCIAL

## 2022-11-16 ENCOUNTER — OFFICE VISIT (OUTPATIENT)
Dept: DERMATOLOGY | Facility: CLINIC | Age: 57
End: 2022-11-16
Payer: COMMERCIAL

## 2022-11-16 DIAGNOSIS — L73.8 SEBACEOUS HYPERPLASIA: ICD-10-CM

## 2022-11-16 DIAGNOSIS — L92.0 GA (GRANULOMA ANNULARE): Primary | ICD-10-CM

## 2022-11-16 PROCEDURE — 1160F PR REVIEW ALL MEDS BY PRESCRIBER/CLIN PHARMACIST DOCUMENTED: ICD-10-PCS | Mod: CPTII,S$GLB,, | Performed by: DERMATOLOGY

## 2022-11-16 PROCEDURE — 4010F PR ACE/ARB THEARPY RXD/TAKEN: ICD-10-PCS | Mod: CPTII,S$GLB,, | Performed by: DERMATOLOGY

## 2022-11-16 PROCEDURE — 1159F MED LIST DOCD IN RCRD: CPT | Mod: CPTII,S$GLB,, | Performed by: DERMATOLOGY

## 2022-11-16 PROCEDURE — 1160F RVW MEDS BY RX/DR IN RCRD: CPT | Mod: CPTII,S$GLB,, | Performed by: DERMATOLOGY

## 2022-11-16 PROCEDURE — 99213 PR OFFICE/OUTPT VISIT, EST, LEVL III, 20-29 MIN: ICD-10-PCS | Mod: S$GLB,,, | Performed by: DERMATOLOGY

## 2022-11-16 PROCEDURE — 4010F ACE/ARB THERAPY RXD/TAKEN: CPT | Mod: CPTII,S$GLB,, | Performed by: DERMATOLOGY

## 2022-11-16 PROCEDURE — 99999 PR PBB SHADOW E&M-EST. PATIENT-LVL II: CPT | Mod: PBBFAC,,, | Performed by: DERMATOLOGY

## 2022-11-16 PROCEDURE — 99213 OFFICE O/P EST LOW 20 MIN: CPT | Mod: S$GLB,,, | Performed by: DERMATOLOGY

## 2022-11-16 PROCEDURE — 99999 PR PBB SHADOW E&M-EST. PATIENT-LVL II: ICD-10-PCS | Mod: PBBFAC,,, | Performed by: DERMATOLOGY

## 2022-11-16 PROCEDURE — 1159F PR MEDICATION LIST DOCUMENTED IN MEDICAL RECORD: ICD-10-PCS | Mod: CPTII,S$GLB,, | Performed by: DERMATOLOGY

## 2022-11-16 RX ORDER — DESOXIMETASONE 2.5 MG/G
CREAM TOPICAL 2 TIMES DAILY
Qty: 15 G | Refills: 2 | Status: SHIPPED | OUTPATIENT
Start: 2022-11-16

## 2022-11-16 NOTE — PROGRESS NOTES
Subjective:       Patient ID:  Rhoda Daly is a 57 y.o. female who presents for   Chief Complaint   Patient presents with    Spot     Spot  Pt presents today for a spot on the right thumb and right forearm. States that the spots are a little itchy.  Has not treated spots with anything.   Has also noticed a few new bumps on face. Asymptomatic and no prior treatment.  Has hx of asteatotic eczema.    Review of Systems   Constitutional:  Negative for fever, chills and fatigue.   Skin:  Positive for daily sunscreen use and activity-related sunscreen use. Negative for recent sunburn.   Hematologic/Lymphatic: Does not bruise/bleed easily.      Objective:    Physical Exam   Constitutional: She appears well-developed and well-nourished. No distress.   Neurological: She is alert and oriented to person, place, and time. She is not disoriented.   Psychiatric: She has a normal mood and affect.   Skin:   Areas Examined (abnormalities noted in diagram):   Head / Face Inspection Performed  Nails and Digits Inspection Performed                 Diagram Legend     Erythematous scaling macule/papule c/w actinic keratosis       Vascular papule c/w angioma      Pigmented verrucoid papule/plaque c/w seborrheic keratosis      Yellow umbilicated papule c/w sebaceous hyperplasia      Irregularly shaped tan macule c/w lentigo     1-2 mm smooth white papules consistent with Milia      Movable subcutaneous cyst with punctum c/w epidermal inclusion cyst      Subcutaneous movable cyst c/w pilar cyst      Firm pink to brown papule c/w dermatofibroma      Pedunculated fleshy papule(s) c/w skin tag(s)      Evenly pigmented macule c/w junctional nevus     Mildly variegated pigmented, slightly irregular-bordered macule c/w mildly atypical nevus      Flesh colored to evenly pigmented papule c/w intradermal nevus       Pink pearly papule/plaque c/w basal cell carcinoma      Erythematous hyperkeratotic cursted plaque c/w SCC      Surgical scar with  no sign of skin cancer recurrence      Open and closed comedones      Inflammatory papules and pustules      Verrucoid papule consistent consistent with wart     Erythematous eczematous patches and plaques     Dystrophic onycholytic nail with subungual debris c/w onychomycosis     Umbilicated papule    Erythematous-base heme-crusted tan verrucoid plaque consistent with inflamed seborrheic keratosis     Erythematous Silvery Scaling Plaque c/w Psoriasis     See annotation      Assessment / Plan:        GA (granuloma annulare)  -     desoximetasone (TOPICORT) 0.25 % cream; Apply topically 2 (two) times daily. x 1-2 wks then prn flares only  Dispense: 15 g; Refill: 2    Sebaceous hyperplasia  Rec: retinoid cream. Pt prefers to start with a mild one. Rec: Differin gel OTC qhs. Wash off in am and apply sunscreen.    Rtc prn

## 2022-11-16 NOTE — PATIENT INSTRUCTIONS
Sun Protection      The [facility] would like to remind you of the importance of sun protection all year round and particularly during the summer when the suns rays are the strongest. It has been proven that both acute and chronic sun exposure damages our cells and leads to skin cancer. Beyond skin cancer, the sun causes 90% of the symptoms of premature skin aging, including wrinkles, lentigines (brown spots), and thin, easily bruised skin. Proper sun protection can help prevent these unwanted conditions.    Many patients report that they dont go in the sun. It has been shown that the average person receives 18 hours of incidental sun exposure per week during activities such as walking through parking lots, driving, or sitting next to windows. This accumulates to several bad sunburns per year!    In choosing sunscreen, you want one that protects against both UVA and UVB rays (broad spectrum). It is recommended that you use one of SPF 30 or higher. It is important to apply the sunscreen about 20 minutes prior to sun exposure. Most sunscreens are chemical sunscreens and a reaction must take place in the skin so that they are effective. If they are applied and then you are immediately exposed to the sun or start sweating, this reaction has not had time to take place and you are therefore unprotected. Sunscreen needs to be reapplied every 2 hours if you are participating in water sports or sweating. We recommend Elta MD or CeraVe sunscreens for daily use; however there are many options and it is most important for you to find one that you will use on a consistent basis.    If you have sensitive skin, you may do best with a sunscreen that contains only physical blockers in the active ingredient section. The only physical blockers available in the [place] currently are titanium dioxide or zinc oxide. These are typically thicker and harder to apply, however they afford very good protection.  Neutrogena Sensitive Skin, Blue Lizard Sensitive Skin (pink top) or Neutrogena Pure and Free are popular ones.     Aside from sunscreen, clothes with UV protection (UPF), wide brimmed hats, and sunglasses are other means of sun protection that we recommend.      Based on a recent study (6/2021) and out of an abundance of caution, we are recommending that you AVOID the following sunscreens as they may contain the carcinogen, benzene:    Spray and gel sunscreens  Any [employer] or [facility]reens brands as well as Max Block and TopCare brands   Neutrogena Ultra Sheer Dry-touch Water Resistant Sunscreen LOTION SPF 70   Neutrogena Sheer Zinc Dry-touch Face Sunscreen LOTION SPF 50   5.   Aveeno Baby Continuous Protection Sensitive Skin Sunscreen LOTION - Broad Spectrum SPF 50    Please note that Benzene is not an ingredient or the degradation product of any ingredient in any sunscreen. This study suggested that the findings are a result of contamination in the manufacturing process. At this point, we don't know how effectively Benzene gets through the skin, if it gets absorbed systemically, and what effects it may have.     We do know that ultraviolet radiation is a well-established carcinogen. Please use daily sun protection/avoidance and use of at least SPF 30, broad-spectrum sunscreen not listed above.                       [facility] - DERMATOLOGY [address]  [address]  [place] [address]  Dept: [phone]  Dept Fax: [phone]                                                                              RETINOIDS           Your doctor has prescribed a topical retinoid for your skin. A retinoid is a vitamin A derived product used to treat a variety of skin conditions including acne, actinic keratoses (pre-skin cancers), uneven pigmentation from sun damage, fine lines and wrinkles, and enlarged pores.    How do they work?         Retinoids increase skin cell turn over from the normal 30  days to five or six days, minimizing clogged pores-the major factor in acne. Retinoids can also repair the DNA in cells damaged by the sun helping to even out skin pigmentation and clear pre-skin cancers. They can shrink oil glands and minimize the appearance of large pores. These effects can not be appreciated unless the medication is used on a consistent basis!    How do I use a retinoid?         After washing with a mild cleanser (Purpose, Aqua glycolic face cleanser, Cetaphil, Neutrogena deep cream cleanser), the skin should be moisturized with a non-retinol containing moisturizer such as Cerave PM. Then a thin layer of medication is applied to the forehead, nose cheeks, and chin (and around eyes if treating fine lines and wrinkles) at night. The amount of medication needed to cover the entire face should be no more than the size of a green pea. Irritation around the eyes can be treated with Vaseline at night.    What if my skin appears dry, red, and is peeling?          Retinoids do not cause dry skin but rather they cause the top layer of the skin the shed, giving an appearance of dry skin. In fact, new healthy skin cells are replacing older, damaged cells on the surface. This usually occurs the first 2-4 weeks as the skin is adjusting to the medication. It is reasonable to use the medication every other night or even every 2 nights until your skin adjusts. You can use a MILD exfoliant to remove the peeling skin (Aveeno daily clarifying pads, Aqua glycolic face cream) and can apply a moisturizer throughout the day as needed. Retinoids come in a variety of strengths and vehicles and your doctor can find one best for you. If you cannot tolerate prescription strength retinoids, over the counter products with retinol may be beneficial. (Olay ProX wrinkle cream, FEDE deep wrinkle cream, Green Cream at [facility])    Will my skin be more sensitive in the sun?           You will need to use sunscreen with SPF 30  daily. Retinoids will cause the outermost layer of the skin to be thinner and thus more sensitive to ultraviolet rays. However, remember that over time, retinoids actually make the skin thicker by enhancing collagen deposition which protects the skin from sun damage.    When will I see results?            If you are using a retinoid for acne, you should see improvement in 6-8 weeks. Do not be alarmed if you find that your acne gets worse before it gets better-KEEP USING THE MEDICATION- this is a normal response and your acne will improve if you can stick with it.           If you are using the medication for anti-aging and skin dyspigmentation, you may see results in 3 months, but most effects are not visible until 6 months. Retinoids are clinically proven to reverse signs of aging, but only if used on a CONSTISTENT BASIS!             Remember that retinoids should not be used if you are pregnant.           Discontinue use 1 week prior to waxing, as skin is more likely to tear.

## 2022-11-29 ENCOUNTER — OFFICE VISIT (OUTPATIENT)
Dept: PSYCHIATRY | Facility: CLINIC | Age: 57
End: 2022-11-29
Payer: COMMERCIAL

## 2022-11-29 DIAGNOSIS — F43.20 ADJUSTMENT DISORDER, UNSPECIFIED TYPE: Primary | ICD-10-CM

## 2022-11-29 PROCEDURE — 4010F ACE/ARB THERAPY RXD/TAKEN: CPT | Mod: CPTII,S$GLB,, | Performed by: SOCIAL WORKER

## 2022-11-29 PROCEDURE — 4010F PR ACE/ARB THEARPY RXD/TAKEN: ICD-10-PCS | Mod: CPTII,S$GLB,, | Performed by: SOCIAL WORKER

## 2022-11-29 PROCEDURE — 90837 PR PSYCHOTHERAPY W/PATIENT, 60 MIN: ICD-10-PCS | Mod: S$GLB,,, | Performed by: SOCIAL WORKER

## 2022-11-29 PROCEDURE — 90837 PSYTX W PT 60 MINUTES: CPT | Mod: S$GLB,,, | Performed by: SOCIAL WORKER

## 2022-11-29 NOTE — PROGRESS NOTES
Individual Psychotherapy (PhD/LCSW)    11/29/2022     Site:  [facility]         Therapeutic Intervention: Met with patient.  Outpatient - Behavior modifying psychotherapy  60 min - CPT code 40564, Outpatient - Supportive psychotherapy 60 min - CPT Code 37871 and Outpatient - Interactive psychotherapy 60 min - CPT code 23404    Chief complaint/reason for encounter: interpersonal     Interval history and content of current session: Pt is a 57 year-old  white female who presents today for a follow up therapy session. Pt reports that she and  have pressed the pause button on talking about the marriage. Focused on dgtr's involvement in [place] and getting through the holidays. Pt reports that  keeps pushing the envelope despite this conversation. Pt describes continued conversations which sound circular. Pt reports that she is concerned that the children are getting in the middle and she wants to avoid this. Pt does appear more comfortable going out to social events in the city without . Is less worried about how others perceive them being . Pt enjoyed time away in [place]. Looking forward to having her dgtr in town for a month.       Treatment plan:  Target symptoms: anxiety , interpersonal issues  Why chosen therapy is appropriate versus another modality: relevant to diagnosis, patient responds to this modality, evidence based practice  Outcome monitoring methods: self-report, observation  Therapeutic intervention type: insight oriented psychotherapy, behavior modifying psychotherapy, supportive psychotherapy    Risk parameters:  Patient reports no suicidal ideation  Patient reports no homicidal ideation  Patient reports no self-injurious behavior  Patient reports no violent behavior    Verbal deficits: None    Patient's response to intervention:  The patient's response to intervention is accepting.    Progress toward goals and other mental status changes:  The patient's  progress toward goals is excellent.    Diagnosis:     ICD-10-CM ICD-9-CM   1. Adjustment disorder, unspecified type  F43.20 309.9       Plan:  individual psychotherapy    Return to clinic: 2 weeks    Length of Service (minutes): 60

## 2022-12-05 ENCOUNTER — PATIENT MESSAGE (OUTPATIENT)
Dept: DERMATOLOGY | Facility: CLINIC | Age: 57
End: 2022-12-05
Payer: COMMERCIAL

## 2022-12-05 ENCOUNTER — PATIENT MESSAGE (OUTPATIENT)
Dept: INTERNAL MEDICINE | Facility: CLINIC | Age: 57
End: 2022-12-05
Payer: COMMERCIAL

## 2022-12-05 DIAGNOSIS — Z00.00 ROUTINE GENERAL MEDICAL EXAMINATION AT A HEALTH CARE FACILITY: Primary | ICD-10-CM

## 2022-12-05 NOTE — TELEPHONE ENCOUNTER
Hi, please contact the patient to assist in scheduling    Orders Placed This Encounter    CBC Auto Differential    Comprehensive Metabolic Panel    Lipid Panel       Thank you, Manoj Stein

## 2022-12-06 ENCOUNTER — LAB VISIT (OUTPATIENT)
Dept: LAB | Facility: HOSPITAL | Age: 57
End: 2022-12-06
Payer: COMMERCIAL

## 2022-12-06 ENCOUNTER — OFFICE VISIT (OUTPATIENT)
Dept: PSYCHIATRY | Facility: CLINIC | Age: 57
End: 2022-12-06
Payer: COMMERCIAL

## 2022-12-06 DIAGNOSIS — Z00.00 ROUTINE GENERAL MEDICAL EXAMINATION AT A HEALTH CARE FACILITY: ICD-10-CM

## 2022-12-06 DIAGNOSIS — F43.20 ADJUSTMENT DISORDER, UNSPECIFIED TYPE: Primary | ICD-10-CM

## 2022-12-06 LAB
ALBUMIN SERPL BCP-MCNC: 4 G/DL (ref 3.5–5.2)
ALP SERPL-CCNC: 91 U/L (ref 55–135)
ALT SERPL W/O P-5'-P-CCNC: 17 U/L (ref 10–44)
ANION GAP SERPL CALC-SCNC: 7 MMOL/L (ref 8–16)
AST SERPL-CCNC: 23 U/L (ref 10–40)
BASOPHILS # BLD AUTO: 0.04 K/UL (ref 0–0.2)
BASOPHILS NFR BLD: 0.9 % (ref 0–1.9)
BILIRUB SERPL-MCNC: 0.6 MG/DL (ref 0.1–1)
BUN SERPL-MCNC: 18 MG/DL (ref 6–20)
CALCIUM SERPL-MCNC: 9.8 MG/DL (ref 8.7–10.5)
CHLORIDE SERPL-SCNC: 106 MMOL/L (ref 95–110)
CHOLEST SERPL-MCNC: 217 MG/DL (ref 120–199)
CHOLEST/HDLC SERPL: 2.6 {RATIO} (ref 2–5)
CO2 SERPL-SCNC: 28 MMOL/L (ref 23–29)
CREAT SERPL-MCNC: 0.9 MG/DL (ref 0.5–1.4)
DIFFERENTIAL METHOD: NORMAL
EOSINOPHIL # BLD AUTO: 0.1 K/UL (ref 0–0.5)
EOSINOPHIL NFR BLD: 2 % (ref 0–8)
ERYTHROCYTE [DISTWIDTH] IN BLOOD BY AUTOMATED COUNT: 11.9 % (ref 11.5–14.5)
EST. GFR  (NO RACE VARIABLE): >60 ML/MIN/1.73 M^2
GLUCOSE SERPL-MCNC: 94 MG/DL (ref 70–110)
HCT VFR BLD AUTO: 40.6 % (ref 37–48.5)
HDLC SERPL-MCNC: 85 MG/DL (ref 40–75)
HDLC SERPL: 39.2 % (ref 20–50)
HGB BLD-MCNC: 13.4 G/DL (ref 12–16)
IMM GRANULOCYTES # BLD AUTO: 0.01 K/UL (ref 0–0.04)
IMM GRANULOCYTES NFR BLD AUTO: 0.2 % (ref 0–0.5)
LDLC SERPL CALC-MCNC: 120.6 MG/DL (ref 63–159)
LYMPHOCYTES # BLD AUTO: 1.8 K/UL (ref 1–4.8)
LYMPHOCYTES NFR BLD: 40.1 % (ref 18–48)
MCH RBC QN AUTO: 31 PG (ref 27–31)
MCHC RBC AUTO-ENTMCNC: 33 G/DL (ref 32–36)
MCV RBC AUTO: 94 FL (ref 82–98)
MONOCYTES # BLD AUTO: 0.4 K/UL (ref 0.3–1)
MONOCYTES NFR BLD: 8.9 % (ref 4–15)
NEUTROPHILS # BLD AUTO: 2.2 K/UL (ref 1.8–7.7)
NEUTROPHILS NFR BLD: 47.9 % (ref 38–73)
NONHDLC SERPL-MCNC: 132 MG/DL
NRBC BLD-RTO: 0 /100 WBC
PLATELET # BLD AUTO: 264 K/UL (ref 150–450)
PMV BLD AUTO: 11.2 FL (ref 9.2–12.9)
POTASSIUM SERPL-SCNC: 5.3 MMOL/L (ref 3.5–5.1)
PROT SERPL-MCNC: 6.6 G/DL (ref 6–8.4)
RBC # BLD AUTO: 4.32 M/UL (ref 4–5.4)
SODIUM SERPL-SCNC: 141 MMOL/L (ref 136–145)
TRIGL SERPL-MCNC: 57 MG/DL (ref 30–150)
WBC # BLD AUTO: 4.51 K/UL (ref 3.9–12.7)

## 2022-12-06 PROCEDURE — 80053 COMPREHEN METABOLIC PANEL: CPT | Performed by: INTERNAL MEDICINE

## 2022-12-06 PROCEDURE — 4010F ACE/ARB THERAPY RXD/TAKEN: CPT | Mod: CPTII,S$GLB,, | Performed by: SOCIAL WORKER

## 2022-12-06 PROCEDURE — 90837 PR PSYCHOTHERAPY W/PATIENT, 60 MIN: ICD-10-PCS | Mod: S$GLB,,, | Performed by: SOCIAL WORKER

## 2022-12-06 PROCEDURE — 90837 PSYTX W PT 60 MINUTES: CPT | Mod: S$GLB,,, | Performed by: SOCIAL WORKER

## 2022-12-06 PROCEDURE — 85025 COMPLETE CBC W/AUTO DIFF WBC: CPT | Performed by: INTERNAL MEDICINE

## 2022-12-06 PROCEDURE — 80061 LIPID PANEL: CPT | Performed by: INTERNAL MEDICINE

## 2022-12-06 PROCEDURE — 4010F PR ACE/ARB THEARPY RXD/TAKEN: ICD-10-PCS | Mod: CPTII,S$GLB,, | Performed by: SOCIAL WORKER

## 2022-12-06 PROCEDURE — 36415 COLL VENOUS BLD VENIPUNCTURE: CPT | Performed by: INTERNAL MEDICINE

## 2022-12-06 NOTE — PROGRESS NOTES
Individual Psychotherapy (PhD/LCSW)    12/6/2022     Site:  [facility]         Therapeutic Intervention: Met with patient.  Outpatient - Behavior modifying psychotherapy  60 min - CPT code 14449, Outpatient - Supportive psychotherapy 60 min - CPT Code 30326 and Outpatient - Interactive psychotherapy 60 min - CPT code 81259    Chief complaint/reason for encounter: interpersonal     Interval history and content of current session: Pt is a 57 year-old  white female who presents today for a follow up therapy session. Pt reports that she is doing well. She states that  continues to show the same pattern of trying to engage pt at inappropriate times and about their relationship. Pt states that she is having an easier time setting her own boundaries and sticking with them. Endorses feeling more comfortable notifying people about the separation, especially if they ask. Pt finished a project with [facility] and states that this went well. Is open to future projects at [facility] or elsewhere. Pt providing additional emotional support to 26 y/o son who is having issues with his girlfriend and at work.       Treatment plan:  Target symptoms: anxiety , interpersonal issues  Why chosen therapy is appropriate versus another modality: relevant to diagnosis, patient responds to this modality, evidence based practice  Outcome monitoring methods: self-report, observation  Therapeutic intervention type: insight oriented psychotherapy, behavior modifying psychotherapy, supportive psychotherapy    Risk parameters:  Patient reports no suicidal ideation  Patient reports no homicidal ideation  Patient reports no self-injurious behavior  Patient reports no violent behavior    Verbal deficits: None    Patient's response to intervention:  The patient's response to intervention is accepting.    Progress toward goals and other mental status changes:  The patient's progress toward goals is excellent.    Diagnosis:     ICD-10-CM  ICD-9-CM   1. Adjustment disorder, unspecified type  F43.20 309.9       Plan:  individual psychotherapy    Return to clinic: 2 weeks    Length of Service (minutes): 60

## 2022-12-12 ENCOUNTER — OFFICE VISIT (OUTPATIENT)
Dept: INTERNAL MEDICINE | Facility: CLINIC | Age: 57
End: 2022-12-12
Payer: COMMERCIAL

## 2022-12-12 VITALS — BODY MASS INDEX: 19.56 KG/M2 | SYSTOLIC BLOOD PRESSURE: 95 MMHG | DIASTOLIC BLOOD PRESSURE: 70 MMHG | WEIGHT: 114 LBS

## 2022-12-12 DIAGNOSIS — I10 ESSENTIAL HYPERTENSION: ICD-10-CM

## 2022-12-12 DIAGNOSIS — Z00.00 ROUTINE GENERAL MEDICAL EXAMINATION AT A HEALTH CARE FACILITY: Primary | ICD-10-CM

## 2022-12-12 PROCEDURE — 1159F MED LIST DOCD IN RCRD: CPT | Mod: CPTII,S$GLB,, | Performed by: INTERNAL MEDICINE

## 2022-12-12 PROCEDURE — 4010F ACE/ARB THERAPY RXD/TAKEN: CPT | Mod: CPTII,S$GLB,, | Performed by: INTERNAL MEDICINE

## 2022-12-12 PROCEDURE — 99999 PR PBB SHADOW E&M-EST. PATIENT-LVL III: CPT | Mod: PBBFAC,,, | Performed by: INTERNAL MEDICINE

## 2022-12-12 PROCEDURE — 3078F DIAST BP <80 MM HG: CPT | Mod: CPTII,S$GLB,, | Performed by: INTERNAL MEDICINE

## 2022-12-12 PROCEDURE — 3074F SYST BP LT 130 MM HG: CPT | Mod: CPTII,S$GLB,, | Performed by: INTERNAL MEDICINE

## 2022-12-12 PROCEDURE — 99396 PREV VISIT EST AGE 40-64: CPT | Mod: S$GLB,,, | Performed by: INTERNAL MEDICINE

## 2022-12-12 PROCEDURE — 3074F PR MOST RECENT SYSTOLIC BLOOD PRESSURE < 130 MM HG: ICD-10-PCS | Mod: CPTII,S$GLB,, | Performed by: INTERNAL MEDICINE

## 2022-12-12 PROCEDURE — 1160F PR REVIEW ALL MEDS BY PRESCRIBER/CLIN PHARMACIST DOCUMENTED: ICD-10-PCS | Mod: CPTII,S$GLB,, | Performed by: INTERNAL MEDICINE

## 2022-12-12 PROCEDURE — 1160F RVW MEDS BY RX/DR IN RCRD: CPT | Mod: CPTII,S$GLB,, | Performed by: INTERNAL MEDICINE

## 2022-12-12 PROCEDURE — 3008F BODY MASS INDEX DOCD: CPT | Mod: CPTII,S$GLB,, | Performed by: INTERNAL MEDICINE

## 2022-12-12 PROCEDURE — 1159F PR MEDICATION LIST DOCUMENTED IN MEDICAL RECORD: ICD-10-PCS | Mod: CPTII,S$GLB,, | Performed by: INTERNAL MEDICINE

## 2022-12-12 PROCEDURE — 3008F PR BODY MASS INDEX (BMI) DOCUMENTED: ICD-10-PCS | Mod: CPTII,S$GLB,, | Performed by: INTERNAL MEDICINE

## 2022-12-12 PROCEDURE — 3078F PR MOST RECENT DIASTOLIC BLOOD PRESSURE < 80 MM HG: ICD-10-PCS | Mod: CPTII,S$GLB,, | Performed by: INTERNAL MEDICINE

## 2022-12-12 PROCEDURE — 99999 PR PBB SHADOW E&M-EST. PATIENT-LVL III: ICD-10-PCS | Mod: PBBFAC,,, | Performed by: INTERNAL MEDICINE

## 2022-12-12 PROCEDURE — 99396 PR PREVENTIVE VISIT,EST,40-64: ICD-10-PCS | Mod: S$GLB,,, | Performed by: INTERNAL MEDICINE

## 2022-12-12 PROCEDURE — 4010F PR ACE/ARB THEARPY RXD/TAKEN: ICD-10-PCS | Mod: CPTII,S$GLB,, | Performed by: INTERNAL MEDICINE

## 2022-12-12 NOTE — PROGRESS NOTES
Subjective:       Patient ID: Rhoda Daly is a 57 y.o. female.    Chief Complaint: No chief complaint on file.    Here for annual exam    Wonders abt pna vaccine    Wakes up with whole body tingling sensations at times, she has had some mild sleep issues as well -- partially related to separation.    Woke up with red kenneth middle of the face, L side, burned with water from shower.    Review of Systems   Constitutional:  Positive for unexpected weight change (at first from stress, but weight has stabilized). Negative for activity change and appetite change.   Eyes:  Negative for visual disturbance.   Respiratory:  Negative for cough, chest tightness and shortness of breath.    Cardiovascular:  Negative for chest pain.   Gastrointestinal:  Negative for abdominal distention and abdominal pain.   Genitourinary:  Negative for difficulty urinating and urgency.        No breast lumps/masses/pain/nipple d/c in either breast     Skin:  Negative for rash.         Objective:      Physical Exam  Vitals reviewed.   Constitutional:       General: She is not in acute distress.     Appearance: Normal appearance. She is well-developed. She is not ill-appearing, toxic-appearing or diaphoretic.   HENT:      Head: Normocephalic and atraumatic.   Eyes:      General: No scleral icterus.     Pupils: Pupils are equal, round, and reactive to light.   Neck:      Thyroid: No thyromegaly.   Cardiovascular:      Rate and Rhythm: Normal rate and regular rhythm.      Heart sounds: Normal heart sounds. No murmur heard.    No friction rub. No gallop.   Pulmonary:      Effort: Pulmonary effort is normal. No respiratory distress.      Breath sounds: Normal breath sounds. No wheezing or rales.   Abdominal:      General: Bowel sounds are normal. There is no distension.      Palpations: Abdomen is soft. There is no mass.      Tenderness: There is no abdominal tenderness. There is no guarding or rebound.   Musculoskeletal:         General: No  tenderness. Normal range of motion.      Cervical back: Normal range of motion.   Lymphadenopathy:      Cervical: No cervical adenopathy.   Skin:     Comments: Mild area on L side of face, above bridge of the nose, adjacent to eyebrow, no obvious vesicles   Neurological:      General: No focal deficit present.      Mental Status: She is alert and oriented to person, place, and time.   Psychiatric:         Mood and Affect: Mood normal.         Speech: Speech normal.         Behavior: Behavior normal.       Assessment:       1. Routine general medical examination at a health care facility    2. Essential hypertension        Plan:       Diagnoses and all orders for this visit:    Routine general medical examination at a health care facility  Wt loss from stress, she has continued to monitor and states wts have not been stable    Essential hypertension  Controlled  Medications Discontinued During This Encounter   Medication Reason    estradioL (ESTRING) 2 mg (7.5 mcg /24 hour) vaginal ring     valsartan (DIOVAN) 40 MG tablet      She will closely watch pressures off med    Face rash -- she will monitor for possible shingles, she will notify if any changes/worsening    Adjustment -- doing OK, sees psychotherapist    Health Maintenance         Date Due Completion Date    HIV Screening Never done ---    Mammogram 01/24/2023 1/24/2022    Cervical Cancer Screening 01/17/2024 1/17/2019    TETANUS VACCINE 02/08/2026 2/8/2016    Lipid Panel 12/06/2027 12/6/2022    Colorectal Cancer Screening 03/04/2032 3/4/2022            Follow up in about 1 year (around 12/12/2023).    Future Appointments   Date Time Provider Department Center   12/22/2022  8:00 AM SALVATORE Cerda [facility]   12/30/2022  8:00 AM SALVATORE Cerda [facility] [place] [facility]   1/12/2023  9:00 AM SALVATORE Cerda [facility]   1/27/2023  8:00 AM SALVATORE Cerda [facility]   2/8/2023  9:00 AM Kika PENALOZA  [place], [facility]OCENC[facility]W [facility]   2/23/2023  9:00 AM Kika Boyce, INOCENCIAW [facility]   2/28/2023  9:00 AM Kika Boyce, SALVATORE [facility]   3/14/2023  9:00 AM Kika Boyce, SALVATORE [facility]   3/28/2023  9:00 AM Kika Boyce, SALVATORE [facility]

## 2022-12-30 ENCOUNTER — OFFICE VISIT (OUTPATIENT)
Dept: PSYCHIATRY | Facility: CLINIC | Age: 57
End: 2022-12-30
Payer: COMMERCIAL

## 2022-12-30 DIAGNOSIS — F43.20 ADJUSTMENT DISORDER, UNSPECIFIED TYPE: Primary | ICD-10-CM

## 2022-12-30 PROCEDURE — 4010F PR ACE/ARB THEARPY RXD/TAKEN: ICD-10-PCS | Mod: CPTII,S$GLB,, | Performed by: SOCIAL WORKER

## 2022-12-30 PROCEDURE — 90837 PSYTX W PT 60 MINUTES: CPT | Mod: S$GLB,,, | Performed by: SOCIAL WORKER

## 2022-12-30 PROCEDURE — 90837 PR PSYCHOTHERAPY W/PATIENT, 60 MIN: ICD-10-PCS | Mod: S$GLB,,, | Performed by: SOCIAL WORKER

## 2022-12-30 PROCEDURE — 4010F ACE/ARB THERAPY RXD/TAKEN: CPT | Mod: CPTII,S$GLB,, | Performed by: SOCIAL WORKER

## 2022-12-30 NOTE — PROGRESS NOTES
Individual Psychotherapy (PhD/LCSW)    12/30/2022     Site:  [facility]         Therapeutic Intervention: Met with patient.  Outpatient - Behavior modifying psychotherapy  60 min - CPT code 72277, Outpatient - Supportive psychotherapy 60 min - CPT Code 74704 and Outpatient - Interactive psychotherapy 60 min - CPT code 82405    Chief complaint/reason for encounter: interpersonal     Interval history and content of current session: Pt is a 57 year-old  white female who presents today for a follow up therapy session. Pt doing well. States that  continues same patterns, constantly texting at inappropriate times. Pt has been feeling empowered and less reactionary. States that at times it can be difficult, but feels that it is getting easier. Pt reports increased time spent with close friends which pt acknowledges has been good for her. Pt reports looking forward to the New Year's [place] with friends. Daughter still in town which has been nice. Pt concerned about mother's health as she is experiencing issues with BP. Overall, pt feels she is doing much better compared to one year ago.       Treatment plan:  Target symptoms: anxiety , interpersonal issues  Why chosen therapy is appropriate versus another modality: relevant to diagnosis, patient responds to this modality, evidence based practice  Outcome monitoring methods: self-report, observation  Therapeutic intervention type: insight oriented psychotherapy, behavior modifying psychotherapy, supportive psychotherapy    Risk parameters:  Patient reports no suicidal ideation  Patient reports no homicidal ideation  Patient reports no self-injurious behavior  Patient reports no violent behavior    Verbal deficits: None    Patient's response to intervention:  The patient's response to intervention is accepting.    Progress toward goals and other mental status changes:  The patient's progress toward goals is excellent.    Diagnosis:     ICD-10-CM  ICD-9-CM   1. Adjustment disorder, unspecified type  F43.20 309.9       Plan:  individual psychotherapy    Return to clinic: 2 weeks    Length of Service (minutes): 60

## 2023-01-27 ENCOUNTER — PATIENT MESSAGE (OUTPATIENT)
Dept: PSYCHIATRY | Facility: CLINIC | Age: 58
End: 2023-01-27
Payer: COMMERCIAL

## 2023-01-27 ENCOUNTER — OFFICE VISIT (OUTPATIENT)
Dept: PSYCHIATRY | Facility: CLINIC | Age: 58
End: 2023-01-27
Payer: COMMERCIAL

## 2023-01-27 DIAGNOSIS — F43.20 ADJUSTMENT DISORDER, UNSPECIFIED TYPE: Primary | ICD-10-CM

## 2023-01-27 PROCEDURE — 90837 PR PSYCHOTHERAPY W/PATIENT, 60 MIN: ICD-10-PCS | Mod: 95,,, | Performed by: SOCIAL WORKER

## 2023-01-27 PROCEDURE — 90837 PSYTX W PT 60 MINUTES: CPT | Mod: 95,,, | Performed by: SOCIAL WORKER

## 2023-01-27 NOTE — PROGRESS NOTES
Individual Psychotherapy (PhD/LCSW)    1/27/2023     The patient location is: adjustment  The chief complaint leading to consultation is: interpersonal    Visit type: audiovisual    Face to Face time with patient: 50 min  60 minutes of total time spent on the encounter, which includes face to face time and non-face to face time preparing to see the patient (eg, review of tests), Obtaining and/or reviewing separately obtained history, Documenting clinical information in the electronic or other health record, Independently interpreting results (not separately reported) and communicating results to the patient/family/caregiver, or Care coordination (not separately reported).         Each patient to whom he or she provides medical services by telemedicine is:  (1) informed of the relationship between the physician and patient and the respective role of any other health care provider with respect to management of the patient; and (2) notified that he or she may decline to receive medical services by telemedicine and may withdraw from such care at any time.    Notes:       Site:  [facility]         Therapeutic Intervention: Met with patient.  Outpatient - Behavior modifying psychotherapy  60 min - CPT code 33903, Outpatient - Supportive psychotherapy 60 min - CPT Code 14827 and Outpatient - Interactive psychotherapy 60 min - CPT code 49133    Chief complaint/reason for encounter: interpersonal     Interval history and content of current session: Pt is a 57 year-old  white female who presents today for a follow up therapy session. Today's session focused mostly on pt's automatic negative thoughts about perfection as a mother, wife, friend. Pt does endorse that her feelings of self-blame are triggered when she considers her marriage, motherhood etc. Pt recognizes this same pattern in mother who is always wanting to impress doctors with her perfection as a patient, following the rules, being compliant. Pt  reports that  continues to avoid connection. Pt appears to be doing a good job of managing his reactions with responses versus emotions. Dgtr's ball is coming up for [place]. Pt busy with this. Enjoying time with friends. Wants to get into a routine where she sees her core group of friends monthly for dinner. Worried about son's choice to see a psychiatrist for medication to manage anxiety. Provided some psychoed on medications used for anxiety, depression, OCD. Overall, pt appears to be doing well.       Treatment plan:  Target symptoms: anxiety , interpersonal issues  Why chosen therapy is appropriate versus another modality: relevant to diagnosis, patient responds to this modality, evidence based practice  Outcome monitoring methods: self-report, observation  Therapeutic intervention type: insight oriented psychotherapy, behavior modifying psychotherapy, supportive psychotherapy    Risk parameters:  Patient reports no suicidal ideation  Patient reports no homicidal ideation  Patient reports no self-injurious behavior  Patient reports no violent behavior    Verbal deficits: None    Patient's response to intervention:  The patient's response to intervention is accepting.    Progress toward goals and other mental status changes:  The patient's progress toward goals is excellent.    Diagnosis:     ICD-10-CM ICD-9-CM   1. Adjustment disorder, unspecified type  F43.20 309.9       Plan:  individual psychotherapy    Return to clinic: 2 weeks    Length of Service (minutes): 60

## 2023-01-30 ENCOUNTER — HOSPITAL ENCOUNTER (OUTPATIENT)
Dept: RADIOLOGY | Facility: HOSPITAL | Age: 58
Discharge: HOME | End: 2023-01-30
Attending: STUDENT IN AN ORGANIZED HEALTH CARE EDUCATION/TRAINING PROGRAM
Payer: COMMERCIAL

## 2023-01-30 DIAGNOSIS — Z01.419 WELL WOMAN EXAM WITH ROUTINE GYNECOLOGICAL EXAM: ICD-10-CM

## 2023-01-30 DIAGNOSIS — Z12.31 BREAST CANCER SCREENING BY MAMMOGRAM: ICD-10-CM

## 2023-01-30 PROCEDURE — 77067 SCR MAMMO BI INCL CAD: CPT | Mod: 26,,, | Performed by: RADIOLOGY

## 2023-01-30 PROCEDURE — 77067 MAMMO DIGITAL SCREENING BILAT WITH TOMO: ICD-10-PCS | Mod: 26,,, | Performed by: RADIOLOGY

## 2023-01-30 PROCEDURE — 77063 MAMMO DIGITAL SCREENING BILAT WITH TOMO: ICD-10-PCS | Mod: 26,,, | Performed by: RADIOLOGY

## 2023-01-30 PROCEDURE — 77067 SCR MAMMO BI INCL CAD: CPT | Mod: TC

## 2023-01-30 PROCEDURE — 77063 BREAST TOMOSYNTHESIS BI: CPT | Mod: 26,,, | Performed by: RADIOLOGY

## 2023-02-08 ENCOUNTER — OFFICE VISIT (OUTPATIENT)
Dept: PSYCHIATRY | Facility: CLINIC | Age: 58
End: 2023-02-08
Payer: COMMERCIAL

## 2023-02-08 DIAGNOSIS — F43.20 ADJUSTMENT DISORDER, UNSPECIFIED TYPE: Primary | ICD-10-CM

## 2023-02-08 PROCEDURE — 90837 PR PSYCHOTHERAPY W/PATIENT, 60 MIN: ICD-10-PCS | Mod: S$GLB,,, | Performed by: SOCIAL WORKER

## 2023-02-08 PROCEDURE — 90837 PSYTX W PT 60 MINUTES: CPT | Mod: S$GLB,,, | Performed by: SOCIAL WORKER

## 2023-02-09 NOTE — PROGRESS NOTES
Individual Psychotherapy (PhD/LCSW)    2/8/2023     The patient location is: adjustment  The chief complaint leading to consultation is: interpersonal    Visit type: audiovisual    Face to Face time with patient: 50 min  60 minutes of total time spent on the encounter, which includes face to face time and non-face to face time preparing to see the patient (eg, review of tests), Obtaining and/or reviewing separately obtained history, Documenting clinical information in the electronic or other health record, Independently interpreting results (not separately reported) and communicating results to the patient/family/caregiver, or Care coordination (not separately reported).         Each patient to whom he or she provides medical services by telemedicine is:  (1) informed of the relationship between the physician and patient and the respective role of any other health care provider with respect to management of the patient; and (2) notified that he or she may decline to receive medical services by telemedicine and may withdraw from such care at any time.    Notes:       Site:  [facility]         Therapeutic Intervention: Met with patient.  Outpatient - Behavior modifying psychotherapy  60 min - CPT code 20590, Outpatient - Supportive psychotherapy 60 min - CPT Code 34993 and Outpatient - Interactive psychotherapy 60 min - CPT code 18987    Chief complaint/reason for encounter: interpersonal     Interval history and content of current session: Pt is a 57 year-old  white female who presents today for a follow up therapy session. Pt spent most of today processing upcoming events for [place] as dgtr will be [place]. Pt reports continued difficult in being on the same page as  when it comes to priorities for the event. Pt states that she feels dgtr will be let down regarding some of the planning and events and has been making an effort to do whatever she can to accommodate and validate dgtr. Clinician  made sure to point out all of the ways pt is doing a great job with this despite some of the let downs pt does not have control over. Pt reports that she will have her mother, sister and core group of friends at the main events and plans on trying to have a good time with them.  continues to text inappropriately about their relationship. Pt continues to set good boundaries around this.       Treatment plan:  Target symptoms: anxiety , interpersonal issues  Why chosen therapy is appropriate versus another modality: relevant to diagnosis, patient responds to this modality, evidence based practice  Outcome monitoring methods: self-report, observation  Therapeutic intervention type: insight oriented psychotherapy, behavior modifying psychotherapy, supportive psychotherapy    Risk parameters:  Patient reports no suicidal ideation  Patient reports no homicidal ideation  Patient reports no self-injurious behavior  Patient reports no violent behavior    Verbal deficits: None    Patient's response to intervention:  The patient's response to intervention is accepting.    Progress toward goals and other mental status changes:  The patient's progress toward goals is excellent.    Diagnosis:     ICD-10-CM ICD-9-CM   1. Adjustment disorder, unspecified type  F43.20 309.9       Plan:  individual psychotherapy    Return to clinic: 2 weeks    Length of Service (minutes): 60

## 2023-02-23 ENCOUNTER — OFFICE VISIT (OUTPATIENT)
Dept: PSYCHIATRY | Facility: CLINIC | Age: 58
End: 2023-02-23
Payer: COMMERCIAL

## 2023-02-23 DIAGNOSIS — F43.20 ADJUSTMENT DISORDER, UNSPECIFIED TYPE: Primary | ICD-10-CM

## 2023-02-23 PROCEDURE — 90837 PR PSYCHOTHERAPY W/PATIENT, 60 MIN: ICD-10-PCS | Mod: S$GLB,,, | Performed by: SOCIAL WORKER

## 2023-02-23 PROCEDURE — 90837 PSYTX W PT 60 MINUTES: CPT | Mod: S$GLB,,, | Performed by: SOCIAL WORKER

## 2023-02-23 NOTE — PROGRESS NOTES
"  Individual Psychotherapy (PhD/LCSW)    2/23/2023       Site:  [facility]         Therapeutic Intervention: Met with patient.  Outpatient - Behavior modifying psychotherapy  60 min - CPT code 32349, Outpatient - Supportive psychotherapy 60 min - CPT Code 77132 and Outpatient - Interactive psychotherapy 60 min - CPT code 92278    Chief complaint/reason for encounter: interpersonal     Interval history and content of current session: Pt is a 57 year-old  white female who presents today for a follow up therapy session. Pt reports that the [place] events went well. She reports that dgtr was stressed with managing some of her "friends" however, she had a good time overall. Pt states that she felt things went smoothly. She and  have decided to create a list of what they want/need if the relationship is to succeed. Pt states that she has worked on her list, however does not believe  has started. Pt recognizes that the end result will likely be the same as past discussions. Doing well overall. Spending time with friends, family and feeling fulfilled.       Treatment plan:  Target symptoms: anxiety , interpersonal issues  Why chosen therapy is appropriate versus another modality: relevant to diagnosis, patient responds to this modality, evidence based practice  Outcome monitoring methods: self-report, observation  Therapeutic intervention type: insight oriented psychotherapy, behavior modifying psychotherapy, supportive psychotherapy    Risk parameters:  Patient reports no suicidal ideation  Patient reports no homicidal ideation  Patient reports no self-injurious behavior  Patient reports no violent behavior    Verbal deficits: None    Patient's response to intervention:  The patient's response to intervention is accepting.    Progress toward goals and other mental status changes:  The patient's progress toward goals is excellent.    Diagnosis:     ICD-10-CM ICD-9-CM   1. Adjustment disorder, " unspecified type  F43.20 309.9       Plan:  individual psychotherapy    Return to clinic: 2 weeks    Length of Service (minutes): 60

## 2023-02-28 ENCOUNTER — OFFICE VISIT (OUTPATIENT)
Dept: PSYCHIATRY | Facility: CLINIC | Age: 58
End: 2023-02-28
Payer: COMMERCIAL

## 2023-02-28 DIAGNOSIS — F43.20 ADJUSTMENT DISORDER, UNSPECIFIED TYPE: Primary | ICD-10-CM

## 2023-02-28 PROCEDURE — 90837 PSYTX W PT 60 MINUTES: CPT | Mod: S$GLB,,, | Performed by: SOCIAL WORKER

## 2023-02-28 PROCEDURE — 90837 PR PSYCHOTHERAPY W/PATIENT, 60 MIN: ICD-10-PCS | Mod: S$GLB,,, | Performed by: SOCIAL WORKER

## 2023-02-28 NOTE — PROGRESS NOTES
Individual Psychotherapy (PhD/LCSW)    2/28/2023       Site:  [facility]         Therapeutic Intervention: Met with patient.  Outpatient - Behavior modifying psychotherapy  60 min - CPT code 68517, Outpatient - Supportive psychotherapy 60 min - CPT Code 59408 and Outpatient - Interactive psychotherapy 60 min - CPT code 65387    Chief complaint/reason for encounter: interpersonal     Interval history and content of current session: Pt is a 57 year-old  white female who presents today for a follow up therapy session. Pt reports continued issues with any movement around communication with . Pt reports that she wrote out her deal-breakers and boundaries that  would need to respect if they were to continue with the marriage. She reports that  did not present any information other than a few vague comments about what he wanted. Pt reports that she recognizes that divorce is becoming more likely, but endorses some of the pros and cons. Clinician provided active listening, validation and support around pt's process. Pt reports that she is somewhat stressed about [facility] [employer]'s upcoming graduation in [place] as elderly mother may struggle to attend all of the events. Will have an honest conversation with her this week regarding what she wants to do (come and participate some, leave early or not come at all). Pt teary eyed when thinking about the hard conversations she will need to have with both her mother and  in the upcoming weeks.      Treatment plan:  Target symptoms: anxiety , interpersonal issues  Why chosen therapy is appropriate versus another modality: relevant to diagnosis, patient responds to this modality, evidence based practice  Outcome monitoring methods: self-report, observation  Therapeutic intervention type: insight oriented psychotherapy, behavior modifying psychotherapy, supportive psychotherapy    Risk parameters:  Patient reports no suicidal ideation  Patient  reports no homicidal ideation  Patient reports no self-injurious behavior  Patient reports no violent behavior    Verbal deficits: None    Patient's response to intervention:  The patient's response to intervention is accepting.    Progress toward goals and other mental status changes:  The patient's progress toward goals is excellent.    Diagnosis:     ICD-10-CM ICD-9-CM   1. Adjustment disorder, unspecified type  F43.20 309.9       Plan:  individual psychotherapy    Return to clinic: 2 weeks    Length of Service (minutes): 60

## 2023-03-14 ENCOUNTER — OFFICE VISIT (OUTPATIENT)
Dept: PSYCHIATRY | Facility: CLINIC | Age: 58
End: 2023-03-14
Payer: COMMERCIAL

## 2023-03-14 DIAGNOSIS — F43.20 ADJUSTMENT DISORDER, UNSPECIFIED TYPE: Primary | ICD-10-CM

## 2023-03-14 PROCEDURE — 90837 PR PSYCHOTHERAPY W/PATIENT, 60 MIN: ICD-10-PCS | Mod: S$GLB,,, | Performed by: SOCIAL WORKER

## 2023-03-14 PROCEDURE — 90837 PSYTX W PT 60 MINUTES: CPT | Mod: S$GLB,,, | Performed by: SOCIAL WORKER

## 2023-03-14 NOTE — PROGRESS NOTES
Individual Psychotherapy (PhD/LCSW)    3/14/2023       Site:  [facility]         Therapeutic Intervention: Met with patient.  Outpatient - Behavior modifying psychotherapy  60 min - CPT code 58923, Outpatient - Supportive psychotherapy 60 min - CPT Code 01959 and Outpatient - Interactive psychotherapy 60 min - CPT code 29035    Chief complaint/reason for encounter: interpersonal     Interval history and content of current session: Pt is a 57 year-old  white female who presents today for a follow up therapy session. Pt reports having important conversation with  about moving forward with the divorce. Will have another meeting with  to discuss next steps. Pt reports that she is feeling some relief now that she has finalized her decision. Mother told pt she is not comfortable attending dgtr's graduation. This has also been a relief for pt as she will not have to worry about her mother during the trip. They will instead do a quick trip to [place] with pt's dgtr to visit the area where pt's grandparents lived and where mother was raised for some of her life. Pt continues to do well and adjust to some of the major events that are occurring.       Treatment plan:  Target symptoms: anxiety , interpersonal issues  Why chosen therapy is appropriate versus another modality: relevant to diagnosis, patient responds to this modality, evidence based practice  Outcome monitoring methods: self-report, observation  Therapeutic intervention type: insight oriented psychotherapy, behavior modifying psychotherapy, supportive psychotherapy    Risk parameters:  Patient reports no suicidal ideation  Patient reports no homicidal ideation  Patient reports no self-injurious behavior  Patient reports no violent behavior    Verbal deficits: None    Patient's response to intervention:  The patient's response to intervention is accepting.    Progress toward goals and other mental status changes:  The patient's  progress toward goals is excellent.    Diagnosis:     ICD-10-CM ICD-9-CM   1. Adjustment disorder, unspecified type  F43.20 309.9       Plan:  individual psychotherapy    Return to clinic: 2 weeks    Length of Service (minutes): 60

## 2023-03-28 ENCOUNTER — OFFICE VISIT (OUTPATIENT)
Dept: PSYCHIATRY | Facility: CLINIC | Age: 58
End: 2023-03-28
Payer: COMMERCIAL

## 2023-03-28 ENCOUNTER — PATIENT MESSAGE (OUTPATIENT)
Dept: PSYCHIATRY | Facility: CLINIC | Age: 58
End: 2023-03-28
Payer: COMMERCIAL

## 2023-03-28 DIAGNOSIS — F43.20 ADJUSTMENT DISORDER, UNSPECIFIED TYPE: Primary | ICD-10-CM

## 2023-03-28 PROCEDURE — 90834 PSYTX W PT 45 MINUTES: CPT | Mod: 95,,, | Performed by: SOCIAL WORKER

## 2023-03-28 PROCEDURE — 90834 PR PSYCHOTHERAPY W/PATIENT, 45 MIN: ICD-10-PCS | Mod: 95,,, | Performed by: SOCIAL WORKER

## 2023-03-28 NOTE — PROGRESS NOTES
"  Individual Psychotherapy (PhD/LCSW)    3/28/2023       Site:  [facility]         Therapeutic Intervention: Met with patient.   Outpatient - Insight oriented psychotherapy 45 min - CPT code 07956, Outpatient - Behavior modifying psychotherapy 45 min - CPT code 31265, Outpatient - Supportive psychotherapy 45 min - CPT Code 07530, and Outpatient - Interactive psychotherapy 45 min - CPT code 75630     Chief complaint/reason for encounter: interpersonal     Interval history and content of current session: Pt is a 57 year-old  white female who presents today for a follow up therapy session. Spent today processing feelings around separation/divorce. Pt acknowledges that she mostly focuses on the "thinking part" and less on the "feeling part." Pt reports a lot of loneliness. Discussed how pt felt lonely in marriage and the evolution of loneliness now that she and  are . Has not gotten push back from  about the divorce since seeing him with the woman he has been spending time with. Pt reports that dgtr accepted a good job and will either work in [place] or [place]. Pt feeling good about this. Has not heard much from her son which pt states is a good sign that he is likely doing okay. Pt still struggles with structure/routine. May decide to apply for a job that is remote and has all of the things in the job description that are attractive. Pt still working on resume. Overall, pt doing okay. Going to the gym regularly and meeting up with close friends.       Treatment plan:  Target symptoms: anxiety , interpersonal issues  Why chosen therapy is appropriate versus another modality: relevant to diagnosis, patient responds to this modality, evidence based practice  Outcome monitoring methods: self-report, observation  Therapeutic intervention type: insight oriented psychotherapy, behavior modifying psychotherapy, supportive psychotherapy    Risk parameters:  Patient reports no suicidal " ideation  Patient reports no homicidal ideation  Patient reports no self-injurious behavior  Patient reports no violent behavior    Verbal deficits: None    Patient's response to intervention:  The patient's response to intervention is accepting.    Progress toward goals and other mental status changes:  The patient's progress toward goals is excellent.    Diagnosis:     ICD-10-CM ICD-9-CM   1. Adjustment disorder, unspecified type  F43.20 309.9       Plan:  individual psychotherapy    Return to clinic: 2 weeks    Length of Service (minutes): 45

## 2023-04-05 ENCOUNTER — OFFICE VISIT (OUTPATIENT)
Dept: PSYCHIATRY | Facility: CLINIC | Age: 58
End: 2023-04-05
Payer: COMMERCIAL

## 2023-04-05 DIAGNOSIS — F43.20 ADJUSTMENT DISORDER, UNSPECIFIED TYPE: Primary | ICD-10-CM

## 2023-04-05 PROCEDURE — 90837 PSYTX W PT 60 MINUTES: CPT | Mod: S$GLB,,, | Performed by: SOCIAL WORKER

## 2023-04-05 PROCEDURE — 90837 PR PSYCHOTHERAPY W/PATIENT, 60 MIN: ICD-10-PCS | Mod: S$GLB,,, | Performed by: SOCIAL WORKER

## 2023-04-05 NOTE — PROGRESS NOTES
Individual Psychotherapy (PhD/LCSW)    4/5/2023       Site:  [facility]         Therapeutic Intervention: Met with patient.    Outpatient - Insight oriented psychotherapy 60 min - CPT code 54655, Outpatient - Behavior modifying psychotherapy 60 min - CPT code 97509, Outpatient - Supportive psychotherapy 60 min - CPT Code 31350, and Outpatient - Interactive psychotherapy 60 min - CPT code 47621     Chief complaint/reason for encounter: interpersonal     Interval history and content of current session: Pt is a 57 year-old  white female who presents today for a follow up therapy session. Pt spent majority of session processing dgtr's living situation post-college. Pt acknowledges that she can't make the choice for dgtr, and that she can offer choices (Do you want me to listen or do you want advice or both). Pt managing separation well. Still struggles with natural emotions of anger and sadness. Processing these emotions appropriately.       Treatment plan:  Target symptoms: anxiety , interpersonal issues  Why chosen therapy is appropriate versus another modality: relevant to diagnosis, patient responds to this modality, evidence based practice  Outcome monitoring methods: self-report, observation  Therapeutic intervention type: insight oriented psychotherapy, behavior modifying psychotherapy, supportive psychotherapy    Risk parameters:  Patient reports no suicidal ideation  Patient reports no homicidal ideation  Patient reports no self-injurious behavior  Patient reports no violent behavior    Verbal deficits: None    Patient's response to intervention:  The patient's response to intervention is accepting.    Progress toward goals and other mental status changes:  The patient's progress toward goals is excellent.    Diagnosis:     ICD-10-CM ICD-9-CM   1. Adjustment disorder, unspecified type  F43.20 309.9       Plan:  individual psychotherapy    Return to clinic: 2 weeks    Length of Service (minutes):  60

## 2023-04-14 ENCOUNTER — PATIENT MESSAGE (OUTPATIENT)
Dept: INTERNAL MEDICINE | Facility: CLINIC | Age: 58
End: 2023-04-14
Payer: COMMERCIAL

## 2023-04-14 DIAGNOSIS — H54.7 VISUAL IMPAIRMENT: Primary | ICD-10-CM

## 2023-04-14 NOTE — TELEPHONE ENCOUNTER
Hi, please contact the patient to assist in scheduling    Orders Placed This Encounter    Ambulatory referral/consult to Optometry       Thank you, Manoj Stein

## 2023-04-17 ENCOUNTER — PATIENT MESSAGE (OUTPATIENT)
Dept: INTERNAL MEDICINE | Facility: CLINIC | Age: 58
End: 2023-04-17
Payer: COMMERCIAL

## 2023-04-17 RX ORDER — PANTOPRAZOLE SODIUM 20 MG/1
20 TABLET, DELAYED RELEASE ORAL DAILY
COMMUNITY
End: 2023-04-17 | Stop reason: SDUPTHER

## 2023-04-17 RX ORDER — PANTOPRAZOLE SODIUM 20 MG/1
20 TABLET, DELAYED RELEASE ORAL DAILY
Qty: 90 TABLET | Refills: 2 | Status: SHIPPED | OUTPATIENT
Start: 2023-04-17

## 2023-04-17 NOTE — TELEPHONE ENCOUNTER
No new care gaps identified.  [facility] Embedded Care Gaps. Reference number: [id number]. 4/17/2023   1:36:34 PM CDT

## 2023-05-03 ENCOUNTER — OFFICE VISIT (OUTPATIENT)
Dept: PSYCHIATRY | Facility: CLINIC | Age: 58
End: 2023-05-03
Payer: COMMERCIAL

## 2023-05-03 DIAGNOSIS — F43.20 ADJUSTMENT DISORDER, UNSPECIFIED TYPE: Primary | ICD-10-CM

## 2023-05-03 PROCEDURE — 90837 PSYTX W PT 60 MINUTES: CPT | Mod: S$GLB,,, | Performed by: SOCIAL WORKER

## 2023-05-03 PROCEDURE — 90837 PR PSYCHOTHERAPY W/PATIENT, 60 MIN: ICD-10-PCS | Mod: S$GLB,,, | Performed by: SOCIAL WORKER

## 2023-05-03 NOTE — PROGRESS NOTES
Individual Psychotherapy (PhD/LCSW)    5/3/2023       Site:  [facility]         Therapeutic Intervention: Met with patient.    Outpatient - Insight oriented psychotherapy 60 min - CPT code 80359, Outpatient - Behavior modifying psychotherapy 60 min - CPT code 13022, Outpatient - Supportive psychotherapy 60 min - CPT Code 68483, and Outpatient - Interactive psychotherapy 60 min - CPT code 00017     Chief complaint/reason for encounter: interpersonal     Interval history and content of current session: Pt is a 57 year-old  white female who presents today for a follow up therapy session. Pt spent majority of session processing feelings of loneliness; wanting to be connected to a partner. Clinician provided normalization and validation of this feeling, especially given the adjustment pt is going through with divorce. Pt reports that she is still active in day to day activities, went to [employer], spending time with family and friends. Still hopeful that she and  can have a good co-parenting relationship in the future.       Treatment plan:  Target symptoms: anxiety , interpersonal issues  Why chosen therapy is appropriate versus another modality: relevant to diagnosis, patient responds to this modality, evidence based practice  Outcome monitoring methods: self-report, observation  Therapeutic intervention type: insight oriented psychotherapy, behavior modifying psychotherapy, supportive psychotherapy    Risk parameters:  Patient reports no suicidal ideation  Patient reports no homicidal ideation  Patient reports no self-injurious behavior  Patient reports no violent behavior    Verbal deficits: None    Patient's response to intervention:  The patient's response to intervention is accepting.    Progress toward goals and other mental status changes:  The patient's progress toward goals is excellent.    Diagnosis:     ICD-10-CM ICD-9-CM   1. Adjustment disorder, unspecified type  F43.20 309.9        Plan:  individual psychotherapy    Return to clinic: 2 weeks    Length of Service (minutes): 60

## 2023-05-09 ENCOUNTER — OFFICE VISIT (OUTPATIENT)
Dept: PSYCHIATRY | Facility: CLINIC | Age: 58
End: 2023-05-09
Payer: COMMERCIAL

## 2023-05-09 DIAGNOSIS — F43.20 ADJUSTMENT DISORDER, UNSPECIFIED TYPE: Primary | ICD-10-CM

## 2023-05-09 PROCEDURE — 90837 PR PSYCHOTHERAPY W/PATIENT, 60 MIN: ICD-10-PCS | Mod: S$GLB,,, | Performed by: SOCIAL WORKER

## 2023-05-09 PROCEDURE — 90837 PSYTX W PT 60 MINUTES: CPT | Mod: S$GLB,,, | Performed by: SOCIAL WORKER

## 2023-05-09 NOTE — PROGRESS NOTES
Individual Psychotherapy (PhD/LCSW)    5/9/2023       Site:  [facility]         Therapeutic Intervention: Met with patient.    Outpatient - Insight oriented psychotherapy 60 min - CPT code 77367, Outpatient - Behavior modifying psychotherapy 60 min - CPT code 68042, Outpatient - Supportive psychotherapy 60 min - CPT Code 81555, and Outpatient - Interactive psychotherapy 60 min - CPT code 45388     Chief complaint/reason for encounter: interpersonal     Interval history and content of current session: Pt is a 57 year-old  white female who presents today for a follow up therapy session. Pt expressing sadness and worry about the future. Has a meeting with  and their  to discuss financial agreement if they go through the divorce. Pt continues to express disappointment in his lack of effort in making changes to allow the marriage to continue. Reports that the loneliness persists although does have plans on some days. Clinician mostly providing active listening and space for pt to process sadness, grief. Pt reports that she and  are going to [place] on Friday to celebrate [employer]'s graduation from [employer]. Looking forward to spending time with [employer] on the drive back.       Treatment plan:  Target symptoms: anxiety , interpersonal issues  Why chosen therapy is appropriate versus another modality: relevant to diagnosis, patient responds to this modality, evidence based practice  Outcome monitoring methods: self-report, observation  Therapeutic intervention type: insight oriented psychotherapy, behavior modifying psychotherapy, supportive psychotherapy    Risk parameters:  Patient reports no suicidal ideation  Patient reports no homicidal ideation  Patient reports no self-injurious behavior  Patient reports no violent behavior    Verbal deficits: None    Patient's response to intervention:  The patient's response to intervention is accepting.    Progress toward goals and other mental status  changes:  The patient's progress toward goals is excellent.    Diagnosis:     ICD-10-CM ICD-9-CM   1. Adjustment disorder, unspecified type  F43.20 309.9       Plan:  individual psychotherapy    Return to clinic: 2 weeks    Length of Service (minutes): 60

## 2023-05-23 ENCOUNTER — PATIENT MESSAGE (OUTPATIENT)
Dept: PSYCHIATRY | Facility: CLINIC | Age: 58
End: 2023-05-23
Payer: COMMERCIAL

## 2023-05-30 ENCOUNTER — OFFICE VISIT (OUTPATIENT)
Dept: OBSTETRICS AND GYNECOLOGY | Facility: CLINIC | Age: 58
End: 2023-05-30
Payer: COMMERCIAL

## 2023-05-30 VITALS
HEIGHT: 64 IN | SYSTOLIC BLOOD PRESSURE: 106 MMHG | BODY MASS INDEX: 19.72 KG/M2 | DIASTOLIC BLOOD PRESSURE: 60 MMHG | WEIGHT: 115.5 LBS

## 2023-05-30 DIAGNOSIS — Z01.419 WELL WOMAN EXAM WITH ROUTINE GYNECOLOGICAL EXAM: Primary | ICD-10-CM

## 2023-05-30 DIAGNOSIS — Z12.31 BREAST CANCER SCREENING BY MAMMOGRAM: ICD-10-CM

## 2023-05-30 PROCEDURE — 3078F PR MOST RECENT DIASTOLIC BLOOD PRESSURE < 80 MM HG: ICD-10-PCS | Mod: CPTII,S$GLB,, | Performed by: STUDENT IN AN ORGANIZED HEALTH CARE EDUCATION/TRAINING PROGRAM

## 2023-05-30 PROCEDURE — 99396 PREV VISIT EST AGE 40-64: CPT | Mod: S$GLB,,, | Performed by: STUDENT IN AN ORGANIZED HEALTH CARE EDUCATION/TRAINING PROGRAM

## 2023-05-30 PROCEDURE — 1159F PR MEDICATION LIST DOCUMENTED IN MEDICAL RECORD: ICD-10-PCS | Mod: CPTII,S$GLB,, | Performed by: STUDENT IN AN ORGANIZED HEALTH CARE EDUCATION/TRAINING PROGRAM

## 2023-05-30 PROCEDURE — 3074F PR MOST RECENT SYSTOLIC BLOOD PRESSURE < 130 MM HG: ICD-10-PCS | Mod: CPTII,S$GLB,, | Performed by: STUDENT IN AN ORGANIZED HEALTH CARE EDUCATION/TRAINING PROGRAM

## 2023-05-30 PROCEDURE — 99396 PR PREVENTIVE VISIT,EST,40-64: ICD-10-PCS | Mod: S$GLB,,, | Performed by: STUDENT IN AN ORGANIZED HEALTH CARE EDUCATION/TRAINING PROGRAM

## 2023-05-30 PROCEDURE — 99999 PR PBB SHADOW E&M-EST. PATIENT-LVL III: CPT | Mod: PBBFAC,,, | Performed by: STUDENT IN AN ORGANIZED HEALTH CARE EDUCATION/TRAINING PROGRAM

## 2023-05-30 PROCEDURE — 87624 HPV HI-RISK TYP POOLED RSLT: CPT | Performed by: STUDENT IN AN ORGANIZED HEALTH CARE EDUCATION/TRAINING PROGRAM

## 2023-05-30 PROCEDURE — 3008F PR BODY MASS INDEX (BMI) DOCUMENTED: ICD-10-PCS | Mod: CPTII,S$GLB,, | Performed by: STUDENT IN AN ORGANIZED HEALTH CARE EDUCATION/TRAINING PROGRAM

## 2023-05-30 PROCEDURE — 1159F MED LIST DOCD IN RCRD: CPT | Mod: CPTII,S$GLB,, | Performed by: STUDENT IN AN ORGANIZED HEALTH CARE EDUCATION/TRAINING PROGRAM

## 2023-05-30 PROCEDURE — 99999 PR PBB SHADOW E&M-EST. PATIENT-LVL III: ICD-10-PCS | Mod: PBBFAC,,, | Performed by: STUDENT IN AN ORGANIZED HEALTH CARE EDUCATION/TRAINING PROGRAM

## 2023-05-30 PROCEDURE — 3074F SYST BP LT 130 MM HG: CPT | Mod: CPTII,S$GLB,, | Performed by: STUDENT IN AN ORGANIZED HEALTH CARE EDUCATION/TRAINING PROGRAM

## 2023-05-30 PROCEDURE — 3078F DIAST BP <80 MM HG: CPT | Mod: CPTII,S$GLB,, | Performed by: STUDENT IN AN ORGANIZED HEALTH CARE EDUCATION/TRAINING PROGRAM

## 2023-05-30 PROCEDURE — 3008F BODY MASS INDEX DOCD: CPT | Mod: CPTII,S$GLB,, | Performed by: STUDENT IN AN ORGANIZED HEALTH CARE EDUCATION/TRAINING PROGRAM

## 2023-05-30 PROCEDURE — 88175 CYTOPATH C/V AUTO FLUID REDO: CPT | Performed by: STUDENT IN AN ORGANIZED HEALTH CARE EDUCATION/TRAINING PROGRAM

## 2023-05-30 NOTE — PROGRESS NOTES
History & Physical  Gynecology      SUBJECTIVE:     Chief Complaint: Annual Exam       History of Present Illness:    Menstrual History: menopausal since age early 50s. No HRT. No PMB. Estradiol topical sent last year, hasn't used it. No concerns as she is not sexually active at this time  Obstetric Hx: 2; 1SVD 1 CS  Sexually Active: one male partner. Not recently  Family history: Denies any personal or family history of GYN/colon cancers   Social: Wears seatbelts. Exercises. Feels safe at home.   Last pap:  normal, HPV neg.  Last mammo:2023  Colonoscopy: due   covid vaccine yes  Labs utd  Vitamins: no        Review of patient's allergies indicates:  No Known Allergies    Past Medical History:   Diagnosis Date    Colon polyp 2015    Diverticulitis     Hypertension      Past Surgical History:   Procedure Laterality Date     SECTION      COLONOSCOPY N/A 3/4/2022    Procedure: COLONOSCOPY;  Surgeon: Elvin Parker MD;  Location: [facility] ([address] [place] [address]);  Service: Endoscopy;  Laterality: N/A;  Pt. is Fully Vaccinated.EC   rescheduled; covid test  @ [facility]; instructions emailed-st  2nd floor due to availability   pt rescheduled multiple times; covid + 22 < 60 days; instructions emailed-st  3/2-pt confirmed appt for 3/4/22-BB     OB History          2    Para   2    Term   0            AB        Living   2         SAB        IAB        Ectopic        Multiple        Live Births   2               Family History   Problem Relation Age of Onset    Hypertension Mother     Peripheral vascular disease Mother         renal artery stenosis    Skin cancer Mother     Hyperlipidemia Father     Heart disease Father 80        cad, in 70s    Melanoma Father     Skin cancer Sister     Breast cancer Neg Hx     Colon cancer Neg Hx     Ovarian cancer Neg Hx     Cancer Neg Hx      Social History     Tobacco Use    Smoking status: Never    Smokeless tobacco: Never   Substance Use Topics     Alcohol use: Yes     Comment: daily, beer tonight    Drug use: No       Current Outpatient Medications   Medication Sig    desoximetasone (TOPICORT) 0.25 % cream Apply topically 2 (two) times daily. x 1-2 wks then prn flares only    pantoprazole (PROTONIX) 20 MG tablet Take 1 tablet (20 mg total) by mouth once daily.     No current facility-administered medications for this visit.         Review of Systems:  Review of Systems   Constitutional:  Negative for activity change, appetite change, fever and unexpected weight change.   Respiratory:  Negative for cough and shortness of breath.    Cardiovascular:  Negative for chest pain.   Gastrointestinal:  Negative for abdominal pain, blood in stool, constipation, diarrhea, nausea and vomiting.   Genitourinary:  Negative for dyspareunia, dysuria, hematuria, pelvic pain, vaginal bleeding, vaginal discharge, vaginal pain, urinary incontinence, postcoital bleeding, postmenopausal bleeding, vaginal dryness and vaginal odor.   Integumentary:  Negative for breast mass.   Neurological:  Negative for headaches.   Psychiatric/Behavioral:  Negative for sleep disturbance.    Breast: Negative for lump and mass     OBJECTIVE:     Physical Exam:  Physical Exam  Vitals reviewed.   Constitutional:       General: She is not in acute distress.     Appearance: She is well-developed. She is not diaphoretic.   HENT:      Head: Normocephalic and atraumatic.   Eyes:      General: No scleral icterus.        Right eye: No discharge.         Left eye: No discharge.      Conjunctiva/sclera: Conjunctivae normal.   Neck:      Thyroid: No thyromegaly.   Cardiovascular:      Rate and Rhythm: Normal rate.   Pulmonary:      Effort: Pulmonary effort is normal.   Chest:   Breasts:     Breasts are symmetrical.      Right: No inverted nipple, mass, nipple discharge, skin change or tenderness.      Left: No inverted nipple, mass, nipple discharge, skin change or tenderness.   Abdominal:      General: There  is no distension.      Palpations: Abdomen is soft.      Tenderness: There is no abdominal tenderness.   Genitourinary:     Labia:         Right: No rash, tenderness, lesion or injury.         Left: No rash, tenderness, lesion or injury.       Vagina: Normal. No signs of injury and foreign body. No vaginal discharge, erythema, tenderness or bleeding.      Cervix: No cervical motion tenderness, discharge or friability.      Uterus: Not deviated, not enlarged, not fixed and not tender.       Adnexa:         Right: No mass, tenderness or fullness.          Left: No mass, tenderness or fullness.     Musculoskeletal:         General: Normal range of motion.      Cervical back: Normal range of motion and neck supple.   Lymphadenopathy:      Cervical: No cervical adenopathy.   Skin:     General: Skin is warm and dry.      Findings: No erythema or rash.   Neurological:      Mental Status: She is alert and oriented to person, place, and time.         ASSESSMENT:       ICD-10-CM ICD-9-CM    1. Well woman exam with routine gynecological exam  Z01.419 V72.31              Plan:      WWE  - Postmenopausal.   - Vaccines utd  - Labs utd  - Mammogram, Colonoscopy utd  - Pap collected   - DEXA ordered due to low bmi  - Vitamin D and Calcium recs given  - CBE normal. Physical exam normal. VSS      Counseling time: 30 minutes    Cherelle Lopez

## 2023-06-04 LAB
FINAL PATHOLOGIC DIAGNOSIS: NORMAL
Lab: NORMAL

## 2023-06-06 LAB
HPV HR 12 DNA SPEC QL NAA+PROBE: NEGATIVE
HPV16 AG SPEC QL: NEGATIVE
HPV18 DNA SPEC QL NAA+PROBE: NEGATIVE

## 2023-06-14 ENCOUNTER — PATIENT MESSAGE (OUTPATIENT)
Dept: INTERNAL MEDICINE | Facility: CLINIC | Age: 58
End: 2023-06-14
Payer: COMMERCIAL

## 2023-06-15 ENCOUNTER — PATIENT MESSAGE (OUTPATIENT)
Dept: INTERNAL MEDICINE | Facility: CLINIC | Age: 58
End: 2023-06-15
Payer: COMMERCIAL

## 2023-06-20 ENCOUNTER — HOSPITAL ENCOUNTER (OUTPATIENT)
Dept: RADIOLOGY | Facility: OTHER | Age: 58
Discharge: HOME | End: 2023-06-20
Attending: STUDENT IN AN ORGANIZED HEALTH CARE EDUCATION/TRAINING PROGRAM
Payer: COMMERCIAL

## 2023-06-20 DIAGNOSIS — M85.80 OSTEOPENIA, UNSPECIFIED LOCATION: Primary | ICD-10-CM

## 2023-06-20 DIAGNOSIS — Z01.419 WELL WOMAN EXAM WITH ROUTINE GYNECOLOGICAL EXAM: ICD-10-CM

## 2023-06-20 PROCEDURE — 77080 DXA BONE DENSITY AXIAL: CPT | Mod: 26,,, | Performed by: RADIOLOGY

## 2023-06-20 PROCEDURE — 77080 DXA BONE DENSITY AXIAL: CPT | Mod: TC

## 2023-06-20 PROCEDURE — 77080 DXA BONE DENSITY AXIAL SKELETON 1 OR MORE SITES: ICD-10-PCS | Mod: 26,,, | Performed by: RADIOLOGY

## 2023-07-06 ENCOUNTER — PATIENT MESSAGE (OUTPATIENT)
Dept: INTERNAL MEDICINE | Facility: CLINIC | Age: 58
End: 2023-07-06
Payer: COMMERCIAL

## 2023-07-06 RX ORDER — CITALOPRAM 10 MG/1
10 TABLET ORAL DAILY
Qty: 30 TABLET | Refills: 2 | Status: SHIPPED | OUTPATIENT
Start: 2023-07-06 | End: 2023-12-12

## 2023-07-06 RX ORDER — HYDROXYZINE HYDROCHLORIDE 25 MG/1
25 TABLET, FILM COATED ORAL 2 TIMES DAILY PRN
Qty: 20 TABLET | Refills: 0 | Status: SHIPPED | OUTPATIENT
Start: 2023-07-06 | End: 2023-12-12

## 2023-07-06 NOTE — TELEPHONE ENCOUNTER
"Hi, please call her -- I recommend a daily medicine called celexa or:  Orders Placed This Encounter    citalopram (CELEXA) 10 MG tablet     "Most people tolerate this med well, but it's possible to experience some side effects. The most common ones include:    Nausea or upset stomach  Headache  Drowsiness or fatigue  Sweating  Restlessness or anxiety at the beginning of treatment"    Usually these symptoms ease after 1-3 weeks.    After taking it daily she will hopefully feel like anxiety eases after 3-4 weeks.    Please offer a video appt with me for 8/4, unless she wants an in person appt, then we can go over how she is doing with the medicine and overall.    Let me know if patient has any questions.  Thank you, Manoj Stein    "

## 2023-07-06 NOTE — TELEPHONE ENCOUNTER
Called and spoke with pt, informed her of Dr. Stein's recommendations. States she wasn't prepared to take a daily medication but was looking into something PRN so she will have to think on this. Offered her the 8/4 virtual appt states she is going out of town but will reach out when she gets back to get scheduled for an appt early August

## 2023-07-06 NOTE — TELEPHONE ENCOUNTER
Hi, here is an as needed medicine she can take instead  Orders Placed This Encounter        hydrOXYzine HCL (ATARAX) 25 MG tablet     I hope this helps,  Let me know if patient has any questions.  Thank you, Manoj Stein

## 2023-07-07 ENCOUNTER — PATIENT MESSAGE (OUTPATIENT)
Dept: INTERNAL MEDICINE | Facility: CLINIC | Age: 58
End: 2023-07-07
Payer: COMMERCIAL

## 2023-07-07 RX ORDER — ALPRAZOLAM 0.25 MG/1
0.25 TABLET ORAL 2 TIMES DAILY PRN
Qty: 20 TABLET | Refills: 0 | Status: SHIPPED | OUTPATIENT
Start: 2023-07-07 | End: 2023-08-06

## 2023-07-07 NOTE — TELEPHONE ENCOUNTER
Hi, I have sent in:  Orders Placed This Encounter    ALPRAZolam (XANAX) 0.25 MG tablet     20 tabs, I would need to see her for an office visit, in person or video for further refills.    I hope this helps.    Let me know if you have any more questions.  Manoj Stein

## 2023-07-14 ENCOUNTER — OFFICE VISIT (OUTPATIENT)
Dept: PSYCHIATRY | Facility: CLINIC | Age: 58
End: 2023-07-14
Payer: COMMERCIAL

## 2023-07-14 DIAGNOSIS — F43.20 ADJUSTMENT DISORDER, UNSPECIFIED TYPE: Primary | ICD-10-CM

## 2023-07-14 PROCEDURE — 90837 PSYTX W PT 60 MINUTES: CPT | Mod: S$GLB,,, | Performed by: SOCIAL WORKER

## 2023-07-14 PROCEDURE — 90837 PR PSYCHOTHERAPY W/PATIENT, 60 MIN: ICD-10-PCS | Mod: S$GLB,,, | Performed by: SOCIAL WORKER

## 2023-07-14 NOTE — PROGRESS NOTES
Individual Psychotherapy (PhD/LCSW)    7/14/2023       Site:  [facility]         Therapeutic Intervention: Met with patient.    Outpatient - Insight oriented psychotherapy 60 min - CPT code 73315, Outpatient - Behavior modifying psychotherapy 60 min - CPT code 10040, Outpatient - Supportive psychotherapy 60 min - CPT Code 59191, and Outpatient - Interactive psychotherapy 60 min - CPT code 85227     Chief complaint/reason for encounter: interpersonal     Interval history and content of current session: Pt is a 58 year-old  white female who presents today for a follow up therapy session. Pt still frustrated by pattern of interaction with . Still working out financial agreement before divorce is finalized. States dgtr now living in [facility] with 3 other roommates. Started her new job. Pt assisted with the move in. Reports they butted heads some but not surprising. Son laid off and finances a bit disorganized. Recently came out as bi. Pt's response appropriate and supportive despite being surprised.       Treatment plan:  Target symptoms: anxiety , interpersonal issues  Why chosen therapy is appropriate versus another modality: relevant to diagnosis, patient responds to this modality, evidence based practice  Outcome monitoring methods: self-report, observation  Therapeutic intervention type: insight oriented psychotherapy, behavior modifying psychotherapy, supportive psychotherapy    Risk parameters:  Patient reports no suicidal ideation  Patient reports no homicidal ideation  Patient reports no self-injurious behavior  Patient reports no violent behavior    Verbal deficits: None    Patient's response to intervention:  The patient's response to intervention is accepting.    Progress toward goals and other mental status changes:  The patient's progress toward goals is excellent.    Diagnosis:     ICD-10-CM ICD-9-CM   1. Adjustment disorder, unspecified type  F43.20 309.9       Plan:  individual  psychotherapy    Return to clinic: 2 weeks    Length of Service (minutes): 60

## 2023-07-25 ENCOUNTER — OFFICE VISIT (OUTPATIENT)
Dept: PSYCHIATRY | Facility: CLINIC | Age: 58
End: 2023-07-25
Payer: COMMERCIAL

## 2023-07-25 DIAGNOSIS — F43.20 ADJUSTMENT DISORDER, UNSPECIFIED TYPE: Primary | ICD-10-CM

## 2023-07-25 PROCEDURE — 90837 PSYTX W PT 60 MINUTES: CPT | Mod: 95,,, | Performed by: SOCIAL WORKER

## 2023-07-25 PROCEDURE — 90837 PR PSYCHOTHERAPY W/PATIENT, 60 MIN: ICD-10-PCS | Mod: 95,,, | Performed by: SOCIAL WORKER

## 2023-07-25 NOTE — PROGRESS NOTES
Individual Psychotherapy (PhD/LCSW)    7/25/2023     The patient location is: [place], [place]  The chief complaint leading to consultation is: interpersonal    Visit type: audiovisual    Face to Face time with patient: 53 min  60 minutes of total time spent on the encounter, which includes face to face time and non-face to face time preparing to see the patient (eg, review of tests), Obtaining and/or reviewing separately obtained history, Documenting clinical information in the electronic or other health record, Independently interpreting results (not separately reported) and communicating results to the patient/family/caregiver, or Care coordination (not separately reported).         Each patient to whom he or she provides medical services by telemedicine is:  (1) informed of the relationship between the physician and patient and the respective role of any other health care provider with respect to management of the patient; and (2) notified that he or she may decline to receive medical services by telemedicine and may withdraw from such care at any time.    Notes:         Site:  [facility]         Therapeutic Intervention: Met with patient.    Outpatient - Insight oriented psychotherapy 60 min - CPT code 72620, Outpatient - Behavior modifying psychotherapy 60 min - CPT code 66205, Outpatient - Supportive psychotherapy 60 min - CPT Code 70803, and Outpatient - Interactive psychotherapy 60 min - CPT code 10622     Chief complaint/reason for encounter: interpersonal     Interval history and content of current session: Pt is a 58 year-old  white female who presents today for a follow up therapy session. Pt endorsing some sadness around separation. Feeling stuck as things are moving slowly. Pt shows good insight into the things that are keeping her stuck. Recognizes that things will likely feel like they are moving in late fall. Focused on trying to fill her time. Will join a [employer] class. Still plays  [employer] with friends. Plans on doing some volunteer work. Pt reports a positive trip to visit sister in [place]. Still getting exercise at local gym and walking when it's cooled down.       Treatment plan:  Target symptoms: anxiety , interpersonal issues  Why chosen therapy is appropriate versus another modality: relevant to diagnosis, patient responds to this modality, evidence based practice  Outcome monitoring methods: self-report, observation  Therapeutic intervention type: insight oriented psychotherapy, behavior modifying psychotherapy, supportive psychotherapy    Risk parameters:  Patient reports no suicidal ideation  Patient reports no homicidal ideation  Patient reports no self-injurious behavior  Patient reports no violent behavior    Verbal deficits: None    Patient's response to intervention:  The patient's response to intervention is accepting.    Progress toward goals and other mental status changes:  The patient's progress toward goals is excellent.    Diagnosis:     ICD-10-CM ICD-9-CM   1. Adjustment disorder, unspecified type  F43.20 309.9       Plan:  individual psychotherapy    Return to clinic: 2 weeks    Length of Service (minutes): 60

## 2023-08-03 ENCOUNTER — PATIENT MESSAGE (OUTPATIENT)
Dept: PSYCHIATRY | Facility: CLINIC | Age: 58
End: 2023-08-03
Payer: COMMERCIAL

## 2023-08-04 ENCOUNTER — OFFICE VISIT (OUTPATIENT)
Dept: PSYCHIATRY | Facility: CLINIC | Age: 58
End: 2023-08-04
Payer: COMMERCIAL

## 2023-08-04 DIAGNOSIS — F43.20 ADJUSTMENT DISORDER, UNSPECIFIED TYPE: Primary | ICD-10-CM

## 2023-08-04 PROCEDURE — 90837 PR PSYCHOTHERAPY W/PATIENT, 60 MIN: ICD-10-PCS | Mod: S$GLB,,, | Performed by: SOCIAL WORKER

## 2023-08-04 PROCEDURE — 90837 PSYTX W PT 60 MINUTES: CPT | Mod: S$GLB,,, | Performed by: SOCIAL WORKER

## 2023-08-04 NOTE — PROGRESS NOTES
Individual Psychotherapy (PhD/LCSW)    8/4/2023     Site:  [facility]         Therapeutic Intervention: Met with patient.    Outpatient - Insight oriented psychotherapy 60 min - CPT code 53100, Outpatient - Behavior modifying psychotherapy 60 min - CPT code 30557, Outpatient - Supportive psychotherapy 60 min - CPT Code 34463, and Outpatient - Interactive psychotherapy 60 min - CPT code 64906     Chief complaint/reason for encounter: interpersonal     Interval history and content of current session: Pt is a 58 year-old  white female who presents today for a follow up therapy session. Pt in good spirits today. Was direct with  that their relationship keeps going in circles. Was clear with importance of being close even if not .  hearing this per pt. Still unsure when divorce will be finalized. Networking with professionals for consulting work and feeling good about this. Allowing self to be seen more than in the past.     Treatment plan:  Target symptoms: anxiety , interpersonal issues  Why chosen therapy is appropriate versus another modality: relevant to diagnosis, patient responds to this modality, evidence based practice  Outcome monitoring methods: self-report, observation  Therapeutic intervention type: insight oriented psychotherapy, behavior modifying psychotherapy, supportive psychotherapy    Risk parameters:  Patient reports no suicidal ideation  Patient reports no homicidal ideation  Patient reports no self-injurious behavior  Patient reports no violent behavior    Verbal deficits: None    Patient's response to intervention:  The patient's response to intervention is accepting.    Progress toward goals and other mental status changes:  The patient's progress toward goals is excellent.    Diagnosis:     ICD-10-CM ICD-9-CM   1. Adjustment disorder, unspecified type  F43.20 309.9       Plan:  individual psychotherapy    Return to clinic: 2 weeks    Length of Service  (minutes): 60

## 2023-08-10 ENCOUNTER — PATIENT MESSAGE (OUTPATIENT)
Dept: OPTOMETRY | Facility: CLINIC | Age: 58
End: 2023-08-10
Payer: COMMERCIAL

## 2023-08-11 ENCOUNTER — TELEPHONE (OUTPATIENT)
Dept: OPTOMETRY | Facility: CLINIC | Age: 58
End: 2023-08-11
Payer: COMMERCIAL

## 2023-08-11 NOTE — TELEPHONE ENCOUNTER
Extended CLRx as pt requested  Contact Lens Final Rx       Final Contact Lens Rx         Brand Base Curve Diameter Sphere Cylinder Add    Right AV 1 day MAX Multifocal 8.4 14.3 +1.25 Sphere med    Left AV 1 day MAX Multifocal 8.4 14.3 +1.25 Sphere med      Expiration Date: 2/11/2024    Replacement: Daily    Wearing Schedule: Daily Wear

## 2023-08-17 ENCOUNTER — PATIENT MESSAGE (OUTPATIENT)
Dept: OPTOMETRY | Facility: CLINIC | Age: 58
End: 2023-08-17
Payer: COMMERCIAL

## 2023-09-12 ENCOUNTER — OFFICE VISIT (OUTPATIENT)
Dept: PSYCHIATRY | Facility: CLINIC | Age: 58
End: 2023-09-12
Payer: COMMERCIAL

## 2023-09-12 DIAGNOSIS — F43.20 ADJUSTMENT DISORDER, UNSPECIFIED TYPE: Primary | ICD-10-CM

## 2023-09-12 PROCEDURE — 90837 PR PSYCHOTHERAPY W/PATIENT, 60 MIN: ICD-10-PCS | Mod: S$GLB,,, | Performed by: SOCIAL WORKER

## 2023-09-12 PROCEDURE — 90837 PSYTX W PT 60 MINUTES: CPT | Mod: S$GLB,,, | Performed by: SOCIAL WORKER

## 2023-09-12 NOTE — PROGRESS NOTES
"  Individual Psychotherapy (PhD/LCSW)    9/12/2023     Site:  [facility]         Therapeutic Intervention: Met with patient.    Outpatient - Insight oriented psychotherapy 60 min - CPT code 29821, Outpatient - Behavior modifying psychotherapy 60 min - CPT code 57773, Outpatient - Supportive psychotherapy 60 min - CPT Code 23585, and Outpatient - Interactive psychotherapy 60 min - CPT code 40921     Chief complaint/reason for encounter: interpersonal     Interval history and content of current session: Pt is a 58 year-old  white female who presents today for a follow up therapy session. Pt doing well. Focusing on networking. Stressed about some of the family dynamics related to holidays. Still on the fence about how to spend time "as a family" even though  and progressing toward finalizing details of divorce. Discussed strategies, validated pt's frustration with circuitous nature of conversations with .     Treatment plan:  Target symptoms: anxiety , interpersonal issues  Why chosen therapy is appropriate versus another modality: relevant to diagnosis, patient responds to this modality, evidence based practice  Outcome monitoring methods: self-report, observation  Therapeutic intervention type: insight oriented psychotherapy, behavior modifying psychotherapy, supportive psychotherapy    Risk parameters:  Patient reports no suicidal ideation  Patient reports no homicidal ideation  Patient reports no self-injurious behavior  Patient reports no violent behavior    Verbal deficits: None    Patient's response to intervention:  The patient's response to intervention is accepting.    Progress toward goals and other mental status changes:  The patient's progress toward goals is excellent.    Diagnosis:     ICD-10-CM ICD-9-CM   1. Adjustment disorder, unspecified type  F43.20 309.9       Plan:  individual psychotherapy    Return to clinic: 2 weeks    Length of Service (minutes): 60    "

## 2023-09-13 ENCOUNTER — OFFICE VISIT (OUTPATIENT)
Dept: PSYCHIATRY | Facility: CLINIC | Age: 58
End: 2023-09-13
Payer: COMMERCIAL

## 2023-09-13 DIAGNOSIS — F43.20 ADJUSTMENT DISORDER, UNSPECIFIED TYPE: Primary | ICD-10-CM

## 2023-09-13 PROCEDURE — 90837 PSYTX W PT 60 MINUTES: CPT | Mod: S$GLB,,, | Performed by: SOCIAL WORKER

## 2023-09-13 PROCEDURE — 90837 PR PSYCHOTHERAPY W/PATIENT, 60 MIN: ICD-10-PCS | Mod: S$GLB,,, | Performed by: SOCIAL WORKER

## 2023-09-13 NOTE — PROGRESS NOTES
Individual Psychotherapy (PhD/LCSW)    9/13/2023     Site:  [facility]         Therapeutic Intervention: Met with patient.    Outpatient - Insight oriented psychotherapy 60 min - CPT code 50932, Outpatient - Behavior modifying psychotherapy 60 min - CPT code 83198, Outpatient - Supportive psychotherapy 60 min - CPT Code 70569, and Outpatient - Interactive psychotherapy 60 min - CPT code 06487     Chief complaint/reason for encounter: interpersonal     Interval history and content of current session: Pt is a 58 year-old  white female who presents today for a follow up therapy session. Spent session focusing on relationship with pt's dgtr. Pt worries that the relationship will change due to divorce with . Reviewed whether her perspective on this was realistic. Pt endorses a lot of negative beliefs about herself that are not based in fact. Reviewed some of pt's black and white thinking around relationships with children and friends. Pt and clinician problem solved some of pt's difficulty in deciding what to do about Thanksgiving. Goal is for pt to figure out what she wants to do and then she will notify  of her plans with the kids. He can then make separate plans with the children as long as it does not interfere with pt's plan.     Treatment plan:  Target symptoms: anxiety , interpersonal issues  Why chosen therapy is appropriate versus another modality: relevant to diagnosis, patient responds to this modality, evidence based practice  Outcome monitoring methods: self-report, observation  Therapeutic intervention type: insight oriented psychotherapy, behavior modifying psychotherapy, supportive psychotherapy    Risk parameters:  Patient reports no suicidal ideation  Patient reports no homicidal ideation  Patient reports no self-injurious behavior  Patient reports no violent behavior    Verbal deficits: None    Patient's response to intervention:  The patient's response to intervention is  accepting.    Progress toward goals and other mental status changes:  The patient's progress toward goals is excellent.    Diagnosis:     ICD-10-CM ICD-9-CM   1. Adjustment disorder, unspecified type  F43.20 309.9       Plan:  individual psychotherapy    Return to clinic: 2 weeks    Length of Service (minutes): 60

## 2023-09-26 ENCOUNTER — OFFICE VISIT (OUTPATIENT)
Dept: PSYCHIATRY | Facility: CLINIC | Age: 58
End: 2023-09-26
Payer: COMMERCIAL

## 2023-09-26 DIAGNOSIS — F43.20 ADJUSTMENT DISORDER, UNSPECIFIED TYPE: Primary | ICD-10-CM

## 2023-09-26 PROCEDURE — 90837 PSYTX W PT 60 MINUTES: CPT | Mod: S$GLB,,, | Performed by: SOCIAL WORKER

## 2023-09-26 PROCEDURE — 90837 PR PSYCHOTHERAPY W/PATIENT, 60 MIN: ICD-10-PCS | Mod: S$GLB,,, | Performed by: SOCIAL WORKER

## 2023-09-26 NOTE — PROGRESS NOTES
"  Individual Psychotherapy (PhD/LCSW)    9/26/2023     Site:  [facility]         Therapeutic Intervention: Met with patient.    Outpatient - Insight oriented psychotherapy 60 min - CPT code 65075, Outpatient - Behavior modifying psychotherapy 60 min - CPT code 60764, Outpatient - Supportive psychotherapy 60 min - CPT Code 40907, and Outpatient - Interactive psychotherapy 60 min - CPT code 04500     Chief complaint/reason for encounter: interpersonal     Interval history and content of current session: Pt is a 58 year-old  white female who presents today for a follow up therapy session. Pt feeling more grounded this week. Was able to make some nice plans with girlfriends. Celebrated Alejandro Leroy and enjoyed this. Reports that she has plans to ask her children to coordinate their flights to [place] and then ask them what they would like to do. Taking some of the responsibility off of her plate for planning all of their time down here. Pt reports that she has some networking meetings with folks in the community to discuss possible work opportunities. Pt reading a book on tape, "Build the Life You Want." Enjoying this and taking notes. Finds it helpful as a reframe for happiness.     Treatment plan:  Target symptoms: anxiety , interpersonal issues  Why chosen therapy is appropriate versus another modality: relevant to diagnosis, patient responds to this modality, evidence based practice  Outcome monitoring methods: self-report, observation  Therapeutic intervention type: insight oriented psychotherapy, behavior modifying psychotherapy, supportive psychotherapy    Risk parameters:  Patient reports no suicidal ideation  Patient reports no homicidal ideation  Patient reports no self-injurious behavior  Patient reports no violent behavior    Verbal deficits: None    Patient's response to intervention:  The patient's response to intervention is accepting.    Progress toward goals and other mental status " changes:  The patient's progress toward goals is excellent.    Diagnosis:     ICD-10-CM ICD-9-CM   1. Adjustment disorder, unspecified type  F43.20 309.9       Plan:  individual psychotherapy    Return to clinic: 2 weeks    Length of Service (minutes): 60

## 2023-10-10 ENCOUNTER — OFFICE VISIT (OUTPATIENT)
Dept: PSYCHIATRY | Facility: CLINIC | Age: 58
End: 2023-10-10
Payer: COMMERCIAL

## 2023-10-10 DIAGNOSIS — F43.20 ADJUSTMENT DISORDER, UNSPECIFIED TYPE: Primary | ICD-10-CM

## 2023-10-10 PROCEDURE — 90837 PSYTX W PT 60 MINUTES: CPT | Mod: S$GLB,,, | Performed by: SOCIAL WORKER

## 2023-10-10 PROCEDURE — 90837 PR PSYCHOTHERAPY W/PATIENT, 60 MIN: ICD-10-PCS | Mod: S$GLB,,, | Performed by: SOCIAL WORKER

## 2023-10-10 NOTE — PROGRESS NOTES
Individual Psychotherapy (PhD/LCSW)    10/10/2023     Site:  [facility]         Therapeutic Intervention: Met with patient.    Outpatient - Insight oriented psychotherapy 60 min - CPT code 23842, Outpatient - Behavior modifying psychotherapy 60 min - CPT code 37143, Outpatient - Supportive psychotherapy 60 min - CPT Code 20928, and Outpatient - Interactive psychotherapy 60 min - CPT code 24405     Chief complaint/reason for encounter: interpersonal     Interval history and content of current session: Pt is a 58 year-old  white female who presents today for a follow up therapy session. Pt concerned about daughter whose wallet was stolen in [place] a few weekends ago. States that she is observing frustration from dgtr toward  . Validating dgtr however states dgtr sometimes projects that frustration onto pt. Pt reports that she is still up in the air about Thanksgiving plans. Working to figure out financial agreement prior to divorce proceedings.     Treatment plan:  Target symptoms: anxiety , interpersonal issues  Why chosen therapy is appropriate versus another modality: relevant to diagnosis, patient responds to this modality, evidence based practice  Outcome monitoring methods: self-report, observation  Therapeutic intervention type: insight oriented psychotherapy, behavior modifying psychotherapy, supportive psychotherapy    Risk parameters:  Patient reports no suicidal ideation  Patient reports no homicidal ideation  Patient reports no self-injurious behavior  Patient reports no violent behavior    Verbal deficits: None    Patient's response to intervention:  The patient's response to intervention is accepting.    Progress toward goals and other mental status changes:  The patient's progress toward goals is excellent.    Diagnosis:     ICD-10-CM ICD-9-CM   1. Adjustment disorder, unspecified type  F43.20 309.9       Plan:  individual psychotherapy    Return to clinic: 2 weeks    Length  of Service (minutes): 60

## 2023-10-24 ENCOUNTER — OFFICE VISIT (OUTPATIENT)
Dept: PSYCHIATRY | Facility: CLINIC | Age: 58
End: 2023-10-24
Payer: COMMERCIAL

## 2023-10-24 DIAGNOSIS — F43.20 ADJUSTMENT DISORDER, UNSPECIFIED TYPE: Primary | ICD-10-CM

## 2023-10-24 PROCEDURE — 90837 PR PSYCHOTHERAPY W/PATIENT, 60 MIN: ICD-10-PCS | Mod: S$GLB,,, | Performed by: SOCIAL WORKER

## 2023-10-24 PROCEDURE — 90837 PSYTX W PT 60 MINUTES: CPT | Mod: S$GLB,,, | Performed by: SOCIAL WORKER

## 2023-10-24 NOTE — PROGRESS NOTES
Individual Psychotherapy (PhD/LCSW)    10/24/2023     Site:  [facility]         Therapeutic Intervention: Met with patient.    Outpatient - Insight oriented psychotherapy 60 min - CPT code 70500, Outpatient - Behavior modifying psychotherapy 60 min - CPT code 00215, Outpatient - Supportive psychotherapy 60 min - CPT Code 93173, and Outpatient - Interactive psychotherapy 60 min - CPT code 27044     Chief complaint/reason for encounter: interpersonal     Interval history and content of current session: Pt is a 58 year-old  white female who presents today for a follow up therapy session. Pt reports that she is doing okay. Still has not found a job, however continues to look. Reports that she and  meet with their attorneys on Friday. Hoping to use this meeting to move the process forward. He has been texting her less. Pt reports that she may be going to [place] to stay with sister for Gay. Kids would join. Kids are coming down for Thanksgiving. Pt amenable to including  to keep things civil. Spent some time processing the conflict in the [place]. Pt setting good boundaries around how much she watches the news. Overall, pt appears to be moving forward with her own process. Has an easier time accepting life after the divorce.     Treatment plan:  Target symptoms: anxiety , interpersonal issues  Why chosen therapy is appropriate versus another modality: relevant to diagnosis, patient responds to this modality, evidence based practice  Outcome monitoring methods: self-report, observation  Therapeutic intervention type: insight oriented psychotherapy, behavior modifying psychotherapy, supportive psychotherapy    Risk parameters:  Patient reports no suicidal ideation  Patient reports no homicidal ideation  Patient reports no self-injurious behavior  Patient reports no violent behavior    Verbal deficits: None    Patient's response to intervention:  The patient's response to  intervention is accepting.    Progress toward goals and other mental status changes:  The patient's progress toward goals is excellent.    Diagnosis:     ICD-10-CM ICD-9-CM   1. Adjustment disorder, unspecified type  F43.20 309.9       Plan:  individual psychotherapy    Return to clinic: 2 weeks    Length of Service (minutes): 60

## 2023-10-30 ENCOUNTER — OFFICE VISIT (OUTPATIENT)
Dept: DERMATOLOGY | Facility: CLINIC | Age: 58
End: 2023-10-30
Payer: COMMERCIAL

## 2023-10-30 DIAGNOSIS — D22.9 MULTIPLE BENIGN NEVI: ICD-10-CM

## 2023-10-30 DIAGNOSIS — D48.5 NEOPLASM OF UNCERTAIN BEHAVIOR OF SKIN: ICD-10-CM

## 2023-10-30 DIAGNOSIS — L82.1 SK (SEBORRHEIC KERATOSIS): ICD-10-CM

## 2023-10-30 DIAGNOSIS — D18.01 CHERRY ANGIOMA: ICD-10-CM

## 2023-10-30 DIAGNOSIS — L73.8 SEBACEOUS HYPERPLASIA: ICD-10-CM

## 2023-10-30 DIAGNOSIS — Z12.83 SKIN CANCER SCREENING: Primary | ICD-10-CM

## 2023-10-30 PROCEDURE — 88305 TISSUE EXAM BY PATHOLOGIST: CPT | Performed by: PATHOLOGY

## 2023-10-30 PROCEDURE — 99213 PR OFFICE/OUTPT VISIT, EST, LEVL III, 20-29 MIN: ICD-10-PCS | Mod: 25,S$GLB,, | Performed by: DERMATOLOGY

## 2023-10-30 PROCEDURE — 99999 PR PBB SHADOW E&M-EST. PATIENT-LVL III: ICD-10-PCS | Mod: PBBFAC,,, | Performed by: DERMATOLOGY

## 2023-10-30 PROCEDURE — 11102 TANGNTL BX SKIN SINGLE LES: CPT | Mod: S$GLB,,, | Performed by: DERMATOLOGY

## 2023-10-30 PROCEDURE — 1159F MED LIST DOCD IN RCRD: CPT | Mod: CPTII,S$GLB,, | Performed by: DERMATOLOGY

## 2023-10-30 PROCEDURE — 1160F PR REVIEW ALL MEDS BY PRESCRIBER/CLIN PHARMACIST DOCUMENTED: ICD-10-PCS | Mod: CPTII,S$GLB,, | Performed by: DERMATOLOGY

## 2023-10-30 PROCEDURE — 99213 OFFICE O/P EST LOW 20 MIN: CPT | Mod: 25,S$GLB,, | Performed by: DERMATOLOGY

## 2023-10-30 PROCEDURE — 11102 PR TANGENTIAL BIOPSY, SKIN, SINGLE LESION: ICD-10-PCS | Mod: S$GLB,,, | Performed by: DERMATOLOGY

## 2023-10-30 PROCEDURE — 88305 TISSUE EXAM BY PATHOLOGIST: CPT | Mod: 26,,, | Performed by: PATHOLOGY

## 2023-10-30 PROCEDURE — 1160F RVW MEDS BY RX/DR IN RCRD: CPT | Mod: CPTII,S$GLB,, | Performed by: DERMATOLOGY

## 2023-10-30 PROCEDURE — 99999 PR PBB SHADOW E&M-EST. PATIENT-LVL III: CPT | Mod: PBBFAC,,, | Performed by: DERMATOLOGY

## 2023-10-30 PROCEDURE — 1159F PR MEDICATION LIST DOCUMENTED IN MEDICAL RECORD: ICD-10-PCS | Mod: CPTII,S$GLB,, | Performed by: DERMATOLOGY

## 2023-10-30 PROCEDURE — 88305 TISSUE EXAM BY PATHOLOGIST: ICD-10-PCS | Mod: 26,,, | Performed by: PATHOLOGY

## 2023-10-30 NOTE — PROGRESS NOTES
Subjective:      Patient ID:  Rhoda Daly is a 58 y.o. female who presents for   Chief Complaint   Patient presents with    Skin Check     TBSE     Growth     Forehead X4mos, bump, otc retin-a      Growth - Initial  Affected locations: forehead  Duration: 4 months  Severity: mild to moderate  Timing: constant      Interested in total body skin check today.  Has also felt bumps on the backs of both upper arms. Asymptomatic and no prior treatment.    Review of Systems   Constitutional:  Negative for fever and chills.   Respiratory:  Negative for cough and shortness of breath.    Gastrointestinal:  Negative for nausea and vomiting.   Musculoskeletal:  Negative for joint swelling and arthralgias.   Skin:  Positive for daily sunscreen use (face). Negative for activity-related sunscreen use, recent sunburn and wears hat.   All other systems reviewed and are negative.  Hematologic/Lymphatic: Does not bruise/bleed easily.       Objective:   Physical Exam   Constitutional: She appears well-developed and well-nourished. No distress.   Neurological: She is alert and oriented to person, place, and time. She is not disoriented.   Psychiatric: She has a normal mood and affect.   Skin:   Areas Examined (abnormalities noted in diagram):   Scalp / Hair Palpated and Inspected  Head / Face Inspection Performed  Neck Inspection Performed  Chest / Axilla Inspection Performed  Abdomen Inspection Performed  Genitals / Buttocks / Groin Inspection Performed  Back Inspection Performed  RUE Inspected  LUE Inspection Performed  RLE Inspected  LLE Inspection Performed  Nails and Digits Inspection Performed                     Diagram Legend     Erythematous scaling macule/papule c/w actinic keratosis       Vascular papule c/w angioma      Pigmented verrucoid papule/plaque c/w seborrheic keratosis      Yellow umbilicated papule c/w sebaceous hyperplasia      Irregularly shaped tan macule c/w lentigo     1-2 mm smooth white papules  consistent with Milia      Movable subcutaneous cyst with punctum c/w epidermal inclusion cyst      Subcutaneous movable cyst c/w pilar cyst      Firm pink to brown papule c/w dermatofibroma      Pedunculated fleshy papule(s) c/w skin tag(s)      Evenly pigmented macule c/w junctional nevus     Mildly variegated pigmented, slightly irregular-bordered macule c/w mildly atypical nevus      Flesh colored to evenly pigmented papule c/w intradermal nevus       Pink pearly papule/plaque c/w basal cell carcinoma      Erythematous hyperkeratotic cursted plaque c/w SCC      Surgical scar with no sign of skin cancer recurrence      Open and closed comedones      Inflammatory papules and pustules      Verrucoid papule consistent consistent with wart     Erythematous eczematous patches and plaques     Dystrophic onycholytic nail with subungual debris c/w onychomycosis     Umbilicated papule    Erythematous-base heme-crusted tan verrucoid plaque consistent with inflamed seborrheic keratosis     Erythematous Silvery Scaling Plaque c/w Psoriasis     See annotation      Assessment / Plan:      Neoplasm of uncertain behavior of skin  Shave biopsy procedure note:    Shave biopsy performed after verbal consent including risk of infection, scar, recurrence, need for additional treatment of site. Area prepped with alcohol, anesthetized with approximately 1.0cc of 1% lidocaine with epinephrine. Lesional tissue shaved with razor blade. Hemostasis achieved with application of aluminum chloride followed by hyfrecation. No complications. Dressing applied. Wound care explained.    -     Specimen to Pathology, Dermatology  Pathology Orders:       Normal Orders This Visit    Specimen to Pathology, Dermatology     Questions:    Procedure Type: Dermatology and skin neoplasms    Number of Specimens: 1    ------------------------: -------------------------    Spec 1 Procedure: Biopsy    Spec 1 Clinical Impression: r/o BCC vs inflamed cyst vs other     Spec 1 Source: R lower forehead    Release to patient:           Skin cancer screening  Total body skin examination performed today including at least 12 points as noted in physical examination. No lesions suspicious for malignancy noted.  Patient instructed in importance of daily broad spectrum sunscreen use with spf at least 30. Sun avoidance and topical protection/protective clothing discussed.    Multiple benign nevi  Benign-appearing nevi present on exam today. Reassurance provided. Periodically examine moles and return to clinic if any moles change or become symptomatic (bleeding, itching, pain, etc).    SK (seborrheic keratosis)  These are benign inherited growths without a malignant potential. Reassurance given to patient. No treatment is necessary.   Treatment of benign, asymptomatic lesions may be considered cosmetic.  Warned about risk of hypo- or hyperpigmentation with treatment and risk of recurrence.    Cherry angioma  This is a benign vascular lesion. Reassurance given. No treatment required. Treatment of benign, asymptomatic lesions may be considered cosmetic.    Sebaceous hyperplasia  This is a benign overgrowth of oil glands. Reassurance given. No treatment required.  Treatment of benign, asymptomatic lesions may be considered cosmetic.      Follow up in about 6 months (around 4/30/2024) for skin check or sooner pending biopsy results.

## 2023-10-30 NOTE — PATIENT INSTRUCTIONS
Sun Protection      The [facility] Department of Dermatology would like to remind you of the importance of sun protection all year round and particularly during the summer when the suns rays are the strongest. It has been proven that both acute and chronic sun exposure damages our cells and leads to skin cancer. Beyond skin cancer, the sun causes 90% of the symptoms of premature skin aging, including wrinkles, lentigines (brown spots), and thin, easily bruised skin. Proper sun protection can help prevent these unwanted conditions.    Many patients report that they dont go in the sun. It has been shown that the average person receives 18 hours of incidental sun exposure per week during activities such as walking through parking lots, driving, or sitting next to windows. This accumulates to several bad sunburns per year!    In choosing sunscreen, you want one that protects against both UVA and UVB rays (broad spectrum). It is recommended that you use one of SPF 30 or higher. It is important to apply the sunscreen about 20 minutes prior to sun exposure. Most sunscreens are chemical sunscreens and a reaction must take place in the skin so that they are effective. If they are applied and then you are immediately exposed to the sun or start sweating, this reaction has not had time to take place and you are therefore unprotected. Sunscreen needs to be reapplied every 2 hours if you are participating in water sports or sweating. We recommend Elta MD or CeraVe sunscreens for daily use; however there are many options and it is most important for you to find one that you will use on a consistent basis.    If you have sensitive skin, you may do best with a sunscreen that contains only physical blockers in the active ingredient section. The only physical blockers available in the [place] currently are titanium dioxide or zinc oxide. These are typically thicker and harder to apply, however they afford very good protection.  Neutrogena Sensitive Skin, Blue Lizard Sensitive Skin (pink top) or Neutrogena Pure and Free are popular ones.     Aside from sunscreen, clothes with UV protection (UPF), wide brimmed hats, and sunglasses are other means of sun protection that we recommend.      Based on a recent study (6/2021) and out of an abundance of caution, we are recommending that you AVOID the following sunscreens as they may contain the carcinogen, benzene:    Spray and gel sunscreens  Any [employer] or [employer]reens brands as well as Max Block and TopCare brands   Neutrogena Ultra Sheer Dry-touch Water Resistant Sunscreen LOTION SPF 70   Neutrogena Sheer Zinc Dry-touch Face Sunscreen LOTION SPF 50   5.   Aveeno Baby Continuous Protection Sensitive Skin Sunscreen LOTION - Broad Spectrum SPF 50    Please note that Benzene is not an ingredient or the degradation product of any ingredient in any sunscreen. This study suggested that the findings are a result of contamination in the manufacturing process. At this point, we don't know how effectively Benzene gets through the skin, if it gets absorbed systemically, and what effects it may have.     We do know that ultraviolet radiation is a well-established carcinogen. Please use daily sun protection/avoidance and use of at least SPF 30, broad-spectrum sunscreen not listed above.                       [facility] - DERMATOLOGY [address]  [address]  [place] [address]  Dept: [phone]  Dept Fax: [phone]                                                                              CRYOSURGERY      Your doctor has used a method called cryosurgery to treat your skin condition. Cryosurgery refers to the use of very cold substances to treat a variety of skin conditions such as warts, pre-skin cancers, molluscum contagiosum, sun spots, and several benign growths. The substance we use in cryosurgery is liquid nitrogen and is so cold (-195 degrees Celsius) that is burns  when administered.     Following treatment in the office, the skin may immediately burn and become red. You may find the area around the lesion is affected as well. It is sometimes necessary to treat not only the lesion, but a small area of the surrounding normal skin to achieve a good response.     A blister, and even a blood filled blister, may form after treatment.   This is a normal response. If the blister is painful, it is acceptable to sterilize a needle and with rubbing alcohol and gently pop the blister. It is important that you gently wash the area with soap and warm water as the blister fluid may contain wart virus if a wart was treated. Do no remove the roof of the blister.     The area treated can take anywhere from 1-3 weeks to heal. Healing time depends on the kind skin lesion treated, the location, and how aggressively the lesion was treated. It is recommended that the areas treated are covered with Vaseline or bacitracin ointment and a band-aid. If a band-aid is not practical, just ointment applied several times per day will do. Keeping these areas moist will speed the healing time.    Treatment with liquid nitrogen can leave a scar. In dark skin, it may be a light or dark scar, in light skin it may be a white or pink scar. These will generally fade with time, but may never go away completely.     If you have any concerns after your treatment, please feel free to call the office.       [address] [place] [address]/ [phone] FAX/ [email or web address]  Shave Biopsy Wound Care    Your doctor has performed a shave biopsy today.  A band aid and vaseline ointment has been placed over the site.  This should remain in place for NO LONGER THAN 48 hours.  It is fine to remove the bandaid after 24 hours, if the area is no longer bleeding. It is recommended that you keep the area dry (do not wet)) for the first 24 hours.  After 24 hours, wash the area with warm soap and water and  apply Vaseline jelly.  Many patients prefer to use Neosporin or Bacitracin ointment.  This is acceptable; however, know that you can develop an allergy to this medication even if you have used it safely for years.  It is important to keep the area moist.  Letting it dry out and get air slows healing time, and will worsen the scar.        If you notice increasing redness, tenderness, pain, or yellow drainage at the biopsy site, please notify your doctor.  These are signs of an infection.    If your biopsy site is bleeding, apply firm pressure for 15 minutes straight.  Repeat for another 15 minutes, if it is still bleeding.   If the surgical site continues to bleed, then please contact your doctor.      For [facility] users:   You will receive your biopsy results in [facility] as soon as they are available. Please be assured that your physician/provider will review your results and will then determine what further treatment, evaluation, or planning is required. You should be contacted by your physician's/provider's office within 5 business days of receiving your results; If not, please reach out to directly. This is one more way [facility] is putting you first.     [facility] [place] [address]/ [phone] FAX/ [email or web address]

## 2023-11-07 ENCOUNTER — OFFICE VISIT (OUTPATIENT)
Dept: PSYCHIATRY | Facility: CLINIC | Age: 58
End: 2023-11-07
Payer: COMMERCIAL

## 2023-11-07 DIAGNOSIS — F43.20 ADJUSTMENT DISORDER, UNSPECIFIED TYPE: Primary | ICD-10-CM

## 2023-11-07 LAB
FINAL PATHOLOGIC DIAGNOSIS: NORMAL
Lab: NORMAL

## 2023-11-07 PROCEDURE — 90837 PSYTX W PT 60 MINUTES: CPT | Mod: S$GLB,,, | Performed by: SOCIAL WORKER

## 2023-11-07 PROCEDURE — 90837 PR PSYCHOTHERAPY W/PATIENT, 60 MIN: ICD-10-PCS | Mod: S$GLB,,, | Performed by: SOCIAL WORKER

## 2023-11-07 NOTE — PROGRESS NOTES
Individual Psychotherapy (PhD/LCSW)    11/7/2023     Site:  [facility]         Therapeutic Intervention: Met with patient.    Outpatient - Insight oriented psychotherapy 60 min - CPT code 52735, Outpatient - Behavior modifying psychotherapy 60 min - CPT code 82840, Outpatient - Supportive psychotherapy 60 min - CPT Code 77801, and Outpatient - Interactive psychotherapy 60 min - CPT code 05674     Chief complaint/reason for encounter: interpersonal     Interval history and content of current session: Pt is a 58 year-old  white female who presents today for a follow up therapy session. Pt focused on how to set boundaries around expressing personal feelings about the divorce with . Reviewed some strategies like writing a letter to  but not sending. Perhaps sharing in next session. Validated and normalized pt's feelings and stress related to finances. Pt states a proposal has been sent to  regarding financials. They are continuing to talk about this proposal. Pt hoping to be able to finalize the divorce before the end of the year as she has been unable to move forward in her personal life. Pt states that she is getting mixed advice from friends.     Trying to figure out plans for Gay. Worries that the kids will want to see their father but pt wants to go to [place] to visit sister. Discussed how pt is not the middle man and can let the kids make that decision. Pt is allowed to do what she wants (visit sister) even if the kids decide to do something different.    Treatment plan:  Target symptoms: anxiety , interpersonal issues  Why chosen therapy is appropriate versus another modality: relevant to diagnosis, patient responds to this modality, evidence based practice  Outcome monitoring methods: self-report, observation  Therapeutic intervention type: insight oriented psychotherapy, behavior modifying psychotherapy, supportive psychotherapy    Risk parameters:  Patient reports no  suicidal ideation  Patient reports no homicidal ideation  Patient reports no self-injurious behavior  Patient reports no violent behavior    Verbal deficits: None    Patient's response to intervention:  The patient's response to intervention is accepting.    Progress toward goals and other mental status changes:  The patient's progress toward goals is excellent.    Diagnosis:     ICD-10-CM ICD-9-CM   1. Adjustment disorder, unspecified type  F43.20 309.9       Plan:  individual psychotherapy    Return to clinic: 2 weeks    Length of Service (minutes): 60

## 2023-11-08 ENCOUNTER — PATIENT MESSAGE (OUTPATIENT)
Dept: DERMATOLOGY | Facility: CLINIC | Age: 58
End: 2023-11-08
Payer: COMMERCIAL

## 2023-11-09 ENCOUNTER — TELEPHONE (OUTPATIENT)
Dept: DERMATOLOGY | Facility: CLINIC | Age: 58
End: 2023-11-09
Payer: COMMERCIAL

## 2023-11-09 NOTE — TELEPHONE ENCOUNTER
----- Message from Edith Castaneda sent at 11/9/2023  3:42 PM CST -----  Regarding: Appt  Contact: Pt [phone]              Current Appt date:   12/18/23     Type of Appt:  Ep     Physician:   Meagan Viveros MD    Reason for rescheduling:  Can not make that date please call to prior to making changes     Caller:   Rhoda     Contact Preference: [phone]

## 2023-11-09 NOTE — TELEPHONE ENCOUNTER
Rescheduled appt for Mohs BCC right lower forehead from 12/18 to 12/14 at 1pm. Confirmed new time and date. New appt reminder sent in the mail

## 2023-11-20 ENCOUNTER — TELEPHONE (OUTPATIENT)
Dept: DERMATOLOGY | Facility: CLINIC | Age: 58
End: 2023-11-20
Payer: COMMERCIAL

## 2023-11-20 NOTE — TELEPHONE ENCOUNTER
----- Message from Lillian ADAN Route sent at 11/20/2023 12:28 PM CST -----  Regarding: Upcoming Procedure  Contact: pt.  [phone]  Pt is calling in ref to upcoming scheduled procedure on 12/14. She says she would like to have it scheduled sooner if possible. Pt is asking to be added to a list if cancellations come up. Patient Requesting Call Back @  [phone]     Quality 110: Preventive Care And Screening: Influenza Immunization: Influenza Immunization Administered during Influenza season Detail Level: Detailed

## 2023-11-20 NOTE — TELEPHONE ENCOUNTER
Informed pt that I will place a star next to her name in our schedule book to keep her in mind should we get a cancellation. Pt verbalized understanding

## 2023-11-21 ENCOUNTER — OFFICE VISIT (OUTPATIENT)
Dept: PSYCHIATRY | Facility: CLINIC | Age: 58
End: 2023-11-21
Payer: COMMERCIAL

## 2023-11-21 DIAGNOSIS — F43.20 ADJUSTMENT DISORDER, UNSPECIFIED TYPE: Primary | ICD-10-CM

## 2023-11-21 PROCEDURE — 90837 PR PSYCHOTHERAPY W/PATIENT, 60 MIN: ICD-10-PCS | Mod: S$GLB,,, | Performed by: SOCIAL WORKER

## 2023-11-21 PROCEDURE — 90837 PSYTX W PT 60 MINUTES: CPT | Mod: S$GLB,,, | Performed by: SOCIAL WORKER

## 2023-11-21 NOTE — PROGRESS NOTES
Individual Psychotherapy (PhD/LCSW)    11/21/2023     Site:  [facility]         Therapeutic Intervention: Met with patient.    Outpatient - Insight oriented psychotherapy 60 min - CPT code 89053, Outpatient - Behavior modifying psychotherapy 60 min - CPT code 84236, Outpatient - Supportive psychotherapy 60 min - CPT Code 00975, and Outpatient - Interactive psychotherapy 60 min - CPT code 69536     Chief complaint/reason for encounter: interpersonal     Interval history and content of current session: Pt is a 58 year-old  white female who presents today for a follow up therapy session. Pt in good spirits. States that  came over yesterday evening and reviewed proposal for financial plan once pt and  are . Pt feeling much better about the future. Reports that she has been networking regarding jobs. Feeling positive about this. Reports children are coming in tonight. They are all going to a [employer] game, including  later this week. Pt working on not expending so much energy on thinking about  and his new gf. Will continue to work on this in future sessions. Pt still unsure what [place] plans will look like. Plans on being communicative with kids about starting to date.     Treatment plan:  Target symptoms: anxiety , interpersonal issues  Why chosen therapy is appropriate versus another modality: relevant to diagnosis, patient responds to this modality, evidence based practice  Outcome monitoring methods: self-report, observation  Therapeutic intervention type: insight oriented psychotherapy, behavior modifying psychotherapy, supportive psychotherapy    Risk parameters:  Patient reports no suicidal ideation  Patient reports no homicidal ideation  Patient reports no self-injurious behavior  Patient reports no violent behavior    Verbal deficits: None    Patient's response to intervention:  The patient's response to intervention is accepting.    Progress toward goals  and other mental status changes:  The patient's progress toward goals is excellent.    Diagnosis:     ICD-10-CM ICD-9-CM   1. Adjustment disorder, unspecified type  F43.20 309.9       Plan:  individual psychotherapy    Return to clinic: 2 weeks    Length of Service (minutes): 60

## 2023-12-05 ENCOUNTER — OFFICE VISIT (OUTPATIENT)
Dept: PSYCHIATRY | Facility: CLINIC | Age: 58
End: 2023-12-05
Payer: COMMERCIAL

## 2023-12-05 DIAGNOSIS — F43.20 ADJUSTMENT DISORDER, UNSPECIFIED TYPE: Primary | ICD-10-CM

## 2023-12-05 PROCEDURE — 90837 PSYTX W PT 60 MINUTES: CPT | Mod: S$GLB,,, | Performed by: SOCIAL WORKER

## 2023-12-05 PROCEDURE — 90837 PR PSYCHOTHERAPY W/PATIENT, 60 MIN: ICD-10-PCS | Mod: S$GLB,,, | Performed by: SOCIAL WORKER

## 2023-12-05 NOTE — PROGRESS NOTES
Individual Psychotherapy (PhD/LCSW)    12/5/2023     Site:  [facility]         Therapeutic Intervention: Met with patient.    Outpatient - Insight oriented psychotherapy 60 min - CPT code 67101, Outpatient - Behavior modifying psychotherapy 60 min - CPT code 84139, Outpatient - Supportive psychotherapy 60 min - CPT Code 04503, and Outpatient - Interactive psychotherapy 60 min - CPT code 58005     Chief complaint/reason for encounter: interpersonal     Interval history and content of current session: Pt is a 58 year-old  white female who presents today for a follow up therapy session. Pt in good spirits. Still working on financial agreement.  filed for divorce without confirming with pt that he gone through with this. Pt frustrated with him however they are back to communicating amicably. Pt reports that Thanksgiving went smoothly. Looking forward to visiting [place] for a few days to visit sister. Feeling both stressed but hopeful.     Treatment plan:  Target symptoms: anxiety , interpersonal issues  Why chosen therapy is appropriate versus another modality: relevant to diagnosis, patient responds to this modality, evidence based practice  Outcome monitoring methods: self-report, observation  Therapeutic intervention type: insight oriented psychotherapy, behavior modifying psychotherapy, supportive psychotherapy    Risk parameters:  Patient reports no suicidal ideation  Patient reports no homicidal ideation  Patient reports no self-injurious behavior  Patient reports no violent behavior    Verbal deficits: None    Patient's response to intervention:  The patient's response to intervention is accepting.    Progress toward goals and other mental status changes:  The patient's progress toward goals is excellent.    Diagnosis:     ICD-10-CM ICD-9-CM   1. Adjustment disorder, unspecified type  F43.20 309.9       Plan:  individual psychotherapy    Return to clinic: 2 weeks    Length of Service  (minutes): 60

## 2023-12-12 ENCOUNTER — PATIENT MESSAGE (OUTPATIENT)
Dept: INTERNAL MEDICINE | Facility: CLINIC | Age: 58
End: 2023-12-12

## 2023-12-12 ENCOUNTER — OFFICE VISIT (OUTPATIENT)
Dept: INTERNAL MEDICINE | Facility: CLINIC | Age: 58
End: 2023-12-12
Payer: COMMERCIAL

## 2023-12-12 VITALS
HEART RATE: 61 BPM | WEIGHT: 119.06 LBS | HEIGHT: 64 IN | DIASTOLIC BLOOD PRESSURE: 76 MMHG | OXYGEN SATURATION: 100 % | SYSTOLIC BLOOD PRESSURE: 120 MMHG | BODY MASS INDEX: 20.32 KG/M2

## 2023-12-12 DIAGNOSIS — I10 ESSENTIAL HYPERTENSION: ICD-10-CM

## 2023-12-12 DIAGNOSIS — Z00.00 ROUTINE GENERAL MEDICAL EXAMINATION AT A HEALTH CARE FACILITY: Primary | ICD-10-CM

## 2023-12-12 PROCEDURE — 99396 PR PREVENTIVE VISIT,EST,40-64: ICD-10-PCS | Mod: S$GLB,,, | Performed by: INTERNAL MEDICINE

## 2023-12-12 PROCEDURE — 1159F MED LIST DOCD IN RCRD: CPT | Mod: CPTII,S$GLB,, | Performed by: INTERNAL MEDICINE

## 2023-12-12 PROCEDURE — 99999 PR PBB SHADOW E&M-EST. PATIENT-LVL III: CPT | Mod: PBBFAC,,, | Performed by: INTERNAL MEDICINE

## 2023-12-12 PROCEDURE — 99396 PREV VISIT EST AGE 40-64: CPT | Mod: S$GLB,,, | Performed by: INTERNAL MEDICINE

## 2023-12-12 PROCEDURE — 3008F PR BODY MASS INDEX (BMI) DOCUMENTED: ICD-10-PCS | Mod: CPTII,S$GLB,, | Performed by: INTERNAL MEDICINE

## 2023-12-12 PROCEDURE — 1160F RVW MEDS BY RX/DR IN RCRD: CPT | Mod: CPTII,S$GLB,, | Performed by: INTERNAL MEDICINE

## 2023-12-12 PROCEDURE — 1159F PR MEDICATION LIST DOCUMENTED IN MEDICAL RECORD: ICD-10-PCS | Mod: CPTII,S$GLB,, | Performed by: INTERNAL MEDICINE

## 2023-12-12 PROCEDURE — 99999 PR PBB SHADOW E&M-EST. PATIENT-LVL III: ICD-10-PCS | Mod: PBBFAC,,, | Performed by: INTERNAL MEDICINE

## 2023-12-12 PROCEDURE — 3078F DIAST BP <80 MM HG: CPT | Mod: CPTII,S$GLB,, | Performed by: INTERNAL MEDICINE

## 2023-12-12 PROCEDURE — 3074F PR MOST RECENT SYSTOLIC BLOOD PRESSURE < 130 MM HG: ICD-10-PCS | Mod: CPTII,S$GLB,, | Performed by: INTERNAL MEDICINE

## 2023-12-12 PROCEDURE — 3074F SYST BP LT 130 MM HG: CPT | Mod: CPTII,S$GLB,, | Performed by: INTERNAL MEDICINE

## 2023-12-12 PROCEDURE — 3078F PR MOST RECENT DIASTOLIC BLOOD PRESSURE < 80 MM HG: ICD-10-PCS | Mod: CPTII,S$GLB,, | Performed by: INTERNAL MEDICINE

## 2023-12-12 PROCEDURE — 1160F PR REVIEW ALL MEDS BY PRESCRIBER/CLIN PHARMACIST DOCUMENTED: ICD-10-PCS | Mod: CPTII,S$GLB,, | Performed by: INTERNAL MEDICINE

## 2023-12-12 PROCEDURE — 3008F BODY MASS INDEX DOCD: CPT | Mod: CPTII,S$GLB,, | Performed by: INTERNAL MEDICINE

## 2023-12-12 NOTE — PROGRESS NOTES
Subjective:       Patient ID: Rhoda Daly is a 58 y.o. female.    Chief Complaint: Annual Exam    Here for annual exam    Will be having basal cell removed R forehead 2 days.    No other new health issues.        Review of Systems   Constitutional:  Negative for activity change, appetite change and unexpected weight change.   Eyes:  Negative for visual disturbance.   Respiratory:  Negative for cough, chest tightness and shortness of breath.    Cardiovascular:  Negative for chest pain.   Gastrointestinal:  Negative for abdominal distention and abdominal pain.   Genitourinary:  Negative for difficulty urinating and urgency.        No breast lumps/masses/pain/nipple d/c in either breast     Skin:  Negative for rash.           Objective:      Physical Exam  Vitals reviewed.   Constitutional:       General: She is not in acute distress.     Appearance: Normal appearance. She is well-developed. She is not ill-appearing, toxic-appearing or diaphoretic.   HENT:      Head: Normocephalic and atraumatic.   Eyes:      General: No scleral icterus.     Pupils: Pupils are equal, round, and reactive to light.   Neck:      Thyroid: No thyromegaly.   Cardiovascular:      Rate and Rhythm: Normal rate and regular rhythm.      Heart sounds: Normal heart sounds. No murmur heard.     No friction rub. No gallop.   Pulmonary:      Effort: Pulmonary effort is normal. No respiratory distress.      Breath sounds: Normal breath sounds. No wheezing or rales.   Abdominal:      General: Bowel sounds are normal. There is no distension.      Palpations: Abdomen is soft. There is no mass.      Tenderness: There is no abdominal tenderness. There is no guarding or rebound.   Musculoskeletal:         General: No tenderness. Normal range of motion.      Cervical back: Normal range of motion.   Lymphadenopathy:      Cervical: No cervical adenopathy.   Neurological:      General: No focal deficit present.      Mental Status: She is alert and oriented  to person, place, and time.   Psychiatric:         Mood and Affect: Mood normal.         Speech: Speech normal.         Behavior: Behavior normal.         Assessment:       1. Routine general medical examination at a health care facility    2. Essential hypertension        Plan:       Rhoda was seen today for annual exam.    Diagnoses and all orders for this visit:    Routine general medical examination at a health care facility    Essential hypertension  I reviewed recent blood pressures results with the patient. OK to continue without a med        Health Maintenance         Date Due Completion Date    HIV Screening Never done ---    COVID-19 Vaccine (7 - 2023-24 season) 12/04/2023 10/9/2023    Mammogram 01/30/2024 1/30/2023    TETANUS VACCINE 02/08/2026 2/8/2016    Lipid Panel 12/06/2027 12/6/2022    Cervical Cancer Screening 05/30/2028 5/30/2023    Colorectal Cancer Screening 03/04/2032 3/4/2022            Follow up in about 1 year (around 12/12/2024).    Future Appointments   Date Time Provider Department Center   12/18/2023  8:10 AM LAB, APPOINTMENT [facility] INTMED [facility] IM [facility]   12/19/2023  8:00 AM Kika Boyce, LCSW [facility] SOCL [facility]   12/21/2023  8:10 AM Meagan Viveros MD [facility] DERMSUR [facility]   1/4/2024 10:00 AM Kika Boyce, LCSW [facility] SOCL [facility]   1/9/2024  8:00 AM Mark Gonzalez OD [facility] OPTOMTY [facility]   1/16/2024  8:00 AM Kika Boyce, LCSW [facility] SOCL [facility]   1/30/2024  8:00 AM Kika Boyce, LCSW [facility] SOCL [facility]   2/1/2024  8:30 AM [facility] MAMMO1 [facility] MAMMO [facility]   2/14/2024  2:00 PM Kika Boyce, LCSW [facility]CL [facility]   2/27/2024  8:00 AM Kika Boyce, LCSW [facility]CL [facility]   3/12/2024  8:00 AM Kika Boyce, SALVATORE [facility] [address]   3/26/2024  8:00 AM Kika Boyce, hospitalsNEHAL [facility] HAM [address]

## 2023-12-14 ENCOUNTER — PROCEDURE VISIT (OUTPATIENT)
Dept: DERMATOLOGY | Facility: CLINIC | Age: 58
End: 2023-12-14
Payer: COMMERCIAL

## 2023-12-14 ENCOUNTER — PATIENT MESSAGE (OUTPATIENT)
Dept: ADMINISTRATIVE | Facility: HOSPITAL | Age: 58
End: 2023-12-14
Payer: COMMERCIAL

## 2023-12-14 ENCOUNTER — PATIENT MESSAGE (OUTPATIENT)
Dept: INTERNAL MEDICINE | Facility: CLINIC | Age: 58
End: 2023-12-14
Payer: COMMERCIAL

## 2023-12-14 VITALS
HEIGHT: 64 IN | WEIGHT: 119.06 LBS | BODY MASS INDEX: 20.32 KG/M2 | DIASTOLIC BLOOD PRESSURE: 75 MMHG | SYSTOLIC BLOOD PRESSURE: 161 MMHG | HEART RATE: 58 BPM

## 2023-12-14 DIAGNOSIS — Z00.00 ROUTINE GENERAL MEDICAL EXAMINATION AT A HEALTH CARE FACILITY: Primary | ICD-10-CM

## 2023-12-14 DIAGNOSIS — C44.319 BASAL CELL CARCINOMA OF RIGHT FOREHEAD: Primary | ICD-10-CM

## 2023-12-14 PROCEDURE — 17312 MOHS ADDL STAGE: CPT | Mod: S$GLB,,, | Performed by: DERMATOLOGY

## 2023-12-14 PROCEDURE — 99499 UNLISTED E&M SERVICE: CPT | Mod: S$GLB,,, | Performed by: DERMATOLOGY

## 2023-12-14 PROCEDURE — 17311: ICD-10-PCS | Mod: 51,S$GLB,, | Performed by: DERMATOLOGY

## 2023-12-14 PROCEDURE — 17312: ICD-10-PCS | Mod: S$GLB,,, | Performed by: DERMATOLOGY

## 2023-12-14 PROCEDURE — 13131 PR RECMPL WND HEAD,FAC,HAND 1.1-2.5 CM: ICD-10-PCS | Mod: S$GLB,,, | Performed by: DERMATOLOGY

## 2023-12-14 PROCEDURE — 17311 MOHS 1 STAGE H/N/HF/G: CPT | Mod: 51,S$GLB,, | Performed by: DERMATOLOGY

## 2023-12-14 PROCEDURE — 13131 CMPLX RPR F/C/C/M/N/AX/G/H/F: CPT | Mod: S$GLB,,, | Performed by: DERMATOLOGY

## 2023-12-14 PROCEDURE — 99499 NO LOS: ICD-10-PCS | Mod: S$GLB,,, | Performed by: DERMATOLOGY

## 2023-12-14 NOTE — TELEPHONE ENCOUNTER
Hi, please contact the patient to assist in scheduling    Orders Placed This Encounter    Comprehensive Metabolic Panel    Lipid Panel       Thank you, Manoj Stein

## 2023-12-14 NOTE — PROGRESS NOTES
PROCEDURE: Mohs' Micrographic Surgery    INDICATION: Location in mask areas of face including central face, nose, eyelids, eyebrows, lips, chin, preauricular, temple, and ear. Biopsy-proven skin cancer of cosmetically and functionally important areas, including head, neck, genital, hand, foot, or areas known for having difficulty in healing, such as the lower anterior legs. Tumor with ill-defined borders.    REFERRING PROVIDER: Verónica White M.D.    CASE NUMBER:     ANESTHETIC: 3 cc 0.5% Lidocaine with Epi 1:200,000 mixed 1:1 with 0.5% Bupivacaine    SURGICAL PREP: Hibiclens    SURGEON: Meagan Viveros MD    ASSISTANTS: Maria E Valencia PA-C, Samantha De Luna MA, and Tiffany Velasquez, Surg Tech    PREOPERATIVE DIAGNOSIS: basal cell carcinoma- nodular    POSTOPERATIVE DIAGNOSIS: basal cell carcinoma    PATHOLOGIC DIAGNOSIS: basal cell carcinoma- nodular    HISTOLOGY OF SPECIMENS IN FIRST STAGE:   Tumor Type: Tumor seen. Nodular basal cell carcinoma: Nodular tumor in dermis composed of basaloid cells exhibiting peripheral palisading and retraction artifact.   Depth of Invasion: epidermis and dermis  Perineural Invasion: No    HISTOLOGY OF SPECIMENS IN SUBSEQUENT STAGES:  Tumor Type:  No tumor seen.    STAGES OF MOHS' SURGERY PERFORMED: 2    TUMOR-FREE PLANE ACHIEVED: Yes    HEMOSTASIS: electrocoagulation     SPECIMENS: 3 (2 in stage A and 1 in stage B)    LOCATION: right lower forehead. Location verified with Dr. White's clinical photograph. Patient also verified location with hand held mirror.    INITIAL LESION SIZE: 0.4 x 0.4 cm    FINAL DEFECT SIZE: 0.7 x 0.8 cm    WOUND REPAIR/DISPOSITION: The patient tolerated Mohs' Micrographic Surgery for a basal cell carcinoma very well. When the tumor was completely removed, a repair of the surgical defect was undertaken.    PROCEDURE: Complex Linear Repair    INDICATION: Status post Mohs' Micrographic Surgery for basal cell carcinoma.    CASE NUMBER:     SURGEON: Meagan  "MIKE Viveros MD    ASSISTANTS: Maria E Valencia PA-C and Abelardo Vázquez    ANESTHETIC: 1 cc 1% Lidocaine with Epinephrine 1:100,000    SURGICAL PREP: Hibiclens, prepped by Abelardo Vázquez    LOCATION: right lower forehead    DEFECT SIZE: 0.7 x 0.8 cm    WOUND REPAIR/DISPOSITION:  After the patient's carcinoma had been completely removed with Mohs' Micrographic Surgery, a repair of the surgical defect was undertaken. The patient was returned to the operating suite where the area of right lower forehead was prepped, draped, and anesthetized in the usual sterile fashion. The wound was widely undermined in all directions. The wound was undermined to a distance at least the maximum width of the defect as measured perpendicular to the closure line along at least one entire edge of the defect, in this case 1 cm. Then, electrocoagulation was used to obtain meticulous hemostasis. 5-0 Vicryl buried vertical mattress sutures were placed into the subcutaneous and dermal plane to close the wound and apple the cutaneous wound edge. Bilateral dog ears were identified and were removed by a standard Burow's triangle technique. The cutaneous wound edges were closed using interrupted 6-0 Prolene suture.    The patient tolerated the procedure well.    The area was cleaned and dressed appropriately and the patient was given wound care instructions, as well as appointment for follow-up evaluation and suture removal in 7 days.    LENGTH OF REPAIR: 2.3 cm    Vitals:    12/14/23 1255 12/14/23 1552   BP: 129/83 (!) 161/75   BP Location: Right forearm Left arm   Patient Position: Sitting Sitting   BP Method: Medium (Automatic) Medium (Automatic)   Pulse: 66 (!) 58   Weight: 54 kg (119 lb 0.8 oz)    Height: 5' 4" (1.626 m)          "

## 2023-12-18 ENCOUNTER — LAB VISIT (OUTPATIENT)
Dept: LAB | Facility: HOSPITAL | Age: 58
End: 2023-12-18
Attending: INTERNAL MEDICINE
Payer: COMMERCIAL

## 2023-12-18 DIAGNOSIS — Z00.00 ROUTINE GENERAL MEDICAL EXAMINATION AT A HEALTH CARE FACILITY: ICD-10-CM

## 2023-12-18 LAB
ALBUMIN SERPL BCP-MCNC: 4.1 G/DL (ref 3.5–5.2)
ALP SERPL-CCNC: 96 U/L (ref 55–135)
ALT SERPL W/O P-5'-P-CCNC: 20 U/L (ref 10–44)
ANION GAP SERPL CALC-SCNC: 4 MMOL/L (ref 8–16)
AST SERPL-CCNC: 26 U/L (ref 10–40)
BILIRUB SERPL-MCNC: 0.5 MG/DL (ref 0.1–1)
BUN SERPL-MCNC: 21 MG/DL (ref 6–20)
CALCIUM SERPL-MCNC: 9.8 MG/DL (ref 8.7–10.5)
CHLORIDE SERPL-SCNC: 107 MMOL/L (ref 95–110)
CHOLEST SERPL-MCNC: 227 MG/DL (ref 120–199)
CHOLEST/HDLC SERPL: 2.8 {RATIO} (ref 2–5)
CO2 SERPL-SCNC: 30 MMOL/L (ref 23–29)
CREAT SERPL-MCNC: 0.9 MG/DL (ref 0.5–1.4)
EST. GFR  (NO RACE VARIABLE): >60 ML/MIN/1.73 M^2
GLUCOSE SERPL-MCNC: 98 MG/DL (ref 70–110)
HDLC SERPL-MCNC: 81 MG/DL (ref 40–75)
HDLC SERPL: 35.7 % (ref 20–50)
LDLC SERPL CALC-MCNC: 131.2 MG/DL (ref 63–159)
NONHDLC SERPL-MCNC: 146 MG/DL
POTASSIUM SERPL-SCNC: 5.1 MMOL/L (ref 3.5–5.1)
PROT SERPL-MCNC: 7 G/DL (ref 6–8.4)
SODIUM SERPL-SCNC: 141 MMOL/L (ref 136–145)
TRIGL SERPL-MCNC: 74 MG/DL (ref 30–150)

## 2023-12-18 PROCEDURE — 80053 COMPREHEN METABOLIC PANEL: CPT | Performed by: INTERNAL MEDICINE

## 2023-12-18 PROCEDURE — 80061 LIPID PANEL: CPT | Performed by: INTERNAL MEDICINE

## 2023-12-18 PROCEDURE — 36415 COLL VENOUS BLD VENIPUNCTURE: CPT | Performed by: INTERNAL MEDICINE

## 2023-12-19 ENCOUNTER — OFFICE VISIT (OUTPATIENT)
Dept: PSYCHIATRY | Facility: CLINIC | Age: 58
End: 2023-12-19
Payer: COMMERCIAL

## 2023-12-19 DIAGNOSIS — F43.20 ADJUSTMENT DISORDER, UNSPECIFIED TYPE: Primary | ICD-10-CM

## 2023-12-19 PROCEDURE — 90837 PSYTX W PT 60 MINUTES: CPT | Mod: S$GLB,,, | Performed by: SOCIAL WORKER

## 2023-12-19 PROCEDURE — 90837 PR PSYCHOTHERAPY W/PATIENT, 60 MIN: ICD-10-PCS | Mod: S$GLB,,, | Performed by: SOCIAL WORKER

## 2023-12-19 NOTE — PROGRESS NOTES
Individual Psychotherapy (PhD/LCSW)    12/19/2023     Site:  [facility]         Therapeutic Intervention: Met with patient.    Outpatient - Insight oriented psychotherapy 60 min - CPT code 64624, Outpatient - Behavior modifying psychotherapy 60 min - CPT code 15935, Outpatient - Supportive psychotherapy 60 min - CPT Code 15392, and Outpatient - Interactive psychotherapy 60 min - CPT code 86317     Chief complaint/reason for encounter: interpersonal     Interval history and content of current session: Pt is a 58 year-old  white female who presents today for a follow up therapy session. Pt feeling more depressed today.  still dragging feet on the financial agreement. Pt reports feeling alone knowing  with his new girlfriend. Discussed strategies for reaching out to friends and making plans. Pt acknowledges that her plans to visit sister will be help improve her mood. Pt setting better boundaries around self-care. Not feeling the urge to make plans for the entire family.       Treatment plan:  Target symptoms: anxiety , interpersonal issues  Why chosen therapy is appropriate versus another modality: relevant to diagnosis, patient responds to this modality, evidence based practice  Outcome monitoring methods: self-report, observation  Therapeutic intervention type: insight oriented psychotherapy, behavior modifying psychotherapy, supportive psychotherapy    Risk parameters:  Patient reports no suicidal ideation  Patient reports no homicidal ideation  Patient reports no self-injurious behavior  Patient reports no violent behavior    Verbal deficits: None    Patient's response to intervention:  The patient's response to intervention is accepting.    Progress toward goals and other mental status changes:  The patient's progress toward goals is excellent.    Diagnosis:     ICD-10-CM ICD-9-CM   1. Adjustment disorder, unspecified type  F43.20 309.9       Plan:  individual psychotherapy    Return  to clinic: 2 weeks    Length of Service (minutes): 60

## 2023-12-20 ENCOUNTER — PATIENT MESSAGE (OUTPATIENT)
Dept: INTERNAL MEDICINE | Facility: CLINIC | Age: 58
End: 2023-12-20
Payer: COMMERCIAL

## 2023-12-21 ENCOUNTER — OFFICE VISIT (OUTPATIENT)
Dept: DERMATOLOGY | Facility: CLINIC | Age: 58
End: 2023-12-21
Payer: COMMERCIAL

## 2023-12-21 DIAGNOSIS — Z09 POSTOP CHECK: Primary | ICD-10-CM

## 2023-12-21 PROCEDURE — 99999 PR PBB SHADOW E&M-EST. PATIENT-LVL II: CPT | Mod: PBBFAC,,, | Performed by: DERMATOLOGY

## 2023-12-21 PROCEDURE — 1159F MED LIST DOCD IN RCRD: CPT | Mod: CPTII,S$GLB,, | Performed by: DERMATOLOGY

## 2023-12-21 PROCEDURE — 99999 PR PBB SHADOW E&M-EST. PATIENT-LVL II: ICD-10-PCS | Mod: PBBFAC,,, | Performed by: DERMATOLOGY

## 2023-12-21 PROCEDURE — 99024 POSTOP FOLLOW-UP VISIT: CPT | Mod: S$GLB,,, | Performed by: DERMATOLOGY

## 2023-12-21 PROCEDURE — 99024 PR POST-OP FOLLOW-UP VISIT: ICD-10-PCS | Mod: S$GLB,,, | Performed by: DERMATOLOGY

## 2023-12-21 PROCEDURE — 1159F PR MEDICATION LIST DOCUMENTED IN MEDICAL RECORD: ICD-10-PCS | Mod: CPTII,S$GLB,, | Performed by: DERMATOLOGY

## 2023-12-21 NOTE — PROGRESS NOTES
58 y.o. female patient is here for suture removal following Mohs' surgery.    Patient reports no problems.    WOUND PE:  The right lower forehead sutures intact. Wound healing well. Good skin edges. No signs or symptoms of infection.      IMPRESSION:  Healing operative site from Mohs' surgery BCC right lower forehead s/p Mohs with CLC, postop day #7.    PLAN:  Sutures removed today by  Shari Menjivar RN . Steri-strips applied.  Continue wound care.  Keep moist with Aquaphor, or may use silicone gel.   Call if any issues arise.     RTC:  Send photo via patient portal in 1 month, or come in person if needed.

## 2024-01-01 ENCOUNTER — PATIENT MESSAGE (OUTPATIENT)
Dept: DERMATOLOGY | Facility: CLINIC | Age: 59
End: 2024-01-01
Payer: COMMERCIAL

## 2024-01-04 ENCOUNTER — OFFICE VISIT (OUTPATIENT)
Dept: PSYCHIATRY | Facility: CLINIC | Age: 59
End: 2024-01-04
Payer: COMMERCIAL

## 2024-01-04 DIAGNOSIS — F43.20 ADJUSTMENT DISORDER, UNSPECIFIED TYPE: Primary | ICD-10-CM

## 2024-01-04 PROCEDURE — 90837 PSYTX W PT 60 MINUTES: CPT | Mod: S$GLB,,, | Performed by: SOCIAL WORKER

## 2024-01-04 NOTE — PROGRESS NOTES
Individual Psychotherapy (PhD/LCSW)    1/4/2024     Site:  [facility]         Therapeutic Intervention: Met with patient.    Outpatient - Insight oriented psychotherapy 60 min - CPT code 23577, Outpatient - Behavior modifying psychotherapy 60 min - CPT code 93009, Outpatient - Supportive psychotherapy 60 min - CPT Code 01806, and Outpatient - Interactive psychotherapy 60 min - CPT code 89858     Chief complaint/reason for encounter: interpersonal     Interval history and content of current session: Pt is a 58 year-old  white female who presents today for a follow up therapy session. Pt reports that divorce will be signed Jan 5th. Feeling depressed about this but also made plans with friends to go out to dinner. Will likely book a massage. Notified the children who seemed to respond in a supportive way. Still hashing out finances with  which has been very complicated and emotional. Overall, pt states the holidays went well. Was nice to visit sister in [place].       Treatment plan:  Target symptoms: anxiety , interpersonal issues  Why chosen therapy is appropriate versus another modality: relevant to diagnosis, patient responds to this modality, evidence based practice  Outcome monitoring methods: self-report, observation  Therapeutic intervention type: insight oriented psychotherapy, behavior modifying psychotherapy, supportive psychotherapy    Risk parameters:  Patient reports no suicidal ideation  Patient reports no homicidal ideation  Patient reports no self-injurious behavior  Patient reports no violent behavior    Verbal deficits: None    Patient's response to intervention:  The patient's response to intervention is accepting.    Progress toward goals and other mental status changes:  The patient's progress toward goals is excellent.    Diagnosis:     ICD-10-CM ICD-9-CM   1. Adjustment disorder, unspecified type  F43.20 309.9       Plan:  individual psychotherapy    Return to clinic: 2  weeks    Length of Service (minutes): 60

## 2024-01-09 ENCOUNTER — OFFICE VISIT (OUTPATIENT)
Dept: OPTOMETRY | Facility: CLINIC | Age: 59
End: 2024-01-09
Payer: COMMERCIAL

## 2024-01-09 DIAGNOSIS — H52.03 HYPEROPIA WITH PRESBYOPIA OF BOTH EYES: ICD-10-CM

## 2024-01-09 DIAGNOSIS — H52.4 HYPEROPIA WITH PRESBYOPIA OF BOTH EYES: ICD-10-CM

## 2024-01-09 DIAGNOSIS — H25.13 NUCLEAR SCLEROSIS, BILATERAL: Primary | ICD-10-CM

## 2024-01-09 PROCEDURE — 92015 DETERMINE REFRACTIVE STATE: CPT | Mod: S$GLB,,, | Performed by: OPTOMETRIST

## 2024-01-09 PROCEDURE — 99999 PR PBB SHADOW E&M-EST. PATIENT-LVL II: CPT | Mod: PBBFAC,,, | Performed by: OPTOMETRIST

## 2024-01-09 PROCEDURE — 1159F MED LIST DOCD IN RCRD: CPT | Mod: CPTII,S$GLB,, | Performed by: OPTOMETRIST

## 2024-01-09 PROCEDURE — 92014 COMPRE OPH EXAM EST PT 1/>: CPT | Mod: S$GLB,,, | Performed by: OPTOMETRIST

## 2024-01-09 NOTE — PROGRESS NOTES
HPI    Annual Exam and contact lens fit.   Gets regular burst blood vessels can happen to either. Denies ASA or Blood   thinner  Pt states vision is stable   Cls 14+ hrs no complaints.     Pt denies F/F   Pt denies Dry/ Itchy/ Burning  Gtt: No    Annual CL EXAM   Brand: ERICA MAX Multi   Replacement: 1 day  Sleeps in lenses: No  Comfort problems: Some days vision is better than others   Solution: N/A    Last edited by Makr Gonzalez, OD on 1/9/2024  8:15 AM.            Assessment /Plan     For exam results, see Encounter Report.    Nuclear sclerosis, bilateral  -Educated patient on presence of cataracts at today's exam, monitor at annual dilated fundus exam. 8+ years surgical estimate.    Hyperopia with presbyopia of both eyes  Eyeglass Final Rx       Eyeglass Final Rx         Sphere Cylinder Dist VA Add    Right +1.25 Sphere 20/20 +2.25    Left +1.25 Sphere 20/20 +2.25      Type: PAL    Expiration Date: 1/9/2025                  Contact Lens Final Rx       Final Contact Lens Rx         Brand Base Curve Diameter Sphere Cylinder Add    Right AV 1 day MAX Multifocal 8.4 14.3 +1.25 Sphere med    Left AV 1 day MAX Multifocal 8.4 14.3 +1.25 Sphere med      Expiration Date: 1/9/2025    Replacement: Daily    Wearing Schedule: Daily Wear                  Unchanged, Extended Current CLRx N/C Fit today        RTC 1 yr

## 2024-01-12 ENCOUNTER — PATIENT MESSAGE (OUTPATIENT)
Dept: PSYCHIATRY | Facility: CLINIC | Age: 59
End: 2024-01-12

## 2024-01-17 ENCOUNTER — PATIENT MESSAGE (OUTPATIENT)
Dept: INTERNAL MEDICINE | Facility: CLINIC | Age: 59
End: 2024-01-17

## 2024-01-17 DIAGNOSIS — I10 ESSENTIAL HYPERTENSION: ICD-10-CM

## 2024-01-18 ENCOUNTER — OFFICE VISIT (OUTPATIENT)
Dept: PSYCHIATRY | Facility: CLINIC | Age: 59
End: 2024-01-18
Payer: COMMERCIAL

## 2024-01-18 DIAGNOSIS — F43.20 ADJUSTMENT DISORDER, UNSPECIFIED TYPE: Primary | ICD-10-CM

## 2024-01-18 PROCEDURE — 90837 PSYTX W PT 60 MINUTES: CPT | Mod: S$GLB,,, | Performed by: SOCIAL WORKER

## 2024-01-18 NOTE — PROGRESS NOTES
"  Individual Psychotherapy (PhD/LCSW)    1/18/2024     Site:  [facility]         Therapeutic Intervention: Met with patient.    Outpatient - Insight oriented psychotherapy 60 min - CPT code 81155, Outpatient - Behavior modifying psychotherapy 60 min - CPT code 05087, Outpatient - Supportive psychotherapy 60 min - CPT Code 80413, and Outpatient - Interactive psychotherapy 60 min - CPT code 09842     Chief complaint/reason for encounter: interpersonal     Interval history and content of current session: Pt is a 58 year-old  white female who presents today for a follow up therapy session. Pt reports having a bad few weeks. States the divorce was finalized. Pt reports feeling sad and angry about the situation as ex still hasn't signed an agreement related to financial security. Pt feeling depressed about this. "I realize I have nothing." Pt worried about [facility] and what that might look like. Pt spent majority of the time processing anger toward ex and sadness that she cannot move on at this time. No interest in dating. Still working on possible job opportunities which pt feels hopeful about. More isolated but denies difficult functioning. Hasn't been as communicative with friends. Is walking a lot and eating well.     Treatment plan:  Target symptoms: anxiety , interpersonal issues  Why chosen therapy is appropriate versus another modality: relevant to diagnosis, patient responds to this modality, evidence based practice  Outcome monitoring methods: self-report, observation  Therapeutic intervention type: insight oriented psychotherapy, behavior modifying psychotherapy, supportive psychotherapy    Risk parameters:  Patient reports no suicidal ideation  Patient reports no homicidal ideation  Patient reports no self-injurious behavior  Patient reports no violent behavior    Verbal deficits: None    Patient's response to intervention:  The patient's response to intervention is accepting.    Progress " toward goals and other mental status changes:  The patient's progress toward goals is excellent.    Diagnosis:     ICD-10-CM ICD-9-CM   1. Adjustment disorder, unspecified type  F43.20 309.9       Plan:  individual psychotherapy    Return to clinic: 2 weeks    Length of Service (minutes): 60

## 2024-01-19 RX ORDER — VALSARTAN 40 MG/1
40 TABLET ORAL DAILY
Qty: 90 TABLET | Refills: 2 | Status: SHIPPED | OUTPATIENT
Start: 2024-01-19 | End: 2024-04-15 | Stop reason: SDUPTHER

## 2024-01-24 ENCOUNTER — PATIENT MESSAGE (OUTPATIENT)
Dept: OPTOMETRY | Facility: CLINIC | Age: 59
End: 2024-01-24

## 2024-01-30 ENCOUNTER — OFFICE VISIT (OUTPATIENT)
Dept: PSYCHIATRY | Facility: CLINIC | Age: 59
End: 2024-01-30
Payer: COMMERCIAL

## 2024-01-30 DIAGNOSIS — F43.20 ADJUSTMENT DISORDER, UNSPECIFIED TYPE: Primary | ICD-10-CM

## 2024-01-30 PROCEDURE — 4010F ACE/ARB THERAPY RXD/TAKEN: CPT | Mod: CPTII,S$GLB,, | Performed by: SOCIAL WORKER

## 2024-01-30 PROCEDURE — 90837 PSYTX W PT 60 MINUTES: CPT | Mod: S$GLB,,, | Performed by: SOCIAL WORKER

## 2024-01-30 NOTE — PROGRESS NOTES
Individual Psychotherapy (PhD/LCSW)    1/30/2024     Site:  [facility]         Therapeutic Intervention: Met with patient.    Outpatient - Insight oriented psychotherapy 60 min - CPT code 33735, Outpatient - Behavior modifying psychotherapy 60 min - CPT code 61259, Outpatient - Supportive psychotherapy 60 min - CPT Code 82271, and Outpatient - Interactive psychotherapy 60 min - CPT code 72942     Chief complaint/reason for encounter: interpersonal     Interval history and content of current session: Pt is a 58 year-old  white female who presents today for a follow up therapy session. Pt still remains angry with ex- for postponing signing any type of written financial agreement, however appears more accepting that this is how he operates. Pt has decided to open herself up to dating. Feels invalidated by a few friends this past weekend who had trouble understanding pt's emotional position. States that she is nervous about Kd as she will see ex- with girlfriend together for the first time. Daughter is being presented as last year's queen. Pt and clinician discussed pt's borrowing of worry and how it is expending energy. Pt recognizes that answers will come about financial status, and that at that time, she can start to problem solve and figure things out. Pt open to talking to a financial counselor about how to manage her money so that she has custodial. Reviewed pt's thoughts and feelings, utilizing CBT to address worst and best case scenarios. Overall, pt stable. Processing in a healthy way and acknowledging the painful/natural feelings.     Treatment plan:  Target symptoms: anxiety , interpersonal issues  Why chosen therapy is appropriate versus another modality: relevant to diagnosis, patient responds to this modality, evidence based practice  Outcome monitoring methods: self-report, observation  Therapeutic intervention type: insight oriented psychotherapy, behavior modifying  psychotherapy, supportive psychotherapy    Risk parameters:  Patient reports no suicidal ideation  Patient reports no homicidal ideation  Patient reports no self-injurious behavior  Patient reports no violent behavior    Verbal deficits: None    Patient's response to intervention:  The patient's response to intervention is accepting.    Progress toward goals and other mental status changes:  The patient's progress toward goals is excellent.    Diagnosis:     ICD-10-CM ICD-9-CM   1. Adjustment disorder, unspecified type  F43.20 309.9       Plan:  individual psychotherapy    Return to clinic: 2 weeks    Length of Service (minutes): 60

## 2024-02-01 ENCOUNTER — HOSPITAL ENCOUNTER (OUTPATIENT)
Dept: RADIOLOGY | Facility: OTHER | Age: 59
Discharge: HOME | End: 2024-02-01
Attending: STUDENT IN AN ORGANIZED HEALTH CARE EDUCATION/TRAINING PROGRAM
Payer: COMMERCIAL

## 2024-02-01 DIAGNOSIS — Z12.31 BREAST CANCER SCREENING BY MAMMOGRAM: ICD-10-CM

## 2024-02-01 DIAGNOSIS — Z01.419 WELL WOMAN EXAM WITH ROUTINE GYNECOLOGICAL EXAM: ICD-10-CM

## 2024-02-01 PROCEDURE — 77067 SCR MAMMO BI INCL CAD: CPT | Mod: TC

## 2024-02-01 PROCEDURE — 77067 SCR MAMMO BI INCL CAD: CPT | Mod: 26,,, | Performed by: RADIOLOGY

## 2024-02-01 PROCEDURE — 77063 BREAST TOMOSYNTHESIS BI: CPT | Mod: 26,,, | Performed by: RADIOLOGY

## 2024-02-14 ENCOUNTER — OFFICE VISIT (OUTPATIENT)
Dept: PSYCHIATRY | Facility: CLINIC | Age: 59
End: 2024-02-14
Payer: COMMERCIAL

## 2024-02-14 DIAGNOSIS — F43.20 ADJUSTMENT DISORDER, UNSPECIFIED TYPE: Primary | ICD-10-CM

## 2024-02-14 PROCEDURE — 90837 PSYTX W PT 60 MINUTES: CPT | Mod: S$GLB,,, | Performed by: SOCIAL WORKER

## 2024-02-14 PROCEDURE — 4010F ACE/ARB THERAPY RXD/TAKEN: CPT | Mod: CPTII,S$GLB,, | Performed by: SOCIAL WORKER

## 2024-02-14 NOTE — PROGRESS NOTES
"  Individual Psychotherapy (PhD/LCSW)    2/14/2024     Site:  [facility]         Therapeutic Intervention: Met with patient.    Outpatient - Insight oriented psychotherapy 60 min - CPT code 32257, Outpatient - Behavior modifying psychotherapy 60 min - CPT code 09278, Outpatient - Supportive psychotherapy 60 min - CPT Code 62980, and Outpatient - Interactive psychotherapy 60 min - CPT code 41100     Chief complaint/reason for encounter: interpersonal     Interval history and content of current session: Pt is a 58 year-old  white female who presents today for a follow up therapy session. Pt reports that she is relieved to be done with the Kd ball and [employer]. States it was difficult to see ex with his new girlfriend. Pt reports, "I probably drank a little too much." Did get to spend a lot of time with the kids while they were in town. Continues to set boundaries with ex . Has asked him not to text her if she is "ok." States that he still has not signed financial agreement, however pt adjusting to the possibility that this might not happen. States he wants to sell the house. Pt placing this responsibility on him since he has been in control of their finances. Pt sad about possibly losing the house as she feels comfortable staying in this community. Does feel positive about spending more time with a close friend, Sravani, who is also  and who grew up with pt. They are planning a big trip for their 60th birthdays.     Action Plan:  Work on how to engage with ex  without feeling triggered emotionally  Decrease engagement/energy on ex- and his new girlfriend's relationship    Treatment plan:  Target symptoms: anxiety , interpersonal issues  Why chosen therapy is appropriate versus another modality: relevant to diagnosis, patient responds to this modality, evidence based practice  Outcome monitoring methods: self-report, observation  Therapeutic intervention type: insight " oriented psychotherapy, behavior modifying psychotherapy, supportive psychotherapy    Risk parameters:  Patient reports no suicidal ideation  Patient reports no homicidal ideation  Patient reports no self-injurious behavior  Patient reports no violent behavior    Verbal deficits: None    Patient's response to intervention:  The patient's response to intervention is accepting.    Progress toward goals and other mental status changes:  The patient's progress toward goals is excellent.    Diagnosis:     ICD-10-CM ICD-9-CM   1. Adjustment disorder, unspecified type  F43.20 309.9       Plan:  individual psychotherapy    Return to clinic: 2 weeks    Length of Service (minutes): 60

## 2024-02-16 ENCOUNTER — PATIENT MESSAGE (OUTPATIENT)
Dept: ADMINISTRATIVE | Facility: OTHER | Age: 59
End: 2024-02-16

## 2024-02-20 ENCOUNTER — TELEPHONE (OUTPATIENT)
Dept: DERMATOLOGY | Facility: CLINIC | Age: 59
End: 2024-02-20

## 2024-02-20 ENCOUNTER — PATIENT MESSAGE (OUTPATIENT)
Dept: DERMATOLOGY | Facility: CLINIC | Age: 59
End: 2024-02-20

## 2024-02-20 NOTE — TELEPHONE ENCOUNTER
I spoke with Mrs. Mccannefer about a message I received from Dr. White to get her booked for a soon appointment. I offered her a spot on 02/26/2024 at 2 PM.  She accepted the appointment and thanked me for the call.

## 2024-02-26 ENCOUNTER — OFFICE VISIT (OUTPATIENT)
Dept: DERMATOLOGY | Facility: CLINIC | Age: 59
End: 2024-02-26

## 2024-02-26 DIAGNOSIS — L72.0 MILIA: Primary | ICD-10-CM

## 2024-02-26 DIAGNOSIS — L82.1 SK (SEBORRHEIC KERATOSIS): ICD-10-CM

## 2024-02-26 PROCEDURE — 99999 PR PBB SHADOW E&M-EST. PATIENT-LVL III: CPT | Mod: PBBFAC,,, | Performed by: DERMATOLOGY

## 2024-02-26 PROCEDURE — 99212 OFFICE O/P EST SF 10 MIN: CPT | Mod: S$PBB,,, | Performed by: DERMATOLOGY

## 2024-02-26 PROCEDURE — 99213 OFFICE O/P EST LOW 20 MIN: CPT | Mod: PBBFAC,PN | Performed by: DERMATOLOGY

## 2024-02-26 NOTE — PATIENT INSTRUCTIONS

## 2024-02-26 NOTE — PROGRESS NOTES
Subjective:      Patient ID:  Rhoda Daly is a 58 y.o. female who presents for   Chief Complaint   Patient presents with    Lesion     Forehead      Lesion - Initial  Affected locations: forehead  Severity: mild to moderate  Timing: constant    Present x 1-2 months.  Asymptomatic and no prior treatment.    Has hx of BCC R lower forehead excised by SSW in 12/2023.    Review of Systems   Constitutional: Negative.  Negative for fever and chills.   HENT: Negative.     Respiratory: Negative.  Negative for cough and shortness of breath.    Gastrointestinal:  Negative for nausea and vomiting.   Musculoskeletal: Negative.  Negative for joint swelling and arthralgias.   Skin:  Positive for daily sunscreen use. Negative for activity-related sunscreen use, recent sunburn and wears hat.   All other systems reviewed and are negative.  Hematologic/Lymphatic: Does not bruise/bleed easily.       Objective:   Physical Exam   Constitutional: She appears well-developed and well-nourished.   Neurological: She is alert and oriented to person, place, and time.   Psychiatric: She has a normal mood and affect.   Skin:   Areas Examined (abnormalities noted in diagram):   Head / Face Inspection Performed  RLE Inspected                 Diagram Legend     Erythematous scaling macule/papule c/w actinic keratosis       Vascular papule c/w angioma      Pigmented verrucoid papule/plaque c/w seborrheic keratosis      Yellow umbilicated papule c/w sebaceous hyperplasia      Irregularly shaped tan macule c/w lentigo     1-2 mm smooth white papules consistent with Milia      Movable subcutaneous cyst with punctum c/w epidermal inclusion cyst      Subcutaneous movable cyst c/w pilar cyst      Firm pink to brown papule c/w dermatofibroma      Pedunculated fleshy papule(s) c/w skin tag(s)      Evenly pigmented macule c/w junctional nevus     Mildly variegated pigmented, slightly irregular-bordered macule c/w mildly atypical nevus      Flesh colored  to evenly pigmented papule c/w intradermal nevus       Pink pearly papule/plaque c/w basal cell carcinoma      Erythematous hyperkeratotic cursted plaque c/w SCC      Surgical scar with no sign of skin cancer recurrence      Open and closed comedones      Inflammatory papules and pustules      Verrucoid papule consistent consistent with wart     Erythematous eczematous patches and plaques     Dystrophic onycholytic nail with subungual debris c/w onychomycosis     Umbilicated papule    Erythematous-base heme-crusted tan verrucoid plaque consistent with inflamed seborrheic keratosis     Erythematous Silvery Scaling Plaque c/w Psoriasis     See annotation      Assessment / Plan:        Milia  This is a benign cyst. Reassurance provided. No treatment is necessary unless it is symptomatic. These may resolve on their own.    SK (seborrheic keratosis)  These are benign inherited growths without a malignant potential. Reassurance given to patient. No treatment is necessary.   Treatment of benign, asymptomatic lesions may be considered cosmetic.  Warned about risk of hypo- or hyperpigmentation with treatment and risk of recurrence.    Follow up in about 2 months (around 4/26/2024) for skin check or sooner for any concerns.

## 2024-02-27 ENCOUNTER — OFFICE VISIT (OUTPATIENT)
Dept: PSYCHIATRY | Facility: CLINIC | Age: 59
End: 2024-02-27

## 2024-02-27 DIAGNOSIS — F43.20 ADJUSTMENT DISORDER, UNSPECIFIED TYPE: Primary | ICD-10-CM

## 2024-02-27 PROCEDURE — 90837 PSYTX W PT 60 MINUTES: CPT | Mod: ,,, | Performed by: SOCIAL WORKER

## 2024-02-27 NOTE — PROGRESS NOTES
"  Individual Psychotherapy (PhD/LCSW)    2/27/2024     Site:  [facility]         Therapeutic Intervention: Met with patient.    Outpatient - Insight oriented psychotherapy 60 min - CPT code 29444, Outpatient - Behavior modifying psychotherapy 60 min - CPT code 00756, Outpatient - Supportive psychotherapy 60 min - CPT Code 95539, and Outpatient - Interactive psychotherapy 60 min - CPT code 73501     Chief complaint/reason for encounter: interpersonal     Interval history and content of current session: Pt is a 58 year-old  white female who presents today for a follow up therapy session. Pt reports multiple stressors these past few weeks. Friend Tracie recently diagnosed with Stage 0 breast cancer. Pt very upset about this, however states that she and her close friends are all "in this together" with Tracie. Pt feels some guilt about being more focused on divorce and community chaos with ex- but is giving herself marlyn. States that ex has still refused to sign a financial agreement although sounds like he is giving her some sense of what to expect on a monthly basis. Pt continues to feel triggered by his comments which can be confusing and conflicting. Pt acknowledges that he cannot be trusted. 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 Pt stressed about this as she has not had a full time job in years. States that she worries it will be overwhelming, especially as she may also be in the process of selling her house. Pt and clinician reviewed how pt tends to make assumptions of worst case scenario. Pt able to acknowledge that it may all work out including the job. Pt reports that she still feels triggered by ex 's comments and would like to be able to set better boundaries versus react. Pt and clinician reviewed ABC worksheets that pt can complete to help practice ways she can challenge some of the thoughts she tells herself when triggered. Pt to " complete 6 before next session.     Action Plan:  Complete ABC worksheets    Treatment plan:  Target symptoms: anxiety , interpersonal issues  Why chosen therapy is appropriate versus another modality: relevant to diagnosis, patient responds to this modality, evidence based practice  Outcome monitoring methods: self-report, observation  Therapeutic intervention type: insight oriented psychotherapy, behavior modifying psychotherapy, supportive psychotherapy    Risk parameters:  Patient reports no suicidal ideation  Patient reports no homicidal ideation  Patient reports no self-injurious behavior  Patient reports no violent behavior    Verbal deficits: None    Patient's response to intervention:  The patient's response to intervention is accepting.    Progress toward goals and other mental status changes:  The patient's progress toward goals is excellent.    Diagnosis:     ICD-10-CM ICD-9-CM   1. Adjustment disorder, unspecified type  F43.20 309.9       Plan:  individual psychotherapy    Return to clinic: 2 weeks    Length of Service (minutes): 60

## 2024-03-06 ENCOUNTER — PATIENT MESSAGE (OUTPATIENT)
Dept: PSYCHIATRY | Facility: CLINIC | Age: 59
End: 2024-03-06

## 2024-03-26 ENCOUNTER — OFFICE VISIT (OUTPATIENT)
Dept: PSYCHIATRY | Facility: CLINIC | Age: 59
End: 2024-03-26
Payer: COMMERCIAL

## 2024-03-26 DIAGNOSIS — F43.20 ADJUSTMENT DISORDER, UNSPECIFIED TYPE: Primary | ICD-10-CM

## 2024-03-26 PROCEDURE — 90837 PSYTX W PT 60 MINUTES: CPT | Mod: 95,,, | Performed by: SOCIAL WORKER

## 2024-03-26 NOTE — PROGRESS NOTES
Individual Psychotherapy (PhD/LCSW)    3/26/2024     The patient location is: [place], [place]  The chief complaint leading to consultation is: adjustment    Visit type: audiovisual    Face to Face time with patient: 53 min  60 minutes of total time spent on the encounter, which includes face to face time and non-face to face time preparing to see the patient (eg, review of tests), Obtaining and/or reviewing separately obtained history, Documenting clinical information in the electronic or other health record, Independently interpreting results (not separately reported) and communicating results to the patient/family/caregiver, or Care coordination (not separately reported).         Each patient to whom he or she provides medical services by telemedicine is:  (1) informed of the relationship between the physician and patient and the respective role of any other health care provider with respect to management of the patient; and (2) notified that he or she may decline to receive medical services by telemedicine and may withdraw from such care at any time.    Notes:       Site:  [facility]         Therapeutic Intervention: Met with patient.    Outpatient - Insight oriented psychotherapy 60 min - CPT code 07338, Outpatient - Behavior modifying psychotherapy 60 min - CPT code 68014, Outpatient - Supportive psychotherapy 60 min - CPT Code 91048, and Outpatient - Interactive psychotherapy 60 min - CPT code 82576     Chief complaint/reason for encounter: interpersonal     Interval history and content of current session: Pt is a 58 year-old  white female who presents today for a follow up therapy session. Pt started new job at [employer]. Transition has been smooth. Pt expecting that she will have a lot more work in the future, however states that it has been okay. Feels good about making money. Ex  signed financial agreement. Pt worries about the future and having to deal with him for financial security  long term. Discussed the possibility that things may get easier around this as pt adjusts to the new norm. Pt reports that house is on the market. Mixed feelings but looking forward to moving into a place that isn't associated with ex. Pt reports that she has been social, spoke to a male friend at a party she was invited to and enjoyed the experience. Feels empowered to be able to do the things that make her happy. Pt reports a decrease in exercise since starting work. Is helping care for her friend who just had major surgery to remove breast cancer. Pt states that once friend is doing better, she is hopeful she will get back into an exercise routine. Overall, pt appears less anxious. Feeling confident.     Treatment plan:  Target symptoms: anxiety , interpersonal issues  Why chosen therapy is appropriate versus another modality: relevant to diagnosis, patient responds to this modality, evidence based practice  Outcome monitoring methods: self-report, observation  Therapeutic intervention type: insight oriented psychotherapy, behavior modifying psychotherapy, supportive psychotherapy    Risk parameters:  Patient reports no suicidal ideation  Patient reports no homicidal ideation  Patient reports no self-injurious behavior  Patient reports no violent behavior    Verbal deficits: None    Patient's response to intervention:  The patient's response to intervention is accepting.    Progress toward goals and other mental status changes:  The patient's progress toward goals is excellent.    Diagnosis:     ICD-10-CM ICD-9-CM   1. Adjustment disorder, unspecified type  F43.20 309.9       Plan:  individual psychotherapy    Return to clinic: 2 weeks    Length of Service (minutes): 60

## 2024-04-09 ENCOUNTER — OFFICE VISIT (OUTPATIENT)
Dept: PSYCHIATRY | Facility: CLINIC | Age: 59
End: 2024-04-09
Payer: COMMERCIAL

## 2024-04-09 DIAGNOSIS — F43.20 ADJUSTMENT DISORDER, UNSPECIFIED TYPE: Primary | ICD-10-CM

## 2024-04-09 PROCEDURE — 90837 PSYTX W PT 60 MINUTES: CPT | Mod: 95,,, | Performed by: SOCIAL WORKER

## 2024-04-09 NOTE — PROGRESS NOTES
Individual Psychotherapy (PhD/LCSW)    4/9/2024     The patient location is: [place], [place]  The chief complaint leading to consultation is: interpersonal    Visit type: audiovisual    Face to Face time with patient: 53 min  60 minutes of total time spent on the encounter, which includes face to face time and non-face to face time preparing to see the patient (eg, review of tests), Obtaining and/or reviewing separately obtained history, Documenting clinical information in the electronic or other health record, Independently interpreting results (not separately reported) and communicating results to the patient/family/caregiver, or Care coordination (not separately reported).         Each patient to whom he or she provides medical services by telemedicine is:  (1) informed of the relationship between the physician and patient and the respective role of any other health care provider with respect to management of the patient; and (2) notified that he or she may decline to receive medical services by telemedicine and may withdraw from such care at any time.    Notes:     Chief complaint/reason for encounter: interpersonal     Interval history and content of current session: Pt is a 58 year-old  white female who presents today for a follow up therapy session. Pt doing well. Planning to cancel trip to [place] with camp friends. Feeling too overwhelmed with new job and house on the market. States she wanted to talk to clinician before canceling flight and calling friend who is coordinating. Spent time reviewing pros and cons. Pt ultimately expressing that she will feel relief once she cancels. Pt reports that work is becoming more busy. Pt has less flexibility. Getting weekend calls. Discussed strategies for how to manage this better/set boundaries. Plans on doing more check ins with boss during the week.     Pt reports that ex  continues to be unreliable in terms of communication. Pt doing well  with holding boundaries around responding to him whenever his texts are wishy washy or vague. Overall, pt seems to be doing well. Planning for possible move to a rental apartment in the next few months.     Treatment plan:  Target symptoms: anxiety , interpersonal issues  Why chosen therapy is appropriate versus another modality: relevant to diagnosis, patient responds to this modality, evidence based practice  Outcome monitoring methods: self-report, observation  Therapeutic intervention type: insight oriented psychotherapy, behavior modifying psychotherapy, supportive psychotherapy    Risk parameters:  Patient reports no suicidal ideation  Patient reports no homicidal ideation  Patient reports no self-injurious behavior  Patient reports no violent behavior    Verbal deficits: None    Patient's response to intervention:  The patient's response to intervention is accepting.    Progress toward goals and other mental status changes:  The patient's progress toward goals is excellent.    Diagnosis:     ICD-10-CM ICD-9-CM   1. Adjustment disorder, unspecified type  F43.20 309.9       Plan:  individual psychotherapy    Return to clinic: 2 weeks    Length of Service (minutes): 60

## 2024-04-11 ENCOUNTER — PATIENT MESSAGE (OUTPATIENT)
Dept: OBSTETRICS AND GYNECOLOGY | Facility: CLINIC | Age: 59
End: 2024-04-11
Payer: COMMERCIAL

## 2024-04-15 ENCOUNTER — PATIENT MESSAGE (OUTPATIENT)
Dept: INTERNAL MEDICINE | Facility: CLINIC | Age: 59
End: 2024-04-15
Payer: COMMERCIAL

## 2024-04-15 DIAGNOSIS — I10 ESSENTIAL HYPERTENSION: ICD-10-CM

## 2024-04-15 NOTE — TELEPHONE ENCOUNTER
No care due was identified.  [facility] Embedded Care Due Messages. Reference number: [id number].   4/15/2024 10:28:01 AM CDT

## 2024-04-16 RX ORDER — VALSARTAN 40 MG/1
40 TABLET ORAL DAILY
Qty: 90 TABLET | Refills: 2 | Status: SHIPPED | OUTPATIENT
Start: 2024-04-16

## 2024-04-16 NOTE — TELEPHONE ENCOUNTER
Refill Routing Note   Medication(s) are not appropriate for processing by [facility] for the following reason(s):        Required vitals abnormal    [facility] action(s):  Defer             Appointments  past 12m or future 3m with PCP    Date Provider   Last Visit   12/12/2023 Manoj Stein MD   Next Visit   Visit date not found Manoj Stein MD   ED visits in past 90 days: 0        Note composed:11:31 PM 04/15/2024

## 2024-04-22 ENCOUNTER — PATIENT MESSAGE (OUTPATIENT)
Dept: DERMATOLOGY | Facility: CLINIC | Age: 59
End: 2024-04-22
Payer: COMMERCIAL

## 2024-04-23 ENCOUNTER — OFFICE VISIT (OUTPATIENT)
Dept: PSYCHIATRY | Facility: CLINIC | Age: 59
End: 2024-04-23
Payer: COMMERCIAL

## 2024-04-23 DIAGNOSIS — F43.20 ADJUSTMENT DISORDER, UNSPECIFIED TYPE: Primary | ICD-10-CM

## 2024-04-23 PROCEDURE — 4010F ACE/ARB THERAPY RXD/TAKEN: CPT | Mod: CPTII,95,, | Performed by: SOCIAL WORKER

## 2024-04-23 PROCEDURE — 90834 PSYTX W PT 45 MINUTES: CPT | Mod: 95,,, | Performed by: SOCIAL WORKER

## 2024-04-23 NOTE — PROGRESS NOTES
Individual Psychotherapy (PhD/LCSW)    4/23/2024     The patient location is: [place], [place]  The chief complaint leading to consultation is: interpersonal    Visit type: audiovisual    Face to Face time with patient: 45 min  50 minutes of total time spent on the encounter, which includes face to face time and non-face to face time preparing to see the patient (eg, review of tests), Obtaining and/or reviewing separately obtained history, Documenting clinical information in the electronic or other health record, Independently interpreting results (not separately reported) and communicating results to the patient/family/caregiver, or Care coordination (not separately reported).         Each patient to whom he or she provides medical services by telemedicine is:  (1) informed of the relationship between the physician and patient and the respective role of any other health care provider with respect to management of the patient; and (2) notified that he or she may decline to receive medical services by telemedicine and may withdraw from such care at any time.    Notes:     Site:  [facility]         Therapeutic Intervention: Met with patient.     Outpatient - Insight oriented psychotherapy 45 min - CPT code 11496, Outpatient - Behavior modifying psychotherapy 45 min - CPT code 87883, Outpatient - Supportive psychotherapy 45 min - CPT Code 83393, and Outpatient - Interactive psychotherapy 45 min - CPT code 40273     Chief complaint/reason for encounter: interpersonal     Interval history and content of current session: Pt is a 58 year-old  white female who presents today for a follow up therapy session. Pt reports stress related to taxes and selling the house. Reports that she is overwhelmed with the way some of the processes work at the job. Still trying to figure this out. Discussed having compassion and patience for self given that it will take time for pt to get comfortable with this. States no  evidence that people are frustrated. Has gotten positive feedback. Pt reports that she asked someone out on a date. This is a person who knows mutual friends. Pt proud of herself. Plans to go to dinner next Monday.     Decided to throw a 59th birthday party for herself in a few months. Pt feeling good about this. Not inviting ex- and feels okay with this. Has been setting good boundaries with him. They have not interacted much. Pt states that she and her mother are visiting her kids in [place] to [facility] to see Ayaz perform in a comedy show. Pt very excited about this.     Pt and clinician have two sessions prior to terminating. Pt aware that she will be transitioning to Dr. Lagunas in the summer.       Treatment plan:  Target symptoms: anxiety , interpersonal issues  Why chosen therapy is appropriate versus another modality: relevant to diagnosis, patient responds to this modality, evidence based practice  Outcome monitoring methods: self-report, observation  Therapeutic intervention type: insight oriented psychotherapy, behavior modifying psychotherapy, supportive psychotherapy    Risk parameters:  Patient reports no suicidal ideation  Patient reports no homicidal ideation  Patient reports no self-injurious behavior  Patient reports no violent behavior    Verbal deficits: None    Patient's response to intervention:  The patient's response to intervention is accepting.    Progress toward goals and other mental status changes:  The patient's progress toward goals is excellent.    Diagnosis:     ICD-10-CM ICD-9-CM   1. Adjustment disorder, unspecified type  F43.20 309.9       Plan:  individual psychotherapy    Return to clinic: 2 weeks    Length of Service (minutes): 45

## 2024-05-06 ENCOUNTER — PATIENT MESSAGE (OUTPATIENT)
Dept: PSYCHIATRY | Facility: CLINIC | Age: 59
End: 2024-05-06
Payer: COMMERCIAL

## 2024-05-21 ENCOUNTER — OFFICE VISIT (OUTPATIENT)
Dept: PSYCHIATRY | Facility: CLINIC | Age: 59
End: 2024-05-21
Payer: COMMERCIAL

## 2024-05-21 DIAGNOSIS — F43.20 ADJUSTMENT DISORDER, UNSPECIFIED TYPE: Primary | ICD-10-CM

## 2024-05-21 PROCEDURE — 4010F ACE/ARB THERAPY RXD/TAKEN: CPT | Mod: CPTII,S$GLB,, | Performed by: SOCIAL WORKER

## 2024-05-21 PROCEDURE — 90837 PSYTX W PT 60 MINUTES: CPT | Mod: S$GLB,,, | Performed by: SOCIAL WORKER

## 2024-05-21 NOTE — PROGRESS NOTES
Individual Psychotherapy (PhD/LCSW)    5/21/2024       Site:  [facility]         Therapeutic Intervention: Met with patient.      Outpatient - Insight oriented psychotherapy 60 min - CPT code 49748, Outpatient - Behavior modifying psychotherapy 60 min - CPT code 09075, Outpatient - Supportive psychotherapy 60 min - CPT Code 78788, and Outpatient - Interactive psychotherapy 60 min - CPT code 05987     Chief complaint/reason for encounter: interpersonal     Interval history and content of current session: Pt is a 58 year-old  white female who presents today for a follow up therapy session. Pt reports that she is doing well in terms of mood. House is pending sale. States closing is on June 11th. Moves into an apartment on June 4th. States she is excited for this next chapter in her life. Reports she has gone on about five dates with a man she knew from high school since last session. Has enjoyed his company. States this person, Sesar, lives in [place] but has a property downtown where he stays from time to time. Reports that she is hoping to see him again after his travels with his kids to [place] and [place], however is not putting too much stock into the relationship. Pt reports that she is looking forward to the party she is throwing herself for her 59th birthday. Feels empowered compared to when she started therapy a few years ago. Reports that she doesn't feel bothered by ex  anymore. Their financial agreement started May 1st. Pt feeling independent and in control of a lot more decisions in her life. States that she is doing fine with her job although wishes she did not have to work so much. States that her kids are coming down for her birthday. They will be going out to dinner with her and her mother. Pt thankful for therapy with this clinician, stating that she feels like she has come out on the other side.     Pt to transfer to [facility], INOCENCIAW, PH.D. this summer.       Treatment  plan:  Target symptoms: anxiety , interpersonal issues  Why chosen therapy is appropriate versus another modality: relevant to diagnosis, patient responds to this modality, evidence based practice  Outcome monitoring methods: self-report, observation  Therapeutic intervention type: insight oriented psychotherapy, behavior modifying psychotherapy, supportive psychotherapy    Risk parameters:  Patient reports no suicidal ideation  Patient reports no homicidal ideation  Patient reports no self-injurious behavior  Patient reports no violent behavior    Verbal deficits: None    Patient's response to intervention:  The patient's response to intervention is accepting.    Progress toward goals and other mental status changes:  The patient's progress toward goals is excellent.    Diagnosis:     ICD-10-CM ICD-9-CM   1. Adjustment disorder, unspecified type  F43.20 309.9       Plan:  individual psychotherapy    Return to clinic: 2 weeks    Length of Service (minutes): 60

## 2024-05-28 ENCOUNTER — PATIENT MESSAGE (OUTPATIENT)
Dept: OBSTETRICS AND GYNECOLOGY | Facility: CLINIC | Age: 59
End: 2024-05-28
Payer: COMMERCIAL

## 2024-06-10 ENCOUNTER — PATIENT MESSAGE (OUTPATIENT)
Dept: INTERNAL MEDICINE | Facility: CLINIC | Age: 59
End: 2024-06-10
Payer: COMMERCIAL

## 2024-06-24 ENCOUNTER — OFFICE VISIT (OUTPATIENT)
Dept: PSYCHIATRY | Facility: CLINIC | Age: 59
End: 2024-06-24
Payer: COMMERCIAL

## 2024-06-24 DIAGNOSIS — F41.9 ANXIETY: Primary | ICD-10-CM

## 2024-06-24 PROCEDURE — 4010F ACE/ARB THERAPY RXD/TAKEN: CPT | Mod: CPTII,S$GLB,, | Performed by: SOCIAL WORKER

## 2024-06-24 PROCEDURE — 90791 PSYCH DIAGNOSTIC EVALUATION: CPT | Mod: S$GLB,,, | Performed by: SOCIAL WORKER

## 2024-06-24 NOTE — PROGRESS NOTES
"Psychiatry Initial Visit (PhD/LCSW)  Diagnostic Interview - CPT 03998    Date: 6/24/2024    Site: [facility]    Referral source: Kika Boyce LCSW    Clinical status of patient: Outpatient    Rhoda Daly, a 59 y.o. female, for initial evaluation visit.  Met with patient.    Chief complaint/reason for encounter: anxiety    History of present illness: Pt started Tx due to issues in her marriage, currently  and doing well except for some financial concerns (ex is not keeping all his financial agreements). Pt eats and sleeps well, enjoys close friendships, gave herself a 59th birthday party. No panic attacks, but concerns about lack of savings and financial insecurity. Also anxious about having started dating. Pt will schedule f/u and we will decide on a plan for Tx.    Pain: noncontributory    Symptoms:   Mood: denied  Anxiety: excessive anxiety/worry  Substance abuse: denied  Cognitive functioning: denied  Health behaviors: noncontributory    Psychiatric history: has participated in counseling/psychotherapy on an outpatient basis in the past    Medical history: noncontributory    Family history of psychiatric illness:  mother has been treated for depression, son and daughter both have anxiety, daughter has ADD    Social history (marriage, employment, etc.): Pt was  x 27 years,  "narcissist", overspent, didn't pay taxes, spent money being "[employer] and having daughter be nunez against pt's wishes, worked in life insurance and estate planning, functional alcoholic per pt, and began having an affair but denied it. Pt's mother is 86, lives independently. Son 28yo, lives in [place], has day job and trying to get into comedy. Daughter 23 also in [place], a consultant. Both doing well. Pt works at [employer] in [employer]. Pt has great friends, beginning to date. Main anxiety is lack of savings and 's unreliability about paying debts and providing agreed-on financial support for " her.    Substance use:   Alcohol: social   Drugs: none   Tobacco: none   Caffeine: coffee in morning    Current medications and drug reactions (include OTC, herbal): see medication list     Strengths and liabilities: Strength: Patient accepts guidance/feedback, Strength: Patient is expressive/articulate., Strength: Patient is intelligent., Strength: Patient is physically healthy., Strength: Patient has positive support network.    Current Evaluation:     Mental Status Exam:  General Appearance:  age appropriate, petite, fine brown hair, neatly dressed in black jersey and pleated skirt, tells her story clearly, in great detail   Speech: normal tone, normal rate, normal pitch, normal volume      Level of Cooperation: cooperative      Thought Processes: normal and logical   Mood: anxious      Thought Content: normal, no suicidality, no homicidality, delusions, or paranoia   Affect: congruent and appropriate   Orientation: Oriented x3   Memory: recent >  intact, remote >  intact   Attention Span & Concentration: intact   Fund of General Knowledge: intact and appropriate to age and level of education   Abstract Reasoning: intact   Judgment & Insight: good     Language  intact     Diagnostic Impression - Plan:     anxiety    Plan:individual psychotherapy    Return to Clinic:  pt will schedule 2 sessions and we will reassess for Tx plan    Length of Service (minutes): 45

## 2024-06-26 ENCOUNTER — OFFICE VISIT (OUTPATIENT)
Dept: OBSTETRICS AND GYNECOLOGY | Facility: CLINIC | Age: 59
End: 2024-06-26
Payer: COMMERCIAL

## 2024-06-26 VITALS
BODY MASS INDEX: 19.23 KG/M2 | HEIGHT: 64 IN | SYSTOLIC BLOOD PRESSURE: 100 MMHG | DIASTOLIC BLOOD PRESSURE: 70 MMHG | WEIGHT: 112.63 LBS

## 2024-06-26 DIAGNOSIS — Z01.419 WELL WOMAN EXAM WITH ROUTINE GYNECOLOGICAL EXAM: Primary | ICD-10-CM

## 2024-06-26 PROCEDURE — 3074F SYST BP LT 130 MM HG: CPT | Mod: CPTII,S$GLB,, | Performed by: STUDENT IN AN ORGANIZED HEALTH CARE EDUCATION/TRAINING PROGRAM

## 2024-06-26 PROCEDURE — 99999 PR PBB SHADOW E&M-EST. PATIENT-LVL III: CPT | Mod: PBBFAC,,, | Performed by: STUDENT IN AN ORGANIZED HEALTH CARE EDUCATION/TRAINING PROGRAM

## 2024-06-26 PROCEDURE — 3008F BODY MASS INDEX DOCD: CPT | Mod: CPTII,S$GLB,, | Performed by: STUDENT IN AN ORGANIZED HEALTH CARE EDUCATION/TRAINING PROGRAM

## 2024-06-26 PROCEDURE — 99396 PREV VISIT EST AGE 40-64: CPT | Mod: S$GLB,,, | Performed by: STUDENT IN AN ORGANIZED HEALTH CARE EDUCATION/TRAINING PROGRAM

## 2024-06-26 PROCEDURE — 4010F ACE/ARB THERAPY RXD/TAKEN: CPT | Mod: CPTII,S$GLB,, | Performed by: STUDENT IN AN ORGANIZED HEALTH CARE EDUCATION/TRAINING PROGRAM

## 2024-06-26 PROCEDURE — 3078F DIAST BP <80 MM HG: CPT | Mod: CPTII,S$GLB,, | Performed by: STUDENT IN AN ORGANIZED HEALTH CARE EDUCATION/TRAINING PROGRAM

## 2024-06-26 PROCEDURE — 1159F MED LIST DOCD IN RCRD: CPT | Mod: CPTII,S$GLB,, | Performed by: STUDENT IN AN ORGANIZED HEALTH CARE EDUCATION/TRAINING PROGRAM

## 2024-06-26 NOTE — PROGRESS NOTES
History & Physical  Gynecology      SUBJECTIVE:     Chief Complaint: Well Woman       History of Present Illness:  Annual exam. No complaints  Menstrual History: menopausal since age early 50s. No HRT. No PMB. Vaginal dryness at times, would like to start back estrace cream.  Obstetric Hx: 2; 1SVD 1 CS  Sexually Active: one male partner. Not recently  Family history: Denies any personal or family history of GYN/colon cancers   Social: Wears seatbelts. Exercises. Feels safe at home.   Last pap:  normal, HPV neg  Last mammo: 2024  DEXA: 2023 osteopenia  Colonoscopy: due   covid vaccine yes  Labs utd  Vitamins: off and on      Review of patient's allergies indicates:  No Known Allergies    Past Medical History:   Diagnosis Date    Basal cell carcinoma, forehead 2023    right lower forehead    Colon polyp 2015    Diverticulitis     Hypertension      Past Surgical History:   Procedure Laterality Date     SECTION      COLONOSCOPY N/A 3/4/2022    Procedure: COLONOSCOPY;  Surgeon: Elvin Parker MD;  [facility] ([address] [place] [address]);  Service: Endoscopy;  Laterality: N/A;  Pt. is Fully Vaccinated.EC   rescheduled; covid test  @ [facility]; instructions emailed-st  2nd floor due to availability   pt rescheduled multiple times; covid + 22 < 60 days; instructions emailed-st  3/2-pt confirmed appt for 3/4/22-BB     OB History          4    Para   4    Term   2            AB        Living   2         SAB        IAB        Ectopic        Multiple        Live Births   2               Family History   Problem Relation Name Age of Onset    Hypertension Mother nephrectomy     Peripheral vascular disease Mother nephrectomy         renal artery stenosis    Skin cancer Mother nephrectomy     Hyperlipidemia Father      Heart disease Father  80        cad, in 70s    Melanoma Father      Skin cancer Sister      Breast cancer Neg Hx      Colon cancer Neg Hx      Ovarian cancer Neg  Hx      Cancer Neg Hx       Social History     Tobacco Use    Smoking status: Never    Smokeless tobacco: Never   Substance Use Topics    Alcohol use: Yes     Comment: daily, beer tonight    Drug use: No       Current Outpatient Medications   Medication Sig    pantoprazole (PROTONIX) 20 MG tablet Take 1 tablet (20 mg total) by mouth once daily.    valsartan (DIOVAN) 40 MG tablet Take 1 tablet (40 mg total) by mouth once daily.    ALPRAZolam (XANAX) 0.25 MG tablet Take 1 tablet (0.25 mg total) by mouth 2 (two) times daily as needed for Anxiety.    desoximetasone (TOPICORT) 0.25 % cream Apply topically 2 (two) times daily. x 1-2 wks then prn flares only (Patient not taking: Reported on 6/26/2024)     No current facility-administered medications for this visit.         Review of Systems:  Review of Systems   Constitutional:  Negative for activity change, appetite change, fever and unexpected weight change.   Respiratory:  Negative for cough and shortness of breath.    Cardiovascular:  Negative for chest pain.   Gastrointestinal:  Negative for abdominal pain, blood in stool, constipation, diarrhea, nausea and vomiting.   Genitourinary:  Negative for dysuria, hematuria, pelvic pain, vaginal bleeding, vaginal discharge, vaginal pain, urinary incontinence, postmenopausal bleeding, vaginal dryness and vaginal odor.   Integumentary:  Negative for breast mass.   Neurological:  Negative for headaches.   Psychiatric/Behavioral:  Negative for sleep disturbance.    Breast: Negative for lump and mass       OBJECTIVE:     Physical Exam:  Physical Exam  Vitals reviewed.   Constitutional:       General: She is not in acute distress.     Appearance: She is well-developed. She is not diaphoretic.   HENT:      Head: Normocephalic and atraumatic.   Eyes:      General: No scleral icterus.        Right eye: No discharge.         Left eye: No discharge.      Conjunctiva/sclera: Conjunctivae normal.   Neck:      Thyroid: No thyromegaly.    Cardiovascular:      Rate and Rhythm: Normal rate.   Pulmonary:      Effort: Pulmonary effort is normal.   Chest:   Breasts:     Breasts are symmetrical.      Right: No inverted nipple, mass, nipple discharge, skin change or tenderness.      Left: No inverted nipple, mass, nipple discharge, skin change or tenderness.   Abdominal:      General: There is no distension.      Palpations: Abdomen is soft.      Tenderness: There is no abdominal tenderness.   Genitourinary:     Labia:         Right: No rash, tenderness, lesion or injury.         Left: No rash, tenderness, lesion or injury.       Vagina: Normal. No signs of injury and foreign body. No vaginal discharge, erythema, tenderness or bleeding.      Cervix: No cervical motion tenderness, discharge or friability.      Uterus: Not deviated, not enlarged, not fixed and not tender.       Adnexa:         Right: No mass, tenderness or fullness.          Left: No mass, tenderness or fullness.     Musculoskeletal:         General: Normal range of motion.      Cervical back: Normal range of motion and neck supple.   Lymphadenopathy:      Cervical: No cervical adenopathy.   Skin:     General: Skin is warm and dry.      Findings: No erythema or rash.   Neurological:      Mental Status: She is alert and oriented to person, place, and time.         ASSESSMENT:       ICD-10-CM ICD-9-CM    1. Well woman exam with routine gynecological exam  Z01.419 V72.31                Plan:      WWE  - Postmenopausal.   - Vaccines utd  - Labs utd  - Mammogram, Colonoscopy utd  - Pap utd   - DEXA utd  - Vitamin D and Calcium recs given  - CBE normal. Physical exam normal. VSS      Counseling time: 30 minutes    Cherelle Lopez

## 2024-07-29 ENCOUNTER — OFFICE VISIT (OUTPATIENT)
Dept: PSYCHIATRY | Facility: CLINIC | Age: 59
End: 2024-07-29
Payer: COMMERCIAL

## 2024-07-29 DIAGNOSIS — F41.9 ANXIETY: Primary | ICD-10-CM

## 2024-07-29 PROCEDURE — 4010F ACE/ARB THERAPY RXD/TAKEN: CPT | Mod: CPTII,S$GLB,, | Performed by: SOCIAL WORKER

## 2024-07-29 PROCEDURE — 90834 PSYTX W PT 45 MINUTES: CPT | Mod: S$GLB,,, | Performed by: SOCIAL WORKER

## 2024-07-29 NOTE — PROGRESS NOTES
"Individual Psychotherapy (PhD/LCSW)    7/29/2024    Site:  [facility]         Therapeutic Intervention: Met with patient.  Outpatient - Insight oriented psychotherapy 45 min - CPT code 04192    Chief complaint/reason for encounter: anxiety     Interval history and content of current session: Pt doing well. Reports ex reaches out frequently in texts saying he loves her, which she ignores, asks her to have coffee, which she agrees to to encourage him to meet his financial obligations to her. Pt gives extensive background about their relationship,his girlfriend, her 85yo mother who lives alone and has erratic BP. Pt does enjoy friends, happy in her job and her living situation. Discuss issues around starting to date, being her "authentic self".     Treatment plan:  Target symptoms: anxiety   Why chosen therapy is appropriate versus another modality: relevant to diagnosis  Outcome monitoring methods: self-report  Therapeutic intervention type: insight oriented psychotherapy    Risk parameters:  Patient reports no suicidal ideation  Patient reports no homicidal ideation  Patient reports no self-injurious behavior  Patient reports no violent behavior    Verbal deficits: None    Patient's response to intervention:  The patient's response to intervention is motivated.    Progress toward goals and other mental status changes:  The patient's progress toward goals is good.    Diagnosis:   anxiety    Plan:  individual psychotherapy    Return to clinic: as scheduled    Length of Service (minutes): 45        "

## 2024-08-13 ENCOUNTER — PATIENT MESSAGE (OUTPATIENT)
Dept: PSYCHIATRY | Facility: CLINIC | Age: 59
End: 2024-08-13
Payer: COMMERCIAL

## 2024-08-26 ENCOUNTER — OFFICE VISIT (OUTPATIENT)
Dept: PSYCHIATRY | Facility: CLINIC | Age: 59
End: 2024-08-26
Payer: COMMERCIAL

## 2024-08-26 DIAGNOSIS — F41.9 ANXIETY: Primary | ICD-10-CM

## 2024-08-26 PROCEDURE — 90834 PSYTX W PT 45 MINUTES: CPT | Mod: S$GLB,,, | Performed by: SOCIAL WORKER

## 2024-08-26 PROCEDURE — 4010F ACE/ARB THERAPY RXD/TAKEN: CPT | Mod: CPTII,S$GLB,, | Performed by: SOCIAL WORKER

## 2024-08-26 PROCEDURE — 99999 PR PBB SHADOW E&M-EST. PATIENT-LVL I: CPT | Mod: PBBFAC,,, | Performed by: SOCIAL WORKER

## 2024-08-26 NOTE — PROGRESS NOTES
Individual Psychotherapy (PhD/LCSW)    8/26/2024    Site:  [facility]         Therapeutic Intervention: Met with patient.  Outpatient - Insight oriented psychotherapy 45 min - CPT code 78259    Chief complaint/reason for encounter: anxiety     Interval history and content of current session: Pt doing well.Holding boundaries with 2 men she has seen briefly, working with  to get ex to meet his financial obligations, attentive to her own needs and feelings in all of these situations.    Treatment plan:  Target symptoms: anxiety   Why chosen therapy is appropriate versus another modality: relevant to diagnosis  Outcome monitoring methods: self-report  Therapeutic intervention type: insight oriented psychotherapy    Risk parameters:  Patient reports no suicidal ideation  Patient reports no homicidal ideation  Patient reports no self-injurious behavior  Patient reports no violent behavior    Verbal deficits: None    Patient's response to intervention:  The patient's response to intervention is motivated.    Progress toward goals and other mental status changes:  The patient's progress toward goals is good.    Diagnosis:   anxiety    Plan:  individual psychotherapy    Return to clinic: as scheduled    Length of Service (minutes): 45

## 2024-08-28 ENCOUNTER — OFFICE VISIT (OUTPATIENT)
Dept: DERMATOLOGY | Facility: CLINIC | Age: 59
End: 2024-08-28
Payer: COMMERCIAL

## 2024-08-28 DIAGNOSIS — L81.4 LENTIGINES: ICD-10-CM

## 2024-08-28 DIAGNOSIS — D22.9 MULTIPLE BENIGN NEVI: ICD-10-CM

## 2024-08-28 DIAGNOSIS — Z85.828 HISTORY OF NONMELANOMA SKIN CANCER: ICD-10-CM

## 2024-08-28 DIAGNOSIS — L82.1 SK (SEBORRHEIC KERATOSIS): Primary | ICD-10-CM

## 2024-08-28 DIAGNOSIS — L72.0 MILIA: ICD-10-CM

## 2024-08-28 DIAGNOSIS — Z12.83 SKIN CANCER SCREENING: ICD-10-CM

## 2024-08-28 DIAGNOSIS — L73.8 SEBACEOUS HYPERPLASIA: ICD-10-CM

## 2024-08-28 DIAGNOSIS — D18.01 CHERRY ANGIOMA: ICD-10-CM

## 2024-08-28 PROCEDURE — 4010F ACE/ARB THERAPY RXD/TAKEN: CPT | Mod: CPTII,S$GLB,, | Performed by: DERMATOLOGY

## 2024-08-28 PROCEDURE — 99999 PR PBB SHADOW E&M-EST. PATIENT-LVL III: CPT | Mod: PBBFAC,,, | Performed by: DERMATOLOGY

## 2024-08-28 PROCEDURE — 1159F MED LIST DOCD IN RCRD: CPT | Mod: CPTII,S$GLB,, | Performed by: DERMATOLOGY

## 2024-08-28 PROCEDURE — 1160F RVW MEDS BY RX/DR IN RCRD: CPT | Mod: CPTII,S$GLB,, | Performed by: DERMATOLOGY

## 2024-08-28 PROCEDURE — 99213 OFFICE O/P EST LOW 20 MIN: CPT | Mod: S$GLB,,, | Performed by: DERMATOLOGY

## 2024-08-28 NOTE — PATIENT INSTRUCTIONS

## 2024-08-28 NOTE — PROGRESS NOTES
Subjective:      Patient ID:  Rhoda Daly is a 59 y.o. female who presents for   Chief Complaint   Patient presents with    Skin Check     Tbse      History of Present Illness: The patient presents for follow up of skin check.    The patient was last seen on: 2/26/2024 for concern of lesion on forehead.   This is a high risk patient here to check for the development of new lesions.     Has hx of BCC R lower forehead excised by SSW in 12/2023.    Other skin complaints:   Patient with new area of concern:   Location: left upper arm   Previous treatments: none       Mole there has been present x yrs, but seems to be more raised now.    Review of Systems   Constitutional: Negative.  Negative for fever and chills.   HENT: Negative.     Respiratory: Negative.  Negative for cough and shortness of breath.    Gastrointestinal:  Negative for nausea and vomiting.   Musculoskeletal: Negative.  Negative for joint swelling and arthralgias.   Skin:  Positive for daily sunscreen use, recent sunburn (shoulders) and wears hat (occ). Negative for activity-related sunscreen use.   All other systems reviewed and are negative.  Hematologic/Lymphatic: Does not bruise/bleed easily.       Objective:   Physical Exam   Constitutional: She appears well-developed and well-nourished. No distress.   Neurological: She is alert and oriented to person, place, and time. She is not disoriented.   Psychiatric: She has a normal mood and affect.   Skin:   Areas Examined (abnormalities noted in diagram):   Scalp / Hair Palpated and Inspected  Head / Face Inspection Performed  Neck Inspection Performed  Chest / Axilla Inspection Performed  Abdomen Inspection Performed  Genitals / Buttocks / Groin Inspection Performed  Back Inspection Performed  RUE Inspected  LUE Inspection Performed  RLE Inspected  LLE Inspection Performed  Nails and Digits Inspection Performed                     Diagram Legend     Erythematous scaling macule/papule c/w actinic  keratosis       Vascular papule c/w angioma      Pigmented verrucoid papule/plaque c/w seborrheic keratosis      Yellow umbilicated papule c/w sebaceous hyperplasia      Irregularly shaped tan macule c/w lentigo     1-2 mm smooth white papules consistent with Milia      Movable subcutaneous cyst with punctum c/w epidermal inclusion cyst      Subcutaneous movable cyst c/w pilar cyst      Firm pink to brown papule c/w dermatofibroma      Pedunculated fleshy papule(s) c/w skin tag(s)      Evenly pigmented macule c/w junctional nevus     Mildly variegated pigmented, slightly irregular-bordered macule c/w mildly atypical nevus      Flesh colored to evenly pigmented papule c/w intradermal nevus       Pink pearly papule/plaque c/w basal cell carcinoma      Erythematous hyperkeratotic cursted plaque c/w SCC      Surgical scar with no sign of skin cancer recurrence      Open and closed comedones      Inflammatory papules and pustules      Verrucoid papule consistent consistent with wart     Erythematous eczematous patches and plaques     Dystrophic onycholytic nail with subungual debris c/w onychomycosis     Umbilicated papule    Erythematous-base heme-crusted tan verrucoid plaque consistent with inflamed seborrheic keratosis     Erythematous Silvery Scaling Plaque c/w Psoriasis     See annotation      Assessment / Plan:        SK (seborrheic keratosis)  These are benign inherited growths without a malignant potential. Reassurance given to patient. No treatment is necessary.   Treatment of benign, asymptomatic lesions may be considered cosmetic.  Warned about risk of hypo- or hyperpigmentation with treatment and risk of recurrence.    Cherry angioma  This is a benign vascular lesion. Reassurance given. No treatment required. Treatment of benign, asymptomatic lesions may be considered cosmetic.    Milia  This is a benign cyst. Reassurance provided. No treatment is necessary unless it is symptomatic. These may resolve on their  own.    Multiple benign nevi  Benign-appearing nevi present on exam today. Reassurance provided. Periodically examine moles and return to clinic if any moles change or become symptomatic (bleeding, itching, pain, etc).    Lentigines  These are benign sun spots which should be monitored for changes. Patient instructed in importance of daily broad spectrum sunscreen use with spf at least 30. Sun avoidance and topical protection/protective clothing discussed.    Sebaceous hyperplasia  This is a benign overgrowth of oil glands. Reassurance given. No treatment required.  Treatment of benign, asymptomatic lesions may be considered cosmetic.    History of nonmelanoma skin cancer  Skin cancer screening  Total body skin examination performed today including at least 12 points as noted in physical examination. No lesions suspicious for malignancy noted.  Patient instructed in importance of daily broad spectrum sunscreen use with spf at least 30. Sun avoidance and topical protection/protective clothing discussed.    Follow up in about 1 year (around 8/28/2025) for skin check or sooner for any concerns.

## 2024-09-23 ENCOUNTER — OFFICE VISIT (OUTPATIENT)
Dept: PSYCHIATRY | Facility: CLINIC | Age: 59
End: 2024-09-23
Payer: COMMERCIAL

## 2024-09-23 DIAGNOSIS — F41.9 ANXIETY: Primary | ICD-10-CM

## 2024-09-23 PROCEDURE — 90834 PSYTX W PT 45 MINUTES: CPT | Mod: S$GLB,,, | Performed by: SOCIAL WORKER

## 2024-09-23 PROCEDURE — 99999 PR PBB SHADOW E&M-EST. PATIENT-LVL I: CPT | Mod: PBBFAC,,, | Performed by: SOCIAL WORKER

## 2024-09-23 PROCEDURE — 4010F ACE/ARB THERAPY RXD/TAKEN: CPT | Mod: CPTII,S$GLB,, | Performed by: SOCIAL WORKER

## 2024-09-23 NOTE — PROGRESS NOTES
"Individual Psychotherapy (PhD/LCSW)    9/23/2024    Site:  [facility]         Therapeutic Intervention: Met with patient.  Outpatient - Insight oriented psychotherapy 45 min - CPT code 74743    Chief complaint/reason for encounter: anxiety     Interval history and content of current session: Discuss stress at work, pressure she puts on herself to cover all bases, ways she could  "not sweat the small stuff."  Discuss frustration with her ex who makes financial promises and does not keep them. Pt on group text with him and 2 adult kids, ex uses it in ways that annoy pt, explore her reasons for wanting to continue the text.    Treatment plan:  Target symptoms: anxiety   Why chosen therapy is appropriate versus another modality: relevant to diagnosis  Outcome monitoring methods: self-report  Therapeutic intervention type: insight oriented psychotherapy    Risk parameters:  Patient reports no suicidal ideation  Patient reports no homicidal ideation  Patient reports no self-injurious behavior  Patient reports no violent behavior    Verbal deficits: None    Patient's response to intervention:  The patient's response to intervention is motivated.    Progress toward goals and other mental status changes:  The patient's progress toward goals is good.    Diagnosis:   anxiety    Plan:  individual psychotherapy    Return to clinic: as scheduled    Length of Service (minutes): 45            "

## 2024-10-14 ENCOUNTER — PATIENT MESSAGE (OUTPATIENT)
Dept: PSYCHIATRY | Facility: CLINIC | Age: 59
End: 2024-10-14
Payer: COMMERCIAL

## 2024-10-21 ENCOUNTER — PATIENT MESSAGE (OUTPATIENT)
Dept: PSYCHIATRY | Facility: CLINIC | Age: 59
End: 2024-10-21
Payer: COMMERCIAL

## 2024-11-25 ENCOUNTER — PATIENT MESSAGE (OUTPATIENT)
Dept: PSYCHIATRY | Facility: CLINIC | Age: 59
End: 2024-11-25
Payer: COMMERCIAL

## 2025-01-17 ENCOUNTER — PATIENT MESSAGE (OUTPATIENT)
Dept: OPTOMETRY | Facility: CLINIC | Age: 60
End: 2025-01-17
Payer: COMMERCIAL

## 2025-01-24 ENCOUNTER — OFFICE VISIT (OUTPATIENT)
Dept: OPTOMETRY | Facility: CLINIC | Age: 60
End: 2025-01-24
Payer: COMMERCIAL

## 2025-01-24 DIAGNOSIS — H52.03 HYPEROPIA WITH PRESBYOPIA OF BOTH EYES: ICD-10-CM

## 2025-01-24 DIAGNOSIS — H25.13 NUCLEAR SCLEROSIS, BILATERAL: Primary | ICD-10-CM

## 2025-01-24 DIAGNOSIS — H52.4 HYPEROPIA WITH PRESBYOPIA OF BOTH EYES: ICD-10-CM

## 2025-01-24 PROCEDURE — 92310 CONTACT LENS FITTING OU: CPT | Mod: CSM,,, | Performed by: OPTOMETRIST

## 2025-01-24 PROCEDURE — 92015 DETERMINE REFRACTIVE STATE: CPT | Mod: ,,, | Performed by: OPTOMETRIST

## 2025-01-24 PROCEDURE — 99999 PR PBB SHADOW E&M-EST. PATIENT-LVL III: CPT | Mod: PBBFAC,,, | Performed by: OPTOMETRIST

## 2025-01-24 PROCEDURE — 92014 COMPRE OPH EXAM EST PT 1/>: CPT | Mod: ,,, | Performed by: OPTOMETRIST

## 2025-01-24 NOTE — PROGRESS NOTES
HPI    Annual Exam   Pt states vision is pretty good   Could be better @ distance but likes her near vision as is     Pt denies F/F     Pt denies Dry/ Itchy/ Burning  Gtt: No    Annual CL EXAM   Brand: AV MAX Multi  Replacement: 1 day  Sleeps in lenses: No  Comfort problems: No  Solution: NA      Last edited by Peace Guido on 1/24/2025  9:04 AM.            Assessment /Plan     For exam results, see Encounter Report.    Nuclear sclerosis, bilateral  -Educated patient on presence of cataracts at today's exam, monitor at annual dilated fundus exam. 8+ years surgical estimate.    Hyperopia with presbyopia of both eyes  Eyeglass Final Rx       Eyeglass Final Rx         Sphere Cylinder Dist VA Add    Right +1.50 Sphere 20/20 +2.25    Left +1.50 Sphere 20/20 +2.25      Type: PAL    Expiration Date: 1/24/2026                  Trial #2 ordered, Ok to dispense, 1 wk PHREV  -Guilherme LEDEZMA     RTC 1 wk PHREV, 12 mo annual

## 2025-01-28 ENCOUNTER — PATIENT MESSAGE (OUTPATIENT)
Dept: OPTOMETRY | Facility: CLINIC | Age: 60
End: 2025-01-28
Payer: COMMERCIAL

## 2025-01-29 DIAGNOSIS — I10 ESSENTIAL HYPERTENSION: ICD-10-CM

## 2025-01-29 RX ORDER — VALSARTAN 40 MG/1
40 TABLET ORAL DAILY
Qty: 90 TABLET | Refills: 0 | Status: SHIPPED | OUTPATIENT
Start: 2025-01-29

## 2025-01-29 NOTE — TELEPHONE ENCOUNTER
Care Due:                  Date            Visit Type   Department     Provider  --------------------------------------------------------------------------------                                MYCHART                              ANNUAL                              CHECKUP/PHY  [facility] INTERNAL  Last Visit: 12-      [facility]       Manoj Stein                              MYCHART                              ANNUAL                              CHECKUP/PHY  N[facility] INTERNAL  Next Visit: 02-      [facility]       Manoj Stein                                                            Last  Test          Frequency    Reason                     Performed    Due Date  --------------------------------------------------------------------------------    CMP.........  12 months..  valsartan................  12- 12-    [facility] Embedded Care Due Messages. Reference number: [id number].   1/29/2025 10:25:38 AM CST

## 2025-01-29 NOTE — TELEPHONE ENCOUNTER
Refill Routing Note   Medication(s) are not appropriate for processing by [facility] for the following reason(s):        Required labs outdated    [facility] action(s):  Defer   Requires labs : Yes             Appointments  past 12m or future 3m with PCP    Date Provider   Last Visit   12/12/2023 Manoj Stein MD   Next Visit   2/11/2025 Manoj Stein MD   ED visits in past 90 days: 0        Note composed:11:25 AM 01/29/2025

## 2025-02-03 ENCOUNTER — HOSPITAL ENCOUNTER (OUTPATIENT)
Dept: RADIOLOGY | Facility: OTHER | Age: 60
Discharge: HOME | End: 2025-02-03
Attending: INTERNAL MEDICINE
Payer: COMMERCIAL

## 2025-02-03 DIAGNOSIS — Z12.31 ENCOUNTER FOR SCREENING MAMMOGRAM FOR BREAST CANCER: ICD-10-CM

## 2025-02-03 PROCEDURE — 77067 SCR MAMMO BI INCL CAD: CPT | Mod: TC

## 2025-02-03 PROCEDURE — 77063 BREAST TOMOSYNTHESIS BI: CPT | Mod: 26,,, | Performed by: RADIOLOGY

## 2025-02-03 PROCEDURE — 77067 SCR MAMMO BI INCL CAD: CPT | Mod: 26,,, | Performed by: RADIOLOGY

## 2025-02-06 ENCOUNTER — TELEPHONE (OUTPATIENT)
Dept: OPTOMETRY | Facility: CLINIC | Age: 60
End: 2025-02-06
Payer: COMMERCIAL

## 2025-02-06 ENCOUNTER — PATIENT MESSAGE (OUTPATIENT)
Dept: OPTOMETRY | Facility: CLINIC | Age: 60
End: 2025-02-06
Payer: COMMERCIAL

## 2025-02-06 NOTE — TELEPHONE ENCOUNTER
Final  Contact Lens Final Rx       Final Contact Lens Rx         Brand Base Curve Diameter Sphere Cylinder Add    Right AV 1 day MAX Multifocal 8.4 14.3 +1.50 Sphere med    Left AV 1 day MAX Multifocal 8.4 14.3 +1.50 Sphere med      Expiration Date: 2/6/2026    Replacement: Daily    Wearing Schedule: Daily Wear

## 2025-02-11 ENCOUNTER — OFFICE VISIT (OUTPATIENT)
Dept: INTERNAL MEDICINE | Facility: CLINIC | Age: 60
End: 2025-02-11
Payer: COMMERCIAL

## 2025-02-11 ENCOUNTER — LAB VISIT (OUTPATIENT)
Dept: LAB | Facility: HOSPITAL | Age: 60
End: 2025-02-11
Attending: INTERNAL MEDICINE
Payer: COMMERCIAL

## 2025-02-11 VITALS
DIASTOLIC BLOOD PRESSURE: 62 MMHG | WEIGHT: 115.06 LBS | OXYGEN SATURATION: 99 % | HEART RATE: 70 BPM | BODY MASS INDEX: 19.64 KG/M2 | SYSTOLIC BLOOD PRESSURE: 110 MMHG | HEIGHT: 64 IN

## 2025-02-11 DIAGNOSIS — F43.20 ADJUSTMENT DISORDER, UNSPECIFIED TYPE: ICD-10-CM

## 2025-02-11 DIAGNOSIS — Z00.00 ROUTINE GENERAL MEDICAL EXAMINATION AT A HEALTH CARE FACILITY: ICD-10-CM

## 2025-02-11 DIAGNOSIS — Z00.00 ROUTINE GENERAL MEDICAL EXAMINATION AT A HEALTH CARE FACILITY: Primary | ICD-10-CM

## 2025-02-11 DIAGNOSIS — I10 ESSENTIAL HYPERTENSION: ICD-10-CM

## 2025-02-11 LAB
ANION GAP SERPL CALC-SCNC: 10 MMOL/L (ref 8–16)
BUN SERPL-MCNC: 17 MG/DL (ref 6–20)
CALCIUM SERPL-MCNC: 9.8 MG/DL (ref 8.7–10.5)
CHLORIDE SERPL-SCNC: 105 MMOL/L (ref 95–110)
CHOLEST SERPL-MCNC: 219 MG/DL (ref 120–199)
CHOLEST/HDLC SERPL: 2.8 {RATIO} (ref 2–5)
CO2 SERPL-SCNC: 26 MMOL/L (ref 23–29)
CREAT SERPL-MCNC: 0.8 MG/DL (ref 0.5–1.4)
EST. GFR  (NO RACE VARIABLE): >60 ML/MIN/1.73 M^2
GLUCOSE SERPL-MCNC: 96 MG/DL (ref 70–110)
HDLC SERPL-MCNC: 78 MG/DL (ref 40–75)
HDLC SERPL: 35.6 % (ref 20–50)
LDLC SERPL CALC-MCNC: 125.6 MG/DL (ref 63–159)
NONHDLC SERPL-MCNC: 141 MG/DL
POTASSIUM SERPL-SCNC: 4.1 MMOL/L (ref 3.5–5.1)
SODIUM SERPL-SCNC: 141 MMOL/L (ref 136–145)
TRIGL SERPL-MCNC: 77 MG/DL (ref 30–150)

## 2025-02-11 PROCEDURE — 80048 BASIC METABOLIC PNL TOTAL CA: CPT | Performed by: INTERNAL MEDICINE

## 2025-02-11 PROCEDURE — 36415 COLL VENOUS BLD VENIPUNCTURE: CPT | Performed by: INTERNAL MEDICINE

## 2025-02-11 PROCEDURE — 80061 LIPID PANEL: CPT | Performed by: INTERNAL MEDICINE

## 2025-02-11 PROCEDURE — 99999 PR PBB SHADOW E&M-EST. PATIENT-LVL III: CPT | Mod: PBBFAC,,, | Performed by: INTERNAL MEDICINE

## 2025-02-11 RX ORDER — PANTOPRAZOLE SODIUM 20 MG/1
20 TABLET, DELAYED RELEASE ORAL DAILY
Qty: 90 TABLET | Refills: 11 | Status: SHIPPED | OUTPATIENT
Start: 2025-02-11

## 2025-02-11 RX ORDER — CITALOPRAM 10 MG/1
10 TABLET ORAL DAILY
Qty: 90 TABLET | Refills: 1 | Status: SHIPPED | OUTPATIENT
Start: 2025-02-11

## 2025-02-11 RX ORDER — VALSARTAN 40 MG/1
40 TABLET ORAL DAILY
Qty: 90 TABLET | Refills: 11 | Status: SHIPPED | OUTPATIENT
Start: 2025-02-11

## 2025-02-11 NOTE — PROGRESS NOTES
Subjective     Patient ID: Rhoda Daly is a 59 y.o. female.    Chief Complaint: Annual Exam             History of Present Illness    CHIEF COMPLAINT:  Rhoda presents today for worsening anxiety.    ANXIETY:  She reports worsening anxiety symptoms with physical manifestations including racing heart, feeling hot and cold simultaneously, and whole body shaking. The anxiety is impacting her decision-making abilities, causing difficulty and overthinking. She denies experiencing anxiety of this intensity in the past and distinguishes symptoms as anxiety rather than depression, emphasizing the physical sensations. Sleep disturbances occur at least a couple nights per week, characterized by quick sleep onset but early awakening with difficulty returning to sleep.    MENTAL HEALTH HISTORY:  She has a history of using Xanax as needed for flying anxiety and occasional nocturnal anxiety-related insomnia. She previously saw therapist Kika Boyce who moved away, and was subsequently referred to therapist Ms To whom she saw briefly.    SOCIAL HISTORY:  She is newly  for one year. She works from home, which sometimes results in not leaving the house for days. She consumes one alcoholic beverage daily, increasing to two drinks on weekends when dining out.    EXERCISE:  She walks regularly on weekends but reports decreased frequency during weekdays due to limited daylight hours.    CURRENT MEDICATIONS:  She takes daily blood pressure medication and pantoprazole as needed for heartburn, though she reports not having used it recently.    FAMILY HISTORY:  Her mother has some well-managed health issues that can cause anxiety.      ROS:  Constitutional: negative activity change, negative unexpected weight change  HENT: negative trouble swallowing  Eyes: negative discharge, negative visual disturbance  Respiratory: negative chest tightness, negative wheezing, negative shortness of breath  Cardiovascular: negative  chest pain, +palpitations  Gastrointestinal: negative blood in stool, negative constipation, negative diarrhea, negative vomiting  Endocrine: negative polydipsia, negative polyuria  Genitourinary: negative difficulty urinating, negative dysuria, negative hematuria  Musculoskeletal: negative arthralgias, negative joint swelling, negative neck pain  Neurological: negative weakness, negative headaches  Psychiatric/Behavioral: negative confusion, negative dysphoric mood  Psychiatric: +anxiety, +sleep difficulty         HPI  Review of Systems     Objective     Physical Exam  Constitutional:       General: She is not in acute distress.     Appearance: Normal appearance. She is well-developed. She is not ill-appearing, toxic-appearing or diaphoretic.      Comments: Well appearing, breathing comfortably, speaking full sentences, no coughing during encounter     Pulmonary:      Effort: Pulmonary effort is normal. No respiratory distress.   Skin:     Findings: No rash.   Neurological:      Mental Status: She is alert and oriented to person, place, and time.   Psychiatric:         Behavior: Behavior normal.         Thought Content: Thought content normal.         Judgment: Judgment normal.            Assessment and Plan     1. Routine general medical examination at a health care facility  -     BASIC METABOLIC PANEL; Future; Expected date: 02/11/2025  -     Lipid Panel; Future; Expected date: 02/11/2025    2. Essential hypertension  -     valsartan (DIOVAN) 40 MG tablet; Take 1 tablet (40 mg total) by mouth once daily.  Dispense: 90 tablet; Refill: 11    3. Adjustment disorder, unspecified type  -     citalopram (CELEXA) 10 MG tablet; Take 1 tablet (10 mg total) by mouth once daily.  Dispense: 90 tablet; Refill: 1    Other orders  -     pantoprazole (PROTONIX) 20 MG tablet; Take 1 tablet (20 mg total) by mouth once daily.  Dispense: 90 tablet; Refill: 11        Assessment & Plan    ANXIETY:  - Assessed the patient's anxiety  symptoms, which have worsened over the past 6-8 weeks, causing physical symptoms including heart racing, feeling hot and cold, whole body shaking, and impacting decision-making, sleep, and social interactions.  - Evaluated current coping mechanisms and support system.  - Discussed potential benefits of daily SSRI (Celexa/Citalopram) for managing anxiety symptoms.  - Explained mechanism of action for SSRIs, including expected timeline for efficacy (typically a few weeks).  - Discussed potential side effects of Celexa, including initial GI disturbances and possible emotional flattening.  - Prescribed Celexa (Citalopram) 1 tablet daily with food in the morning.  - Provided 90 tablets with 1 refill.  - Suggested psychotherapy as complementary treatment option.  - Educated about various relaxation resources, including YouTube videos and the Calm shadi.  - Instructed the patient to implement daily relaxation breathing exercises, even when not actively experiencing anxiety.  - Recommend using Calm shadi or free YouTube relaxation videos for guided exercises.  - Referred to Department of Psychology/Psychiatry for psychotherapist referrals.  - Scheduled follow-up video visit in 2-3 mos to assess response to Celexa.  - Advised to contact office if experiencing adverse effects from Celexa or if not feeling positive effects after 3 weeks.  - Instructed to contact office with any questions about new medication regimen.  - Noted that the patient reports sleep disturbances related to anxiety, waking up and unable to return to sleep a few nights a week.  - Inquired about the use of sleep aids.  - Considered patient's recent life stressors, including recent divorce finalized for a year.  - Acknowledged ongoing financial interactions with the ex-spouse as a potential stressor contributing to anxiety.  - Rhoda to increase engagement with existing support system.  - Recommend continuing regular walking exercise, especially as daylight  hours increase.    HYPERTENSION:  - Continued Valsartan for blood pressure management.  - Sent prescription for 1 year supply.  - Planned to check kidney function, which is important for patients on antihypertensive medication.    GASTROESOPHAGEAL REFLUX DISEASE (GERD):  - Continued Pantoprazole (Protonix) for as-needed use for heartburn.  - Noted that the patient reports not taking pantoprazole for a while.    HYPERLIPIDEMIA:  - Ordered fasting lipid panel.  - Noted patient's history of hyperlipidemia, but also good HDL cholesterol levels.    LABS:  - Kidney function test ordered.                    Health Maintenance         Date Due Completion Date    HIV Screening Never done ---    Pneumococcal Vaccines (Age 50+) (1 of 1 - PCV) Never done ---    RSV Vaccine (Age 60+ and Pregnant patients) (1 - Risk 60-74 years 1-dose series) Never done ---    Mammogram 02/03/2026 2/3/2025    TETANUS VACCINE 02/08/2026 2/8/2016    Cervical Cancer Screening 05/30/2028 5/30/2023    Lipid Panel 02/11/2030 2/11/2025    Colorectal Cancer Screening 03/04/2032 3/4/2022          Follow up in about 3 months (around 5/11/2025) for Virtual Visit.    Visit today included increased complexity associated with the care of the episodic problem  addressed and managing the longitudinal care of the patient due to the serious and/or complex managed problem(s) .    Future Appointments   Date Time Provider Department Center   5/16/2025 10:20 AM Manoj Stein MD [facility]         This note was generated with the assistance of ambient listening technology. Verbal consent was obtained by the patient and accompanying visitor(s) for the recording of patient appointment to facilitate this note. I attest to having reviewed and edited the generated note for accuracy, though some syntax or spelling errors may persist. Please contact the author of this note for any clarification.

## 2025-03-16 ENCOUNTER — PATIENT MESSAGE (OUTPATIENT)
Dept: OPTOMETRY | Facility: CLINIC | Age: 60
End: 2025-03-16
Payer: COMMERCIAL

## 2025-05-15 ENCOUNTER — PATIENT MESSAGE (OUTPATIENT)
Dept: INTERNAL MEDICINE | Facility: CLINIC | Age: 60
End: 2025-05-15
Payer: COMMERCIAL

## 2025-06-10 ENCOUNTER — PATIENT MESSAGE (OUTPATIENT)
Dept: DERMATOLOGY | Facility: CLINIC | Age: 60
End: 2025-06-10
Payer: COMMERCIAL

## 2025-07-16 ENCOUNTER — HOSPITAL ENCOUNTER (OUTPATIENT)
Dept: RADIOLOGY | Facility: OTHER | Age: 60
Discharge: HOME | End: 2025-07-16
Attending: STUDENT IN AN ORGANIZED HEALTH CARE EDUCATION/TRAINING PROGRAM
Payer: COMMERCIAL

## 2025-07-16 ENCOUNTER — OFFICE VISIT (OUTPATIENT)
Dept: OBSTETRICS AND GYNECOLOGY | Facility: CLINIC | Age: 60
End: 2025-07-16
Payer: COMMERCIAL

## 2025-07-16 ENCOUNTER — RESULTS FOLLOW-UP (OUTPATIENT)
Dept: OBSTETRICS AND GYNECOLOGY | Facility: CLINIC | Age: 60
End: 2025-07-16

## 2025-07-16 VITALS
HEIGHT: 64 IN | BODY MASS INDEX: 19.23 KG/M2 | WEIGHT: 112.63 LBS | SYSTOLIC BLOOD PRESSURE: 102 MMHG | DIASTOLIC BLOOD PRESSURE: 70 MMHG

## 2025-07-16 DIAGNOSIS — Z01.419 WELL WOMAN EXAM WITH ROUTINE GYNECOLOGICAL EXAM: Primary | ICD-10-CM

## 2025-07-16 DIAGNOSIS — R10.31 RIGHT LOWER QUADRANT PAIN: ICD-10-CM

## 2025-07-16 DIAGNOSIS — R10.9 ABDOMINAL PAIN, UNSPECIFIED ABDOMINAL LOCATION: ICD-10-CM

## 2025-07-16 DIAGNOSIS — M85.80 OSTEOPENIA, UNSPECIFIED LOCATION: ICD-10-CM

## 2025-07-16 PROCEDURE — 74177 CT ABD & PELVIS W/CONTRAST: CPT | Mod: 26,,, | Performed by: RADIOLOGY

## 2025-07-16 PROCEDURE — 25500020 PHARM REV CODE 255: Performed by: STUDENT IN AN ORGANIZED HEALTH CARE EDUCATION/TRAINING PROGRAM

## 2025-07-16 PROCEDURE — 1160F RVW MEDS BY RX/DR IN RCRD: CPT | Mod: CPTII,S$GLB,, | Performed by: STUDENT IN AN ORGANIZED HEALTH CARE EDUCATION/TRAINING PROGRAM

## 2025-07-16 PROCEDURE — 74177 CT ABD & PELVIS W/CONTRAST: CPT | Mod: TC

## 2025-07-16 PROCEDURE — 3008F BODY MASS INDEX DOCD: CPT | Mod: CPTII,S$GLB,, | Performed by: STUDENT IN AN ORGANIZED HEALTH CARE EDUCATION/TRAINING PROGRAM

## 2025-07-16 PROCEDURE — 99999 PR PBB SHADOW E&M-EST. PATIENT-LVL III: CPT | Mod: PBBFAC,,, | Performed by: STUDENT IN AN ORGANIZED HEALTH CARE EDUCATION/TRAINING PROGRAM

## 2025-07-16 PROCEDURE — 99396 PREV VISIT EST AGE 40-64: CPT | Mod: S$GLB,,, | Performed by: STUDENT IN AN ORGANIZED HEALTH CARE EDUCATION/TRAINING PROGRAM

## 2025-07-16 PROCEDURE — 3074F SYST BP LT 130 MM HG: CPT | Mod: CPTII,S$GLB,, | Performed by: STUDENT IN AN ORGANIZED HEALTH CARE EDUCATION/TRAINING PROGRAM

## 2025-07-16 PROCEDURE — 1159F MED LIST DOCD IN RCRD: CPT | Mod: CPTII,S$GLB,, | Performed by: STUDENT IN AN ORGANIZED HEALTH CARE EDUCATION/TRAINING PROGRAM

## 2025-07-16 PROCEDURE — 4010F ACE/ARB THERAPY RXD/TAKEN: CPT | Mod: CPTII,S$GLB,, | Performed by: STUDENT IN AN ORGANIZED HEALTH CARE EDUCATION/TRAINING PROGRAM

## 2025-07-16 PROCEDURE — 3078F DIAST BP <80 MM HG: CPT | Mod: CPTII,S$GLB,, | Performed by: STUDENT IN AN ORGANIZED HEALTH CARE EDUCATION/TRAINING PROGRAM

## 2025-07-16 RX ADMIN — IOHEXOL 75 ML: 350 INJECTION, SOLUTION INTRAVENOUS at 03:07

## 2025-07-16 NOTE — PROGRESS NOTES
History & Physical  Gynecology      SUBJECTIVE:     Chief Complaint: Well Woman (Pt states having LRQ pain for the last couple of days that the pain level gets to an 8; pt states she is wondering if it is her cecum /)       History of Present Illness:  Annual exam. Had recent bout of issue with cecum that required anitibiotics. Has had one more flare since. Feels that she is having that again with RLQ pain. No issues with bowel or bladder  Menstrual History: menopausal since age early 50s. No HRT. No PMB.   Obstetric Hx: 2; 1SVD 1 CS  Sexually Active: no  Family history: Denies any personal or family history of GYN/colon cancers   Social: Wears seatbelts. Exercises. Feels safe at home.   Last pap:  normal, HPV neg  Last mammo: 2025  DEXA: 2023 osteopenia  Colonoscopy: , due in ten  Vitamins: no      Review of patient's allergies indicates:  No Known Allergies    Past Medical History:   Diagnosis Date    Basal cell carcinoma, forehead 2023    right lower forehead    Colon polyp 2015    Diverticulitis     Hypertension      Past Surgical History:   Procedure Laterality Date     SECTION      COLONOSCOPY N/A 3/4/2022    Procedure: COLONOSCOPY;  Surgeon: Elvin Parker MD;  Location: [facility] ([address] [place] [address]);  Service: Endoscopy;  Laterality: N/A;  Pt. is Fully Vaccinated.EC   rescheduled; covid test  @ [place]; instructions emailed-st  2nd floor due to availability   pt rescheduled multiple times; covid + 22 < 60 days; instructions emailed-st  3/2-pt confirmed appt for 3/4/22-BB     OB History          4    Para   4    Term   2            AB        Living   2         SAB        IAB        Ectopic        Multiple        Live Births   2               Family History   Problem Relation Name Age of Onset    Hypertension Mother nephrectomy     Peripheral vascular disease Mother nephrectomy         renal artery stenosis    Skin cancer Mother nephrectomy      Hyperlipidemia Father      Heart disease Father  80        cad, in 70s    Melanoma Father      Skin cancer Sister      Breast cancer Neg Hx      Colon cancer Neg Hx      Ovarian cancer Neg Hx      Cancer Neg Hx       Social History     Tobacco Use    Smoking status: Never    Smokeless tobacco: Never   Substance Use Topics    Alcohol use: Yes     Comment: daily, beer tonight    Drug use: No       Current Outpatient Medications   Medication Sig    citalopram (CELEXA) 10 MG tablet Take 1 tablet (10 mg total) by mouth once daily.    pantoprazole (PROTONIX) 20 MG tablet Take 1 tablet (20 mg total) by mouth once daily.    valsartan (DIOVAN) 40 MG tablet Take 1 tablet (40 mg total) by mouth once daily.     No current facility-administered medications for this visit.         Review of Systems:  Review of Systems   Constitutional:  Negative for activity change, appetite change, fever and unexpected weight change.   Respiratory:  Negative for cough and shortness of breath.    Cardiovascular:  Negative for chest pain.   Gastrointestinal:  Positive for abdominal pain. Negative for blood in stool, constipation, diarrhea, nausea and vomiting.   Genitourinary:  Negative for dysuria, hematuria, pelvic pain, vaginal bleeding, vaginal discharge, vaginal pain, urinary incontinence, postmenopausal bleeding, vaginal dryness and vaginal odor.   Integumentary:  Negative for breast mass.   Neurological:  Negative for headaches.   Psychiatric/Behavioral:  Negative for sleep disturbance.    Breast: Negative for lump and mass       OBJECTIVE:     Physical Exam:  Physical Exam  Vitals reviewed.   Constitutional:       General: She is not in acute distress.     Appearance: She is well-developed. She is not diaphoretic.   HENT:      Head: Normocephalic and atraumatic.   Eyes:      General: No scleral icterus.        Right eye: No discharge.         Left eye: No discharge.      Conjunctiva/sclera: Conjunctivae normal.   Neck:      Thyroid: No  thyromegaly.   Cardiovascular:      Rate and Rhythm: Normal rate.   Pulmonary:      Effort: Pulmonary effort is normal.   Chest:   Breasts:     Breasts are symmetrical.      Right: No inverted nipple, mass, nipple discharge, skin change or tenderness.      Left: No inverted nipple, mass, nipple discharge, skin change or tenderness.   Abdominal:      General: There is no distension.      Palpations: Abdomen is soft.      Tenderness: There is no abdominal tenderness.   Genitourinary:     Labia:         Right: No rash, tenderness, lesion or injury.         Left: No rash, tenderness, lesion or injury.       Vagina: Normal. No signs of injury and foreign body. No vaginal discharge, erythema, tenderness or bleeding.      Cervix: No cervical motion tenderness, discharge or friability.      Uterus: Not deviated, not enlarged, not fixed and not tender.       Adnexa:         Right: No mass, tenderness or fullness.          Left: No mass, tenderness or fullness.     Musculoskeletal:         General: Normal range of motion.      Cervical back: Normal range of motion and neck supple.   Lymphadenopathy:      Cervical: No cervical adenopathy.   Skin:     General: Skin is warm and dry.      Findings: No erythema or rash.   Neurological:      Mental Status: She is alert and oriented to person, place, and time.           ASSESSMENT:       ICD-10-CM ICD-9-CM    1. Well woman exam with routine gynecological exam  Z01.419 V72.31                Plan:      WWE  - Postmenopausal.   - Vaccines utd  - Labs utd  - Mammogram, Colonoscopy utd  - Pap utd   - DEXA ordered  - CT ordered, GI referral placed   - Vitamin D and Calcium recs given  - CBE normal. Physical exam normal. VSS      Counseling time: 30 minutes    Cherelle Lopez

## 2025-07-22 ENCOUNTER — LAB VISIT (OUTPATIENT)
Dept: LAB | Facility: HOSPITAL | Age: 60
End: 2025-07-22
Payer: COMMERCIAL

## 2025-07-22 ENCOUNTER — OFFICE VISIT (OUTPATIENT)
Dept: GASTROENTEROLOGY | Facility: CLINIC | Age: 60
End: 2025-07-22
Payer: COMMERCIAL

## 2025-07-22 VITALS
BODY MASS INDEX: 19.35 KG/M2 | SYSTOLIC BLOOD PRESSURE: 117 MMHG | HEIGHT: 64 IN | WEIGHT: 113.31 LBS | HEART RATE: 70 BPM | DIASTOLIC BLOOD PRESSURE: 81 MMHG

## 2025-07-22 DIAGNOSIS — R10.31 RLQ ABDOMINAL PAIN: ICD-10-CM

## 2025-07-22 DIAGNOSIS — K57.92 DIVERTICULITIS: Primary | ICD-10-CM

## 2025-07-22 DIAGNOSIS — K57.92 DIVERTICULITIS: ICD-10-CM

## 2025-07-22 LAB — CRP SERPL-MCNC: 2.6 MG/L

## 2025-07-22 PROCEDURE — 99999 PR PBB SHADOW E&M-EST. PATIENT-LVL III: CPT | Mod: PBBFAC,,,

## 2025-07-22 PROCEDURE — 36415 COLL VENOUS BLD VENIPUNCTURE: CPT

## 2025-07-22 PROCEDURE — 3074F SYST BP LT 130 MM HG: CPT | Mod: CPTII,S$GLB,,

## 2025-07-22 PROCEDURE — 86140 C-REACTIVE PROTEIN: CPT

## 2025-07-22 PROCEDURE — 4010F ACE/ARB THERAPY RXD/TAKEN: CPT | Mod: CPTII,S$GLB,,

## 2025-07-22 PROCEDURE — 99204 OFFICE O/P NEW MOD 45 MIN: CPT | Mod: S$GLB,,,

## 2025-07-22 PROCEDURE — 1159F MED LIST DOCD IN RCRD: CPT | Mod: CPTII,S$GLB,,

## 2025-07-22 PROCEDURE — 3008F BODY MASS INDEX DOCD: CPT | Mod: CPTII,S$GLB,,

## 2025-07-22 PROCEDURE — 3079F DIAST BP 80-89 MM HG: CPT | Mod: CPTII,S$GLB,,

## 2025-07-22 RX ORDER — DICYCLOMINE HYDROCHLORIDE 10 MG/1
10 CAPSULE ORAL 3 TIMES DAILY PRN
Qty: 120 CAPSULE | Refills: 0 | Status: SHIPPED | OUTPATIENT
Start: 2025-07-22

## 2025-07-22 NOTE — PROGRESS NOTES
Gastroenterology Clinic Consultation Note    Patient ID: Rhoda Daly is a 60 y.o. female.    Chief Complaint: Diverticulitis    History of Present Illness    CHIEF COMPLAINT:  Patient presents today for follow up of diverticulitis    HISTORY OF PRESENT ILLNESS:  She reports a recent diverticulitis episode with CT performed last week on 7/16. She is currently experiencing improvement compared to last Tuesday. She describes current symptoms as a burning sensation on the right side, replacing previous pain and tenderness. She denies fever and blood in stool but reports experiencing headaches.    MEDICAL HISTORY:  She has a history of recurrent diverticulitis with two prior episodes over the years. Her first episode occurred around March 2016, which required intravenous antibiotics. Her most recent episode was in 10/2020. She underwent colonoscopy on March 4th, 2022, which was normal.      ROS:  General: -fever, -chills, -fatigue, -weight gain, -weight loss  Eyes: -vision changes, -redness, -discharge  ENT: -ear pain, -nasal congestion, -sore throat  Cardiovascular: -chest pain, -palpitations, -lower extremity edema  Respiratory: -cough, -shortness of breath  Gastrointestinal: +abdominal pain, -nausea, -vomiting, -diarrhea, -constipation, -blood in stool  Genitourinary: -dysuria, -hematuria, -frequency  Musculoskeletal: -joint pain, -muscle pain  Skin: -rash, -lesion  Neurological: +headache, -dizziness, -numbness, -tingling  Psychiatric: -anxiety, -depression, -sleep difficulty         Physical Exam    General: No acute distress. Well-developed. Well-nourished.  Eyes: EOMI. Sclerae anicteric.  HENT: Normocephalic. Atraumatic. Nares patent. Moist oral mucosa.  Ears: Bilateral TMs clear. Bilateral EACs clear.  Cardiovascular: Regular rate. Regular rhythm. No murmurs. No rubs. No gallops. Normal S1, S2.  Respiratory: Normal respiratory effort. Clear to auscultation bilaterally. No rales. No rhonchi. No  "wheezing.  Abdomen: Soft. Non-tender. Non-distended. Normoactive bowel sounds.  Musculoskeletal: No  obvious deformity.  Extremities: No lower extremity edema.  Neurological: Alert & oriented x3. No slurred speech. Normal gait.  Psychiatric: Normal mood. Normal affect. Good insight. Good judgment.  Skin: Warm. Dry. No rash.         Medical History:  has a past medical history of Basal cell carcinoma, forehead (2023), Colon polyp (2015), Diverticulitis, and Hypertension.    Surgical History:  has a past surgical history that includes  section and Colonoscopy (N/A, 3/4/2022).    Family History: family history includes Heart disease (age of onset: 80) in her father; Hyperlipidemia in her father; Hypertension in her mother; Melanoma in her father; Peripheral vascular disease in her mother; Skin cancer in her mother and sister..       Review of patient's allergies indicates:  No Known Allergies    Medications Ordered Prior to Encounter[1]    Labs:  Lab Results   Component Value Date    WBC 4.51 2022    HGB 13.4 2022    HCT 40.6 2022     2022    CHOL 219 (H) 2025    TRIG 77 2025    HDL 78 (H) 2025    ALKPHOS 96 2023    LIPASE 18 2012    ALT 20 2023    AST 26 2023     2025    K 4.1 2025     2025    CREATININE 0.8 2025    BUN 17 2025    CO2 26 2025    INR 1.0 2012    HGBA1C 5.2 10/04/2021       Vital Signs:  /81 (BP Location: Right arm, Patient Position: Sitting)   Pulse 70   Ht 5' 4" (1.626 m)   Wt 51.4 kg (113 lb 5.1 oz)   LMP 2018 (Exact Date)   BMI 19.45 kg/m²   Body mass index is 19.45 kg/m².    Imaging reviewed: CT A/P 2025  Impression:    Inflammation and thickening of the ascending colon just above the cecum is recurrent when compared to 10/01/2020 but appears worse. There are few diverticula in this area and this could be cecal diverticulitis. " Follow-up to complete resolution is recommended to rule out underlying neoplasm.      Electronically signed by: Bethel Ro MD  Date: 07/16/2025  Time: 15:39     Endoscopy reviewed: Colonoscopy 3/4/2022  Impression:            - Non-thrombosed external hemorrhoids found on                          perianal exam.                          - Diverticulosis in the ascending colon.                          - No specimens collected.   Recommendation:        - Discharge patient to home.                          - High fiber diet indefinitely.                          - Continue present medications.                          - Use fiber, for example Citrucel, Fibercon,                          Konsyl or Metamucil.                          - Repeat colonoscopy in 10 years for screening                          purposes.                          - Return to referring physician as previously                          scheduled.                          - Patient has a contact number available for                          emergencies. The signs and symptoms of potential                          delayed complications were discussed with the                          patient. Return to normal activities tomorrow.                          Written discharge instructions were provided to                          the patient.   Attending Participation:        I personally performed the entire procedure.   Elvin Parker MD   3/4/2022 8:19:35 AM     Assessment:  1. Diverticulitis    2. RLQ abdominal pain      Orders Placed This Encounter    C-reactive protein    dicyclomine (BENTYL) 10 MG capsule       Assessment & Plan    K57.32 Diverticulitis of large intestine without perforation or abscess without bleeding  R10.11 Right upper quadrant pain  R51.9 Headache, unspecified    DIVERTICULITIS OF LARGE INTESTINE:   Assessed recent diverticulitis episode, noting recurrence pattern (3 episodes over 10 years).   Reviewed CT Abdomen  results, indicating worsening inflammation compared to 2020.   Considered conservative management with supportive care and liquid diet, given history and current presentation.   Determined colonoscopy unnecessary at this time given normal study in 2022, unless symptoms persist.   Considered referral to colorectal surgery if episodes become more frequent.   Explained diverticulosis as thinning of colonic mucosa with potential for infection and inflammation.   Informed about potential complications such as abscess formation and microperforation.   Patient to start with liquid diet for a few days, slowly reintroducing solid foods as symptoms improve.   Patient to avoid anti-inflammatory medications like Motrin.   Recommend incorporating high-fiber foods and supplements (pills, gummies, crackers, cookies, or muffins) into diet once symptoms resolve.   Educated on importance of high-fiber diet for prevention once acute symptoms resolve.   Started Bentyl (antispasmodic) for pain management.   May start Augmentin if CRP is elevated.   Recommend Tylenol for pain relief as needed.   Ordered CRP blood test to check inflammatory markers, may repeat in 3 weeks if initial result is elevated.    FOLLOW-UP PLAN:   Follow up in about a month (mid-August) to assess symptom resolution.   Contact the office within a week or two to report on progress          Follow up in about 4 weeks (around 8/19/2025).        PAPO RAMIREZ  Gastroenterology Department  [facility] Office [phone]      This note was generated with the assistance of ambient listening technology. Verbal consent was obtained by the patient and accompanying visitor(s) for the recording of patient appointment to facilitate this note. I attest to having reviewed and edited the generated note for accuracy, though some syntax or spelling errors may persist. Please contact the author of this note for any clarification.                        [1]   Current Outpatient Medications on File Prior to Visit   Medication Sig Dispense Refill    citalopram (CELEXA) 10 MG tablet Take 1 tablet (10 mg total) by mouth once daily. 90 tablet 1    pantoprazole (PROTONIX) 20 MG tablet Take 1 tablet (20 mg total) by mouth once daily. 90 tablet 11    valsartan (DIOVAN) 40 MG tablet Take 1 tablet (40 mg total) by mouth once daily. 90 tablet 11     No current facility-administered medications on file prior to visit.

## 2025-07-25 ENCOUNTER — HOSPITAL ENCOUNTER (OUTPATIENT)
Dept: RADIOLOGY | Facility: OTHER | Age: 60
Discharge: HOME | End: 2025-07-25
Attending: STUDENT IN AN ORGANIZED HEALTH CARE EDUCATION/TRAINING PROGRAM
Payer: COMMERCIAL

## 2025-07-25 DIAGNOSIS — M85.80 OSTEOPENIA, UNSPECIFIED LOCATION: ICD-10-CM

## 2025-07-25 PROCEDURE — 77080 DXA BONE DENSITY AXIAL: CPT | Mod: 26,,, | Performed by: RADIOLOGY

## 2025-07-25 PROCEDURE — 77080 DXA BONE DENSITY AXIAL: CPT | Mod: TC

## 2025-08-06 DIAGNOSIS — F43.20 ADJUSTMENT DISORDER, UNSPECIFIED TYPE: ICD-10-CM

## 2025-08-06 RX ORDER — CITALOPRAM 10 MG/1
10 TABLET ORAL DAILY
Qty: 90 TABLET | Refills: 1 | Status: SHIPPED | OUTPATIENT
Start: 2025-08-06

## 2025-08-06 NOTE — TELEPHONE ENCOUNTER
No care due was identified.  [facility] Embedded Care Due Messages. Reference number: [id number].   8/06/2025 8:37:12 AM CDT

## 2025-08-06 NOTE — TELEPHONE ENCOUNTER
Refill Decision Note   Rhoda Daly  is requesting a refill authorization.  Brief Assessment and Rationale for Refill:  Approve     Medication Therapy Plan:         Comments:     Note composed:12:22 PM 08/06/2025

## 2025-08-11 ENCOUNTER — PATIENT MESSAGE (OUTPATIENT)
Dept: OBSTETRICS AND GYNECOLOGY | Facility: CLINIC | Age: 60
End: 2025-08-11
Payer: COMMERCIAL

## 2025-08-11 RX ORDER — CONJUGATED ESTROGENS 0.62 MG/G
CREAM VAGINAL
Qty: 45 G | Refills: 12 | Status: SHIPPED | OUTPATIENT
Start: 2025-08-11 | End: 2025-08-11

## 2025-08-11 RX ORDER — CONJUGATED ESTROGENS 0.62 MG/G
CREAM VAGINAL
Qty: 45 G | Refills: 12 | Status: SHIPPED | OUTPATIENT
Start: 2025-08-11

## 2025-08-14 ENCOUNTER — TELEPHONE (OUTPATIENT)
Dept: ORTHOPEDICS | Facility: CLINIC | Age: 60
End: 2025-08-14
Payer: COMMERCIAL

## 2025-08-18 ENCOUNTER — OFFICE VISIT (OUTPATIENT)
Dept: GASTROENTEROLOGY | Facility: CLINIC | Age: 60
End: 2025-08-18
Payer: COMMERCIAL

## 2025-08-18 DIAGNOSIS — K57.92 DIVERTICULITIS: Primary | ICD-10-CM

## 2025-08-18 PROCEDURE — 98006 SYNCH AUDIO-VIDEO EST MOD 30: CPT | Mod: 95,,,

## 2025-08-18 PROCEDURE — 4010F ACE/ARB THERAPY RXD/TAKEN: CPT | Mod: CPTII,95,,

## 2025-08-27 ENCOUNTER — TELEPHONE (OUTPATIENT)
Dept: ORTHOPEDICS | Facility: CLINIC | Age: 60
End: 2025-08-27
Payer: COMMERCIAL

## 2025-09-05 ENCOUNTER — OFFICE VISIT (OUTPATIENT)
Dept: ORTHOPEDICS | Facility: CLINIC | Age: 60
End: 2025-09-05
Payer: COMMERCIAL

## 2025-09-05 VITALS — WEIGHT: 112.63 LBS | BODY MASS INDEX: 19.23 KG/M2 | HEIGHT: 64 IN

## 2025-09-05 DIAGNOSIS — M81.6 LOCALIZED OSTEOPOROSIS OF LEQUESNE: Primary | ICD-10-CM

## 2025-09-05 DIAGNOSIS — M81.0 OSTEOPOROSIS, UNSPECIFIED OSTEOPOROSIS TYPE, UNSPECIFIED PATHOLOGICAL FRACTURE PRESENCE: ICD-10-CM

## 2025-09-05 PROCEDURE — 99999 PR PBB SHADOW E&M-EST. PATIENT-LVL III: CPT | Mod: PBBFAC,,, | Performed by: PHYSICIAN ASSISTANT
